# Patient Record
Sex: FEMALE | Race: WHITE | NOT HISPANIC OR LATINO | Employment: FULL TIME | URBAN - METROPOLITAN AREA
[De-identification: names, ages, dates, MRNs, and addresses within clinical notes are randomized per-mention and may not be internally consistent; named-entity substitution may affect disease eponyms.]

---

## 2017-08-10 ENCOUNTER — GENERIC CONVERSION - ENCOUNTER (OUTPATIENT)
Dept: FAMILY MEDICINE CLINIC | Facility: CLINIC | Age: 52
End: 2017-08-10

## 2017-08-10 ENCOUNTER — GENERIC CONVERSION - ENCOUNTER (OUTPATIENT)
Dept: OTHER | Facility: OTHER | Age: 52
End: 2017-08-10

## 2017-08-25 ENCOUNTER — LAB CONVERSION - ENCOUNTER (OUTPATIENT)
Dept: FAMILY MEDICINE CLINIC | Facility: CLINIC | Age: 52
End: 2017-08-25

## 2017-09-06 ENCOUNTER — APPOINTMENT (INPATIENT)
Dept: RADIOLOGY | Facility: HOSPITAL | Age: 52
DRG: 419 | End: 2017-09-06
Payer: COMMERCIAL

## 2017-09-06 ENCOUNTER — ANESTHESIA (INPATIENT)
Dept: PERIOP | Facility: HOSPITAL | Age: 52
DRG: 419 | End: 2017-09-06
Payer: COMMERCIAL

## 2017-09-06 ENCOUNTER — HOSPITAL ENCOUNTER (INPATIENT)
Facility: HOSPITAL | Age: 52
LOS: 2 days | Discharge: HOME | DRG: 419 | End: 2017-09-08
Attending: EMERGENCY MEDICINE | Admitting: SURGERY
Payer: COMMERCIAL

## 2017-09-06 ENCOUNTER — APPOINTMENT (EMERGENCY)
Dept: CT IMAGING | Facility: HOSPITAL | Age: 52
DRG: 419 | End: 2017-09-06
Payer: COMMERCIAL

## 2017-09-06 ENCOUNTER — APPOINTMENT (EMERGENCY)
Dept: ULTRASOUND IMAGING | Facility: HOSPITAL | Age: 52
DRG: 419 | End: 2017-09-06
Payer: COMMERCIAL

## 2017-09-06 ENCOUNTER — ANESTHESIA EVENT (INPATIENT)
Dept: PERIOP | Facility: HOSPITAL | Age: 52
DRG: 419 | End: 2017-09-06
Payer: COMMERCIAL

## 2017-09-06 DIAGNOSIS — K80.50 CHOLEDOCHOLITHIASIS: ICD-10-CM

## 2017-09-06 DIAGNOSIS — K81.9 CHOLECYSTITIS: ICD-10-CM

## 2017-09-06 DIAGNOSIS — K81.0 ACUTE CHOLECYSTITIS: Primary | ICD-10-CM

## 2017-09-06 LAB
ALBUMIN SERPL BCP-MCNC: 4 G/DL (ref 3.5–5)
ALP SERPL-CCNC: 83 U/L (ref 46–116)
ALT SERPL W P-5'-P-CCNC: 24 U/L (ref 12–78)
ANION GAP SERPL CALCULATED.3IONS-SCNC: 10 MMOL/L (ref 4–13)
AST SERPL W P-5'-P-CCNC: 18 U/L (ref 5–45)
BASOPHILS # BLD AUTO: 0.05 THOUSANDS/ΜL (ref 0–0.1)
BASOPHILS NFR BLD AUTO: 0 % (ref 0–1)
BILIRUB SERPL-MCNC: 0.3 MG/DL (ref 0.2–1)
BUN SERPL-MCNC: 11 MG/DL (ref 5–25)
CALCIUM SERPL-MCNC: 9.4 MG/DL (ref 8.3–10.1)
CHLORIDE SERPL-SCNC: 100 MMOL/L (ref 100–108)
CO2 SERPL-SCNC: 26 MMOL/L (ref 21–32)
CREAT SERPL-MCNC: 0.85 MG/DL (ref 0.6–1.3)
EOSINOPHIL # BLD AUTO: 0.05 THOUSAND/ΜL (ref 0–0.61)
EOSINOPHIL NFR BLD AUTO: 0 % (ref 0–6)
ERYTHROCYTE [DISTWIDTH] IN BLOOD BY AUTOMATED COUNT: 13.5 % (ref 11.6–15.1)
GFR SERPL CREATININE-BSD FRML MDRD: 78 ML/MIN/1.73SQ M
GLUCOSE SERPL-MCNC: 114 MG/DL (ref 65–140)
HCG UR QL: NORMAL
HCT VFR BLD AUTO: 40 % (ref 34.8–46.1)
HGB BLD-MCNC: 13.8 G/DL (ref 11.5–15.4)
LIPASE SERPL-CCNC: 105 U/L (ref 73–393)
LYMPHOCYTES # BLD AUTO: 1.61 THOUSANDS/ΜL (ref 0.6–4.47)
LYMPHOCYTES NFR BLD AUTO: 13 % (ref 14–44)
MCH RBC QN AUTO: 31.8 PG (ref 26.8–34.3)
MCHC RBC AUTO-ENTMCNC: 34.5 G/DL (ref 31.4–37.4)
MCV RBC AUTO: 92 FL (ref 82–98)
MONOCYTES # BLD AUTO: 0.68 THOUSAND/ΜL (ref 0.17–1.22)
MONOCYTES NFR BLD AUTO: 6 % (ref 4–12)
NEUTROPHILS # BLD AUTO: 9.82 THOUSANDS/ΜL (ref 1.85–7.62)
NEUTS SEG NFR BLD AUTO: 80 % (ref 43–75)
NRBC BLD AUTO-RTO: 0 /100 WBCS
PLATELET # BLD AUTO: 213 THOUSANDS/UL (ref 149–390)
PLATELET # BLD AUTO: 232 THOUSANDS/UL (ref 149–390)
PMV BLD AUTO: 10.4 FL (ref 8.9–12.7)
PMV BLD AUTO: 10.8 FL (ref 8.9–12.7)
POTASSIUM SERPL-SCNC: 4 MMOL/L (ref 3.5–5.3)
PROT SERPL-MCNC: 8.2 G/DL (ref 6.4–8.2)
RBC # BLD AUTO: 4.34 MILLION/UL (ref 3.81–5.12)
SODIUM SERPL-SCNC: 136 MMOL/L (ref 136–145)
WBC # BLD AUTO: 12.25 THOUSAND/UL (ref 4.31–10.16)

## 2017-09-06 PROCEDURE — 74300 X-RAY BILE DUCTS/PANCREAS: CPT

## 2017-09-06 PROCEDURE — C9113 INJ PANTOPRAZOLE SODIUM, VIA: HCPCS | Performed by: PHYSICIAN ASSISTANT

## 2017-09-06 PROCEDURE — 76705 ECHO EXAM OF ABDOMEN: CPT

## 2017-09-06 PROCEDURE — 96361 HYDRATE IV INFUSION ADD-ON: CPT

## 2017-09-06 PROCEDURE — 96376 TX/PRO/DX INJ SAME DRUG ADON: CPT

## 2017-09-06 PROCEDURE — 36415 COLL VENOUS BLD VENIPUNCTURE: CPT | Performed by: EMERGENCY MEDICINE

## 2017-09-06 PROCEDURE — 85025 COMPLETE CBC W/AUTO DIFF WBC: CPT | Performed by: EMERGENCY MEDICINE

## 2017-09-06 PROCEDURE — 80053 COMPREHEN METABOLIC PANEL: CPT | Performed by: EMERGENCY MEDICINE

## 2017-09-06 PROCEDURE — 96374 THER/PROPH/DIAG INJ IV PUSH: CPT

## 2017-09-06 PROCEDURE — 36415 COLL VENOUS BLD VENIPUNCTURE: CPT | Performed by: PHYSICIAN ASSISTANT

## 2017-09-06 PROCEDURE — 88304 TISSUE EXAM BY PATHOLOGIST: CPT | Performed by: SURGERY

## 2017-09-06 PROCEDURE — 99285 EMERGENCY DEPT VISIT HI MDM: CPT

## 2017-09-06 PROCEDURE — 81025 URINE PREGNANCY TEST: CPT | Performed by: EMERGENCY MEDICINE

## 2017-09-06 PROCEDURE — 85049 AUTOMATED PLATELET COUNT: CPT | Performed by: PHYSICIAN ASSISTANT

## 2017-09-06 PROCEDURE — BF101ZZ FLUOROSCOPY OF BILE DUCTS USING LOW OSMOLAR CONTRAST: ICD-10-PCS | Performed by: SURGERY

## 2017-09-06 PROCEDURE — 83690 ASSAY OF LIPASE: CPT | Performed by: EMERGENCY MEDICINE

## 2017-09-06 PROCEDURE — 96375 TX/PRO/DX INJ NEW DRUG ADDON: CPT

## 2017-09-06 PROCEDURE — 0FT44ZZ RESECTION OF GALLBLADDER, PERCUTANEOUS ENDOSCOPIC APPROACH: ICD-10-PCS | Performed by: SURGERY

## 2017-09-06 PROCEDURE — 74177 CT ABD & PELVIS W/CONTRAST: CPT

## 2017-09-06 RX ORDER — DEXTROSE, SODIUM CHLORIDE, AND POTASSIUM CHLORIDE 5; .45; .15 G/100ML; G/100ML; G/100ML
100 INJECTION INTRAVENOUS CONTINUOUS
Status: DISCONTINUED | OUTPATIENT
Start: 2017-09-06 | End: 2017-09-08

## 2017-09-06 RX ORDER — SUCCINYLCHOLINE CHLORIDE 20 MG/ML
INJECTION INTRAMUSCULAR; INTRAVENOUS AS NEEDED
Status: DISCONTINUED | OUTPATIENT
Start: 2017-09-06 | End: 2017-09-06 | Stop reason: SURG

## 2017-09-06 RX ORDER — ROCURONIUM BROMIDE 10 MG/ML
INJECTION, SOLUTION INTRAVENOUS AS NEEDED
Status: DISCONTINUED | OUTPATIENT
Start: 2017-09-06 | End: 2017-09-06 | Stop reason: SURG

## 2017-09-06 RX ORDER — GLYCOPYRROLATE 0.2 MG/ML
INJECTION INTRAMUSCULAR; INTRAVENOUS AS NEEDED
Status: DISCONTINUED | OUTPATIENT
Start: 2017-09-06 | End: 2017-09-06 | Stop reason: SURG

## 2017-09-06 RX ORDER — MAGNESIUM HYDROXIDE 1200 MG/15ML
LIQUID ORAL AS NEEDED
Status: DISCONTINUED | OUTPATIENT
Start: 2017-09-06 | End: 2017-09-06 | Stop reason: HOSPADM

## 2017-09-06 RX ORDER — PROMETHAZINE HYDROCHLORIDE 25 MG/ML
12.5 INJECTION, SOLUTION INTRAMUSCULAR; INTRAVENOUS ONCE
Status: DISCONTINUED | OUTPATIENT
Start: 2017-09-06 | End: 2017-09-06 | Stop reason: HOSPADM

## 2017-09-06 RX ORDER — PANTOPRAZOLE SODIUM 40 MG/1
40 INJECTION, POWDER, FOR SOLUTION INTRAVENOUS
Status: DISCONTINUED | OUTPATIENT
Start: 2017-09-06 | End: 2017-09-06

## 2017-09-06 RX ORDER — SODIUM CHLORIDE 9 MG/ML
125 INJECTION, SOLUTION INTRAVENOUS CONTINUOUS
Status: DISCONTINUED | OUTPATIENT
Start: 2017-09-06 | End: 2017-09-06

## 2017-09-06 RX ORDER — ONDANSETRON 2 MG/ML
4 INJECTION INTRAMUSCULAR; INTRAVENOUS ONCE
Status: COMPLETED | OUTPATIENT
Start: 2017-09-06 | End: 2017-09-06

## 2017-09-06 RX ORDER — BUPIVACAINE HYDROCHLORIDE 2.5 MG/ML
INJECTION, SOLUTION EPIDURAL; INFILTRATION; INTRACAUDAL AS NEEDED
Status: DISCONTINUED | OUTPATIENT
Start: 2017-09-06 | End: 2017-09-06 | Stop reason: HOSPADM

## 2017-09-06 RX ORDER — NICOTINE 21 MG/24HR
1 PATCH, TRANSDERMAL 24 HOURS TRANSDERMAL DAILY
Status: DISCONTINUED | OUTPATIENT
Start: 2017-09-06 | End: 2017-09-08 | Stop reason: HOSPADM

## 2017-09-06 RX ORDER — ONDANSETRON 2 MG/ML
INJECTION INTRAMUSCULAR; INTRAVENOUS AS NEEDED
Status: DISCONTINUED | OUTPATIENT
Start: 2017-09-06 | End: 2017-09-06 | Stop reason: SURG

## 2017-09-06 RX ORDER — PROPOFOL 10 MG/ML
INJECTION, EMULSION INTRAVENOUS AS NEEDED
Status: DISCONTINUED | OUTPATIENT
Start: 2017-09-06 | End: 2017-09-06 | Stop reason: SURG

## 2017-09-06 RX ORDER — LIDOCAINE HYDROCHLORIDE 10 MG/ML
INJECTION, SOLUTION INFILTRATION; PERINEURAL AS NEEDED
Status: DISCONTINUED | OUTPATIENT
Start: 2017-09-06 | End: 2017-09-06 | Stop reason: SURG

## 2017-09-06 RX ORDER — ONDANSETRON 2 MG/ML
4 INJECTION INTRAMUSCULAR; INTRAVENOUS EVERY 4 HOURS PRN
Status: DISCONTINUED | OUTPATIENT
Start: 2017-09-06 | End: 2017-09-08 | Stop reason: HOSPADM

## 2017-09-06 RX ORDER — MIDAZOLAM HYDROCHLORIDE 1 MG/ML
INJECTION INTRAMUSCULAR; INTRAVENOUS AS NEEDED
Status: DISCONTINUED | OUTPATIENT
Start: 2017-09-06 | End: 2017-09-06 | Stop reason: SURG

## 2017-09-06 RX ORDER — SODIUM CHLORIDE, SODIUM LACTATE, POTASSIUM CHLORIDE, CALCIUM CHLORIDE 600; 310; 30; 20 MG/100ML; MG/100ML; MG/100ML; MG/100ML
INJECTION, SOLUTION INTRAVENOUS CONTINUOUS PRN
Status: DISCONTINUED | OUTPATIENT
Start: 2017-09-06 | End: 2017-09-06 | Stop reason: SURG

## 2017-09-06 RX ORDER — FENTANYL CITRATE 50 UG/ML
INJECTION, SOLUTION INTRAMUSCULAR; INTRAVENOUS AS NEEDED
Status: DISCONTINUED | OUTPATIENT
Start: 2017-09-06 | End: 2017-09-06 | Stop reason: SURG

## 2017-09-06 RX ORDER — ACETAMINOPHEN 325 MG/1
650 TABLET ORAL EVERY 6 HOURS PRN
Status: DISCONTINUED | OUTPATIENT
Start: 2017-09-06 | End: 2017-09-08 | Stop reason: HOSPADM

## 2017-09-06 RX ORDER — FENTANYL CITRATE/PF 50 MCG/ML
25 SYRINGE (ML) INJECTION
Status: DISCONTINUED | OUTPATIENT
Start: 2017-09-06 | End: 2017-09-06 | Stop reason: HOSPADM

## 2017-09-06 RX ORDER — METOCLOPRAMIDE HYDROCHLORIDE 5 MG/ML
10 INJECTION INTRAMUSCULAR; INTRAVENOUS ONCE
Status: DISCONTINUED | OUTPATIENT
Start: 2017-09-06 | End: 2017-09-06 | Stop reason: HOSPADM

## 2017-09-06 RX ORDER — ALBUTEROL SULFATE 2.5 MG/3ML
2.5 SOLUTION RESPIRATORY (INHALATION) ONCE
Status: DISCONTINUED | OUTPATIENT
Start: 2017-09-06 | End: 2017-09-06 | Stop reason: HOSPADM

## 2017-09-06 RX ORDER — KETOROLAC TROMETHAMINE 30 MG/ML
INJECTION, SOLUTION INTRAMUSCULAR; INTRAVENOUS AS NEEDED
Status: DISCONTINUED | OUTPATIENT
Start: 2017-09-06 | End: 2017-09-06 | Stop reason: SURG

## 2017-09-06 RX ADMIN — FENTANYL CITRATE 25 MCG: 50 INJECTION INTRAMUSCULAR; INTRAVENOUS at 16:25

## 2017-09-06 RX ADMIN — DEXAMETHASONE SODIUM PHOSPHATE 10 MG: 10 INJECTION INTRAMUSCULAR; INTRAVENOUS at 15:16

## 2017-09-06 RX ADMIN — PANTOPRAZOLE SODIUM 40 MG: 40 INJECTION, POWDER, FOR SOLUTION INTRAVENOUS at 11:18

## 2017-09-06 RX ADMIN — HYDROMORPHONE HYDROCHLORIDE 1 MG: 1 INJECTION, SOLUTION INTRAMUSCULAR; INTRAVENOUS; SUBCUTANEOUS at 14:29

## 2017-09-06 RX ADMIN — NEOSTIGMINE METHYLSULFATE 4 MG: 1 INJECTION, SOLUTION INTRAMUSCULAR; INTRAVENOUS; SUBCUTANEOUS at 15:50

## 2017-09-06 RX ADMIN — GLYCOPYRROLATE 0.6 MG: 0.2 INJECTION, SOLUTION INTRAMUSCULAR; INTRAVENOUS at 15:50

## 2017-09-06 RX ADMIN — CEFAZOLIN SODIUM 2000 MG: 2 SOLUTION INTRAVENOUS at 18:58

## 2017-09-06 RX ADMIN — KETOROLAC TROMETHAMINE 30 MG: 30 INJECTION, SOLUTION INTRAMUSCULAR at 15:16

## 2017-09-06 RX ADMIN — LIDOCAINE HYDROCHLORIDE 50 MG: 10 INJECTION, SOLUTION INFILTRATION; PERINEURAL at 15:16

## 2017-09-06 RX ADMIN — SUCCINYLCHOLINE CHLORIDE 100 MG: 20 INJECTION, SOLUTION INTRAMUSCULAR; INTRAVENOUS at 15:16

## 2017-09-06 RX ADMIN — ONDANSETRON 4 MG: 2 INJECTION INTRAMUSCULAR; INTRAVENOUS at 05:43

## 2017-09-06 RX ADMIN — DEXTROSE, SODIUM CHLORIDE, AND POTASSIUM CHLORIDE 100 ML/HR: 5; .45; .15 INJECTION INTRAVENOUS at 18:27

## 2017-09-06 RX ADMIN — ROCURONIUM BROMIDE 15 MG: 10 INJECTION, SOLUTION INTRAVENOUS at 15:25

## 2017-09-06 RX ADMIN — SODIUM CHLORIDE, SODIUM LACTATE, POTASSIUM CHLORIDE, AND CALCIUM CHLORIDE: .6; .31; .03; .02 INJECTION, SOLUTION INTRAVENOUS at 15:10

## 2017-09-06 RX ADMIN — NICOTINE 1 PATCH: 14 PATCH TRANSDERMAL at 11:22

## 2017-09-06 RX ADMIN — HYDROMORPHONE HYDROCHLORIDE 1 MG: 1 INJECTION, SOLUTION INTRAMUSCULAR; INTRAVENOUS; SUBCUTANEOUS at 21:51

## 2017-09-06 RX ADMIN — ROCURONIUM BROMIDE 5 MG: 10 INJECTION, SOLUTION INTRAVENOUS at 15:16

## 2017-09-06 RX ADMIN — ONDANSETRON 4 MG: 2 INJECTION INTRAMUSCULAR; INTRAVENOUS at 14:34

## 2017-09-06 RX ADMIN — FENTANYL CITRATE 50 MCG: 50 INJECTION, SOLUTION INTRAMUSCULAR; INTRAVENOUS at 15:40

## 2017-09-06 RX ADMIN — ONDANSETRON 4 MG: 2 INJECTION INTRAMUSCULAR; INTRAVENOUS at 10:12

## 2017-09-06 RX ADMIN — HYDROMORPHONE HYDROCHLORIDE 1 MG: 1 INJECTION, SOLUTION INTRAMUSCULAR; INTRAVENOUS; SUBCUTANEOUS at 11:27

## 2017-09-06 RX ADMIN — SODIUM CHLORIDE 125 ML/HR: 0.9 INJECTION, SOLUTION INTRAVENOUS at 16:10

## 2017-09-06 RX ADMIN — GLYCOPYRROLATE 0.2 MG: 0.2 INJECTION, SOLUTION INTRAMUSCULAR; INTRAVENOUS at 15:10

## 2017-09-06 RX ADMIN — PROPOFOL 200 MG: 10 INJECTION, EMULSION INTRAVENOUS at 15:16

## 2017-09-06 RX ADMIN — ONDANSETRON 4 MG: 2 INJECTION INTRAMUSCULAR; INTRAVENOUS at 15:16

## 2017-09-06 RX ADMIN — IOHEXOL 100 ML: 350 INJECTION, SOLUTION INTRAVENOUS at 06:31

## 2017-09-06 RX ADMIN — MIDAZOLAM HYDROCHLORIDE 2 MG: 1 INJECTION, SOLUTION INTRAMUSCULAR; INTRAVENOUS at 15:10

## 2017-09-06 RX ADMIN — CEFAZOLIN SODIUM 2000 MG: 2 SOLUTION INTRAVENOUS at 15:14

## 2017-09-06 RX ADMIN — ONDANSETRON 4 MG: 2 INJECTION INTRAMUSCULAR; INTRAVENOUS at 16:10

## 2017-09-06 RX ADMIN — SODIUM CHLORIDE 125 ML/HR: 0.9 INJECTION, SOLUTION INTRAVENOUS at 07:51

## 2017-09-06 RX ADMIN — HYDROMORPHONE HYDROCHLORIDE 1 MG: 1 INJECTION, SOLUTION INTRAMUSCULAR; INTRAVENOUS; SUBCUTANEOUS at 05:42

## 2017-09-07 ENCOUNTER — APPOINTMENT (INPATIENT)
Dept: RADIOLOGY | Facility: HOSPITAL | Age: 52
DRG: 419 | End: 2017-09-07
Payer: COMMERCIAL

## 2017-09-07 ENCOUNTER — ANESTHESIA (INPATIENT)
Dept: PERIOP | Facility: HOSPITAL | Age: 52
DRG: 419 | End: 2017-09-07
Payer: COMMERCIAL

## 2017-09-07 ENCOUNTER — ANESTHESIA EVENT (INPATIENT)
Dept: PERIOP | Facility: HOSPITAL | Age: 52
DRG: 419 | End: 2017-09-07
Payer: COMMERCIAL

## 2017-09-07 ENCOUNTER — APPOINTMENT (INPATIENT)
Dept: MRI IMAGING | Facility: HOSPITAL | Age: 52
DRG: 419 | End: 2017-09-07
Payer: COMMERCIAL

## 2017-09-07 PROBLEM — K80.50 CHOLEDOCHOLITHIASIS: Status: ACTIVE | Noted: 2017-09-07

## 2017-09-07 LAB
ANION GAP SERPL CALCULATED.3IONS-SCNC: 5 MMOL/L (ref 4–13)
BUN SERPL-MCNC: 8 MG/DL (ref 5–25)
CALCIUM SERPL-MCNC: 8.9 MG/DL (ref 8.3–10.1)
CHLORIDE SERPL-SCNC: 105 MMOL/L (ref 100–108)
CO2 SERPL-SCNC: 28 MMOL/L (ref 21–32)
CREAT SERPL-MCNC: 0.82 MG/DL (ref 0.6–1.3)
ERYTHROCYTE [DISTWIDTH] IN BLOOD BY AUTOMATED COUNT: 13.9 % (ref 11.6–15.1)
GFR SERPL CREATININE-BSD FRML MDRD: 82 ML/MIN/1.73SQ M
GLUCOSE SERPL-MCNC: 141 MG/DL (ref 65–140)
HCT VFR BLD AUTO: 36.6 % (ref 34.8–46.1)
HGB BLD-MCNC: 12.1 G/DL (ref 11.5–15.4)
MCH RBC QN AUTO: 31.2 PG (ref 26.8–34.3)
MCHC RBC AUTO-ENTMCNC: 33.1 G/DL (ref 31.4–37.4)
MCV RBC AUTO: 94 FL (ref 82–98)
PLATELET # BLD AUTO: 176 THOUSANDS/UL (ref 149–390)
PMV BLD AUTO: 11.6 FL (ref 8.9–12.7)
POTASSIUM SERPL-SCNC: 4.7 MMOL/L (ref 3.5–5.3)
RBC # BLD AUTO: 3.88 MILLION/UL (ref 3.81–5.12)
SODIUM SERPL-SCNC: 138 MMOL/L (ref 136–145)
WBC # BLD AUTO: 9.64 THOUSAND/UL (ref 4.31–10.16)

## 2017-09-07 PROCEDURE — C1769 GUIDE WIRE: HCPCS | Performed by: INTERNAL MEDICINE

## 2017-09-07 PROCEDURE — C1726 CATH, BAL DIL, NON-VASCULAR: HCPCS | Performed by: INTERNAL MEDICINE

## 2017-09-07 PROCEDURE — 76377 3D RENDER W/INTRP POSTPROCES: CPT

## 2017-09-07 PROCEDURE — 0FC98ZZ EXTIRPATION OF MATTER FROM COMMON BILE DUCT, VIA NATURAL OR ARTIFICIAL OPENING ENDOSCOPIC: ICD-10-PCS | Performed by: INTERNAL MEDICINE

## 2017-09-07 PROCEDURE — 74328 X-RAY BILE DUCT ENDOSCOPY: CPT

## 2017-09-07 PROCEDURE — 80048 BASIC METABOLIC PNL TOTAL CA: CPT | Performed by: PHYSICIAN ASSISTANT

## 2017-09-07 PROCEDURE — 85027 COMPLETE CBC AUTOMATED: CPT | Performed by: PHYSICIAN ASSISTANT

## 2017-09-07 PROCEDURE — 74181 MRI ABDOMEN W/O CONTRAST: CPT

## 2017-09-07 RX ORDER — SODIUM CHLORIDE, SODIUM LACTATE, POTASSIUM CHLORIDE, CALCIUM CHLORIDE 600; 310; 30; 20 MG/100ML; MG/100ML; MG/100ML; MG/100ML
INJECTION, SOLUTION INTRAVENOUS CONTINUOUS PRN
Status: DISCONTINUED | OUTPATIENT
Start: 2017-09-07 | End: 2017-09-07 | Stop reason: SURG

## 2017-09-07 RX ORDER — PROPOFOL 10 MG/ML
INJECTION, EMULSION INTRAVENOUS AS NEEDED
Status: DISCONTINUED | OUTPATIENT
Start: 2017-09-07 | End: 2017-09-07 | Stop reason: SURG

## 2017-09-07 RX ORDER — SUCCINYLCHOLINE/SOD CL,ISO/PF 100 MG/5ML
SYRINGE (ML) INTRAVENOUS AS NEEDED
Status: DISCONTINUED | OUTPATIENT
Start: 2017-09-07 | End: 2017-09-07 | Stop reason: SURG

## 2017-09-07 RX ORDER — SIMETHICONE 80 MG
80 TABLET,CHEWABLE ORAL EVERY 6 HOURS PRN
Status: DISCONTINUED | OUTPATIENT
Start: 2017-09-07 | End: 2017-09-08 | Stop reason: HOSPADM

## 2017-09-07 RX ORDER — ONDANSETRON 2 MG/ML
INJECTION INTRAMUSCULAR; INTRAVENOUS AS NEEDED
Status: DISCONTINUED | OUTPATIENT
Start: 2017-09-07 | End: 2017-09-07 | Stop reason: SURG

## 2017-09-07 RX ADMIN — DEXTROSE, SODIUM CHLORIDE, AND POTASSIUM CHLORIDE 100 ML/HR: 5; .45; .15 INJECTION INTRAVENOUS at 19:57

## 2017-09-07 RX ADMIN — HYDROMORPHONE HYDROCHLORIDE 1 MG: 1 INJECTION, SOLUTION INTRAMUSCULAR; INTRAVENOUS; SUBCUTANEOUS at 03:53

## 2017-09-07 RX ADMIN — LIDOCAINE HYDROCHLORIDE 100 MG: 20 INJECTION, SOLUTION INTRAVENOUS at 13:03

## 2017-09-07 RX ADMIN — DEXTROSE, SODIUM CHLORIDE, AND POTASSIUM CHLORIDE 100 ML/HR: 5; .45; .15 INJECTION INTRAVENOUS at 13:45

## 2017-09-07 RX ADMIN — HYDROMORPHONE HYDROCHLORIDE 1 MG: 1 INJECTION, SOLUTION INTRAMUSCULAR; INTRAVENOUS; SUBCUTANEOUS at 22:27

## 2017-09-07 RX ADMIN — ONDANSETRON 4 MG: 2 INJECTION INTRAMUSCULAR; INTRAVENOUS at 13:03

## 2017-09-07 RX ADMIN — FAMOTIDINE 20 MG: 10 INJECTION, SOLUTION INTRAVENOUS at 09:54

## 2017-09-07 RX ADMIN — CEFAZOLIN SODIUM 2000 MG: 2 SOLUTION INTRAVENOUS at 11:40

## 2017-09-07 RX ADMIN — PROPOFOL 200 MG: 10 INJECTION, EMULSION INTRAVENOUS at 13:03

## 2017-09-07 RX ADMIN — Medication 100 MG: at 13:03

## 2017-09-07 RX ADMIN — SODIUM CHLORIDE, SODIUM LACTATE, POTASSIUM CHLORIDE, AND CALCIUM CHLORIDE: .6; .31; .03; .02 INJECTION, SOLUTION INTRAVENOUS at 12:55

## 2017-09-07 RX ADMIN — FAMOTIDINE 20 MG: 10 INJECTION, SOLUTION INTRAVENOUS at 22:24

## 2017-09-07 RX ADMIN — DEXTROSE, SODIUM CHLORIDE, AND POTASSIUM CHLORIDE 100 ML/HR: 5; .45; .15 INJECTION INTRAVENOUS at 06:04

## 2017-09-07 RX ADMIN — HYDROMORPHONE HYDROCHLORIDE 1 MG: 1 INJECTION, SOLUTION INTRAMUSCULAR; INTRAVENOUS; SUBCUTANEOUS at 14:05

## 2017-09-07 RX ADMIN — SIMETHICONE CHEW TAB 80 MG 80 MG: 80 TABLET ORAL at 17:37

## 2017-09-07 RX ADMIN — ENOXAPARIN SODIUM 40 MG: 40 INJECTION SUBCUTANEOUS at 09:54

## 2017-09-07 RX ADMIN — ACETAMINOPHEN 650 MG: 325 TABLET ORAL at 19:54

## 2017-09-07 RX ADMIN — CEFAZOLIN SODIUM 2000 MG: 2 SOLUTION INTRAVENOUS at 02:39

## 2017-09-08 VITALS
BODY MASS INDEX: 24.59 KG/M2 | HEART RATE: 88 BPM | SYSTOLIC BLOOD PRESSURE: 128 MMHG | HEIGHT: 64 IN | DIASTOLIC BLOOD PRESSURE: 76 MMHG | RESPIRATION RATE: 18 BRPM | OXYGEN SATURATION: 98 % | WEIGHT: 144 LBS | TEMPERATURE: 98.8 F

## 2017-09-08 RX ORDER — NICOTINE 21 MG/24HR
1 PATCH, TRANSDERMAL 24 HOURS TRANSDERMAL DAILY
Qty: 28 PATCH | Refills: 0 | Status: SHIPPED | OUTPATIENT
Start: 2017-09-08 | End: 2018-12-14

## 2017-09-08 RX ORDER — OXYCODONE HYDROCHLORIDE AND ACETAMINOPHEN 5; 325 MG/1; MG/1
1 TABLET ORAL EVERY 6 HOURS PRN
Status: DISCONTINUED | OUTPATIENT
Start: 2017-09-08 | End: 2017-09-08 | Stop reason: HOSPADM

## 2017-09-08 RX ORDER — OXYCODONE HYDROCHLORIDE AND ACETAMINOPHEN 5; 325 MG/1; MG/1
1 TABLET ORAL EVERY 6 HOURS PRN
Qty: 15 TABLET | Refills: 0 | Status: SHIPPED | OUTPATIENT
Start: 2017-09-08 | End: 2017-09-11

## 2017-09-08 RX ADMIN — ENOXAPARIN SODIUM 40 MG: 40 INJECTION SUBCUTANEOUS at 08:16

## 2017-09-08 RX ADMIN — DEXTROSE, SODIUM CHLORIDE, AND POTASSIUM CHLORIDE 100 ML/HR: 5; .45; .15 INJECTION INTRAVENOUS at 06:03

## 2017-09-08 RX ADMIN — ACETAMINOPHEN 650 MG: 325 TABLET ORAL at 08:26

## 2017-09-08 RX ADMIN — NICOTINE 1 PATCH: 14 PATCH TRANSDERMAL at 08:16

## 2017-09-08 RX ADMIN — FAMOTIDINE 20 MG: 10 INJECTION, SOLUTION INTRAVENOUS at 08:17

## 2017-09-19 ENCOUNTER — ALLSCRIPTS OFFICE VISIT (OUTPATIENT)
Dept: OTHER | Facility: OTHER | Age: 52
End: 2017-09-19

## 2017-10-26 NOTE — PROGRESS NOTES
Assessment  1  Postoperative state (V45 89) (Z98 890)    Plan  Postoperative state    · Cephalexin 500 MG Oral Capsule; TAKE 1 CAPSULE 3 TIMES DAILY UNTIL GONE   Rx By: Cristian Murray; Dispense: 5 Days ; #:15 Capsule; Refill: 0;For: Postoperative state; GORGE = N; Sent To: [facility]   · Follow-up PRN Evaluation and Treatment  Follow-up  Status: Complete  Done:  91TSZ5361 03:13PM   Ordered; For: Postoperative state; Ordered By: Cristian Murray Performed:  Due: 80JCZ8214    Discussion/Summary    The patient did well after laparoscopic cholecystectomy and ERCP or acute cholecystitis and common bile duct stones  She is discharged from my care and she is allowed to go back to work without restrictions  I will put her on Keflex for 5 days mild skin infection  Chief Complaint  Post op Lap Olga      Post-Op  HPI: I had the pleasure of seeing Jaime Osborn in the office today for first postop follow-up after laparoscopic cholecystectomy for acute cholecystitis  She offers no complaints at this time  Active Problems  1  Sinusitis (473 9) (J32 9)    Social History   · Current every day smoker (305 1) (F17 200)    Current Meds   1  Fluticasone Propionate 50 MCG/ACT Nasal Suspension; USE 1 TO 2 SPRAYS IN EACH   NOSTRIL ONCE DAILY; Therapy: 76XCG3995 to (Last Rx:83Siq8069)  Requested for: 43Ecj5890 Ordered    Allergies  1  Ciprofloxacin HCl TABS   2  Erythromycin TABS   3  Levaquin TABS   4  Penicillins    Vitals   Recorded: 35Ioq0560 02:59PM   Temperature 96 8 F, Oral   Systolic 995, LUE, Sitting   Diastolic 76, LUE, Sitting   Height 5 ft 2 5 in   Weight 136 lb    BMI Calculated 24 48   BSA Calculated 1 63     Physical Exam    Abdomen The abdomen is soft, nondistended and nontender  There is a small gap on the lateral port incision as of infection, mild erythema without drainage        Signatures   Electronically signed by : Roya Bragg MD; Sep 19 2017  3:15PM EST                       (Author)

## 2017-12-26 ENCOUNTER — ALLSCRIPTS OFFICE VISIT (OUTPATIENT)
Dept: OTHER | Facility: OTHER | Age: 52
End: 2017-12-26

## 2017-12-26 DIAGNOSIS — M54.6 PAIN IN THORACIC SPINE: ICD-10-CM

## 2017-12-26 DIAGNOSIS — Z76.89 PERSONS ENCOUNTERING HEALTH SERVICES IN OTHER SPECIFIED CIRCUMSTANCES: ICD-10-CM

## 2017-12-26 DIAGNOSIS — Z13.220 ENCOUNTER FOR SCREENING FOR LIPOID DISORDERS: ICD-10-CM

## 2017-12-26 DIAGNOSIS — Z00.00 ENCOUNTER FOR GENERAL ADULT MEDICAL EXAMINATION WITHOUT ABNORMAL FINDINGS: ICD-10-CM

## 2017-12-28 NOTE — PROGRESS NOTES
Assessment   1  Encounter to establish care (V65 8) (Z76 89)   2  Lipid screening (V77 91) (Z13 220)   3  Cigarette smoker (305 1) (F17 210)   4  Acute bilateral thoracic back pain (724 1) (M54 6)   5  Gluten intolerance (579 0) (K90 0)    Plan   Acute bilateral thoracic back pain    · Cyclobenzaprine HCl - 10 MG Oral Tablet; TAKE 1 TABLET 3 TIMES DAILY AS    NEEDED  Acute bilateral thoracic back pain, Encounter to establish care, Lipid screening    · (1) LIPID PANEL, FASTING; Status:Active; Requested for:68Sgs9959;   Gluten intolerance    · (LC) Gluten Sensitivity Screen; Status:Active; Requested for:62Xmy4102; Health Maintenance, Lipid screening    · (1) COMPREHENSIVE METABOLIC PANEL; Status:Active; Requested for:89Mli9676;   PMH: History of influenza vaccination    · Stop: Fluzone Quadrivalent 0 5 ML Intramuscular Suspension Prefilled    Syringe  SocHx: Cigarette smoker    · Nicotine 21 MG/24HR Transdermal Patch 24 Hour; APPLY 1 PATCH Daily    Discussion/Summary   Discussion Summary:    Nicotine addiction- willing to resume nicotine patches  pain- start cyclobenzaprine gluten intolerance- check labs  Advised to refrain from gluten prior to labwork visit- encourage daily exercise and strive for 5 servings of fruits and veggies daily  Counseling Documentation With Imm: The patient was counseled regarding  Medication SE Review and Pt Understands Tx: Possible side effects of new medications were reviewed with the patient/guardian today  The treatment plan was reviewed with the patient/guardian  The patient/guardian understands and agrees with the treatment plan    Self Referrals:    Self Referrals: No      Chief Complaint   Chief Complaint Free Text Note Form: Patient is here today to become established and is complaining of upper back discomfort      History of Present Illness   HPI: Here to establish care  sleeping well  Up most of night with  who is recovering from complicated surgery   addiction- did well on the patch,but then insurance stopped paying for the patch   pain- pain is located in between shoulder blades and down the center of the back  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  Symptoms flaired when she ran out of flexeril ad gave her 100+ pound alves retriever a bath  previous gluten sensitivity  HAd gall bladder out thinks this may have resolved  Review of Systems   Complete-Female:      Constitutional: No fever, no chills, feels well, no tiredness, no recent weight gain or weight loss  Eyes: No complaints of eye pain, no red eyes, no eyesight problems, no discharge, no dry eyes, no itching of eyes  Cardiovascular: No complaints of slow heart rate, no fast heart rate, no chest pain, no palpitations, no leg claudication, no lower extremity edema  Respiratory: No complaints of shortness of breath, no wheezing, no cough, no SOB on exertion, no orthopnea, no PND  Gastrointestinal: No complaints of abdominal pain, no constipation, no nausea or vomiting, no diarrhea, no bloody stools  Musculoskeletal: back pain, but-- as noted in HPI  Neurological: No complaints of headache, no confusion, no convulsions, no numbness, no dizziness or fainting, no tingling, no limb weakness, no difficulty walking  Psychiatric: anxiety-- and-- sleep disturbances  Hematologic/Lymphatic: No complaints of swollen glands, no swollen glands in the neck, does not bleed easily, does not bruise easily  Past Medical History   Active Problems And Past Medical History Reviewed: The active problems and past medical history were reviewed and updated today  Surgical History   1  History of Dental Surgery  Surgical History Reviewed: The surgical history was reviewed and updated today  Family History   Mother    1  No pertinent family history  Father    2  Family history of Diabetes mellitus of other type with microalbuminuria, with long-term     current use of insulin   3  Family history of diabetes mellitus (V18 0) (Z83 3)   4  Family history of essential hypertension (V17 49) (Z82 49)  Family History Reviewed: The family history was reviewed and updated today  Social History    · Cigarette smoker (305 1) (F17 210)  Social History Reviewed: The social history was reviewed and updated today  The social history was reviewed and is unchanged  Current Meds    1  No Reported Medications Recorded    Allergies   1  Ciprofloxacin HCl TABS   2  Erythromycin TABS   3  Levaquin TABS   4  Penicillins    Vitals   Vital Signs    Recorded: 32YWD4129 05:48PM   Temperature 98 5 F   Heart Rate 097   Systolic 996   Diastolic 82   Height 5 ft 2 5 in   Weight 133 lb 4 00 oz   BMI Calculated 23 98   BSA Calculated 1 62   O2 Saturation 98     Physical Exam        Constitutional      General appearance: No acute distress, well appearing and well nourished  Eyes      Conjunctiva and lids: No swelling, erythema or discharge  Pupils and irises: Equal, round and reactive to light  Ears, Nose, Mouth, and Throat      External inspection of ears and nose: Normal        Otoscopic examination: Tympanic membranes translucent with normal light reflex  Canals patent without erythema  -- dry skin in ears  Nasal mucosa, septum, and turbinates: Normal without edema or erythema  Oropharynx: Normal with no erythema, edema, exudate or lesions  Pulmonary      Respiratory effort: No increased work of breathing or signs of respiratory distress  Auscultation of lungs: Clear to auscultation  Cardiovascular      Auscultation of heart: Normal rate and rhythm, normal S1 and S2, without murmurs  Examination of extremities for edema and/or varicosities: Normal        Carotid pulses: Normal        Abdomen      Abdomen: Non-tender, no masses  Lymphatic      Palpation of lymph nodes in neck: No lymphadenopathy         Musculoskeletal      Gait and station: Normal  -- tenderness to palpation of upper back  Skin      Skin and subcutaneous tissue: Normal without rashes or lesions  Neurologic      Sensation: No sensory loss         Psychiatric      Orientation to person, place, and time: Normal        Mood and affect: Normal           Signatures    Electronically signed by : Ligia Martin NP; Dec 26 2017  6:37PM EST                       (Author)     Electronically signed by : Ghada Gupta MD; Dec 27 2017  8:28AM EST                       (Co-author)

## 2018-01-14 VITALS
BODY MASS INDEX: 24.1 KG/M2 | TEMPERATURE: 98.3 F | SYSTOLIC BLOOD PRESSURE: 112 MMHG | DIASTOLIC BLOOD PRESSURE: 76 MMHG | WEIGHT: 136 LBS | HEIGHT: 63 IN

## 2018-01-14 NOTE — MISCELLANEOUS
Message  Return to work or school:        Patient is able to return to work without limitations effective 9/20/2017  Charanjit Rowe        Signatures   Electronically signed by : Charanjit Rowe, ; Sep 19 2017  3:15PM EST                       (Author)

## 2018-01-23 VITALS
DIASTOLIC BLOOD PRESSURE: 82 MMHG | BODY MASS INDEX: 23.61 KG/M2 | SYSTOLIC BLOOD PRESSURE: 134 MMHG | HEIGHT: 63 IN | TEMPERATURE: 98.5 F | HEART RATE: 104 BPM | OXYGEN SATURATION: 98 % | WEIGHT: 133.25 LBS

## 2018-03-03 ENCOUNTER — OFFICE VISIT (OUTPATIENT)
Dept: URGENT CARE | Facility: CLINIC | Age: 53
End: 2018-03-03
Payer: COMMERCIAL

## 2018-03-03 VITALS
SYSTOLIC BLOOD PRESSURE: 147 MMHG | WEIGHT: 137.6 LBS | HEART RATE: 89 BPM | TEMPERATURE: 97.9 F | OXYGEN SATURATION: 100 % | RESPIRATION RATE: 16 BRPM | DIASTOLIC BLOOD PRESSURE: 66 MMHG | HEIGHT: 64 IN | BODY MASS INDEX: 23.49 KG/M2

## 2018-03-03 DIAGNOSIS — H61.21 IMPACTED CERUMEN OF RIGHT EAR: ICD-10-CM

## 2018-03-03 DIAGNOSIS — J06.9 UPPER RESPIRATORY TRACT INFECTION, UNSPECIFIED TYPE: Primary | ICD-10-CM

## 2018-03-03 PROCEDURE — 99213 OFFICE O/P EST LOW 20 MIN: CPT | Performed by: PHYSICIAN ASSISTANT

## 2018-03-03 RX ORDER — DOXYCYCLINE 100 MG/1
100 CAPSULE ORAL 2 TIMES DAILY
Qty: 14 CAPSULE | Refills: 0 | Status: SHIPPED | OUTPATIENT
Start: 2018-03-03 | End: 2018-03-03

## 2018-03-03 RX ORDER — CEFDINIR 300 MG/1
300 CAPSULE ORAL EVERY 12 HOURS SCHEDULED
Qty: 14 CAPSULE | Refills: 0 | Status: SHIPPED | OUTPATIENT
Start: 2018-03-03 | End: 2019-08-26 | Stop reason: SDUPTHER

## 2018-03-03 RX ORDER — BROMPHENIRAMINE MALEATE, PSEUDOEPHEDRINE HYDROCHLORIDE, AND DEXTROMETHORPHAN HYDROBROMIDE 2; 30; 10 MG/5ML; MG/5ML; MG/5ML
5 SYRUP ORAL 4 TIMES DAILY PRN
Qty: 118 ML | Refills: 0 | Status: SHIPPED | OUTPATIENT
Start: 2018-03-03 | End: 2018-09-07 | Stop reason: ALTCHOICE

## 2018-03-03 NOTE — PATIENT INSTRUCTIONS
Debrox otc for ear wax  Follow up with audiology  No q tips in ear  Follow up with PCP in 3-5 days  Proceed to  ER if symptoms worsen

## 2018-03-03 NOTE — PROGRESS NOTES
[facility]        NAME: Juarez Joyce is a 47 y o  female  : 1965    MRN: [id number]  DATE: March 3, 2018  TIME: 3:31 PM    Assessment and Plan   Upper respiratory tract infection, unspecified type [J06 9]  1  Upper respiratory tract infection, unspecified type  brompheniramine-pseudoephedrine-DM 30-2-10 MG/5ML syrup    doxycycline monohydrate (MONODOX) 100 mg capsule   2  Impacted cerumen of right ear           Patient Instructions     Debrox otc for ear wax  Follow up with audiology  No q tips in ear  Follow up with PCP in 3-5 days  Proceed to  ER if symptoms worsen  Chief Complaint     Chief Complaint   Patient presents with    Earache     Bilateral, R worse than L  Started 1 week ago    Cough    Cold Like Symptoms         History of Present Illness        54-year-old female complains of right ear feeling clogged and decreased hearing with a hearing aid for 1 week  She also complains of she has some congestion sinus pressure and stuffy nose for 1 week  No fever  She uses Q-Tips and 80 on her ears  Left ear feels itchy  No ear drainage  She is unable to wear her hearing aids due to the pain right now  No chest pain or shortness of breath          Review of Systems   Review of Systems      Current Medications       Current Outpatient Prescriptions:     brompheniramine-pseudoephedrine-DM 30-2-10 MG/5ML syrup, Take 5 mL by mouth 4 (four) times a day as needed for allergies, Disp: 118 mL, Rfl: 0    doxycycline monohydrate (MONODOX) 100 mg capsule, Take 1 capsule (100 mg total) by mouth 2 (two) times a day for 7 days, Disp: 14 capsule, Rfl: 0    nicotine (NICODERM CQ) 14 mg/24hr TD 24 hr patch, Place 1 patch on the skin daily, Disp: 28 patch, Rfl: 0    Current Allergies     Allergies as of 2018 - Reviewed 2018   Allergen Reaction Noted    Bactrim [sulfamethoxazole-trimethoprim] GI Intolerance 2017    Ciprofloxacin GI Intolerance 2017    Erythromycin GI Intolerance 2017    Levaquin [levofloxacin] GI Intolerance 2017    Penicillins  2014    Valtrex [valacyclovir] Other (See Comments) 2017    Keflex [cephalexin] Swelling 2018            The following portions of the patient's history were reviewed and updated as appropriate: allergies, current medications, past family history, past medical history, past social history, past surgical history and problem list      Past Medical History:   Diagnosis Date    Choledocholithiasis 2017    GERD (gastroesophageal reflux disease)        Past Surgical History:   Procedure Laterality Date     SECTION      CHOLECYSTECTOMY      CHOLECYSTECTOMY LAPAROSCOPIC N/A 2017    Procedure: CHOLECYSTECTOMY LAPAROSCOPIC, with IOC, possible open;  Surgeon: Breanne Horton MD;  Location: [facility];  Service: General    ERCP N/A 2017    Procedure: ENDOSCOPIC RETROGRADE CHOLANGIOPANCREATOGRAPHY (ERCP); Surgeon: Christie Ventura MD;  Location: [facility];  Service: Gastroenterology       Family History   Problem Relation Age of Onset    Hodgkin's lymphoma Mother     Cancer Father     Hypertension Father          Medications have been verified  Objective   /66   Pulse 89   Temp 97 9 °F (36 6 °C) (Oral)   Resp 16   Ht 5' 4" (1 626 m)   Wt 62 4 kg (137 lb 9 6 oz)   SpO2 100%   BMI 23 62 kg/m²        Physical Exam     Physical Exam   Constitutional: She appears well-developed and well-nourished  No distress  HENT:   Right Ear: Tympanic membrane and ear canal normal  There is tenderness  No drainage or swelling  Decreased hearing is noted  Left Ear: Tympanic membrane, external ear and ear canal normal  No drainage, swelling or tenderness  Decreased hearing is noted  Nose: Mucosal edema and rhinorrhea present  Right sinus exhibits maxillary sinus tenderness  Right sinus exhibits no frontal sinus tenderness  Left sinus exhibits maxillary sinus tenderness   Left sinus exhibits no frontal sinus tenderness  Mouth/Throat: Oropharynx is clear and moist  No posterior oropharyngeal erythema  Left ear canal patent  Right ear canal occluded with cerumen  She is unable to completely cleared with irrigation due to dizziness  Eyes: Conjunctivae and EOM are normal  Pupils are equal, round, and reactive to light  No scleral icterus  Neck: Normal range of motion  Neck supple  Cardiovascular: Normal rate, regular rhythm and normal heart sounds  Pulmonary/Chest: Effort normal and breath sounds normal  No respiratory distress  She has no wheezes  She has no rales  Abdominal: Soft  Bowel sounds are normal  She exhibits no distension and no mass  There is no tenderness  There is no rebound and no guarding  Lymphadenopathy:     She has no cervical adenopathy  Skin: Skin is warm and dry  No rash noted       Ear cerumen removal  Date/Time: 3/3/2018 3:40 PM  Performed by: Tal Li by: Kika Overton     Patient location:  Clinic  Other Assisting Provider: No    Consent:     Consent obtained:  Verbal    Consent given by:  Patient    Risks discussed:  Infection, dizziness, incomplete removal, TM perforation and pain  Universal protocol:     Patient identity confirmed:  Verbally with patient  Procedure details:     Location:  R ear    Procedure type: irrigation    Post-procedure details:     Patient tolerance of procedure:  Procedure terminated at patient's request

## 2018-03-08 ENCOUNTER — OFFICE VISIT (OUTPATIENT)
Dept: FAMILY MEDICINE CLINIC | Facility: CLINIC | Age: 53
End: 2018-03-08
Payer: COMMERCIAL

## 2018-03-08 VITALS
BODY MASS INDEX: 23.56 KG/M2 | OXYGEN SATURATION: 95 % | HEART RATE: 100 BPM | WEIGHT: 138 LBS | DIASTOLIC BLOOD PRESSURE: 78 MMHG | HEIGHT: 64 IN | SYSTOLIC BLOOD PRESSURE: 126 MMHG

## 2018-03-08 DIAGNOSIS — H61.21 IMPACTED CERUMEN OF RIGHT EAR: Primary | ICD-10-CM

## 2018-03-08 PROCEDURE — 69209 REMOVE IMPACTED EAR WAX UNI: CPT | Performed by: NURSE PRACTITIONER

## 2018-03-08 PROCEDURE — 99214 OFFICE O/P EST MOD 30 MIN: CPT | Performed by: NURSE PRACTITIONER

## 2018-03-08 NOTE — PROGRESS NOTES
Assessment/Plan:    No problem-specific Assessment & Plan notes found for this encounter  Diagnoses and all orders for this visit:    Impacted cerumen of right ear          Subjective:      Patient ID: Whitney Watson is a 47 y o  female  Pt was seen at Urgent Care 5 days ago for cough and congestion with ear pain  She was treated with Cefdnir for right ear infection  She has had imrpvmt in her cough  She was also found to have impacted cerumen  She has been using Debrox  She has no further ear pain, but does have te sensation that there may be fluid or wax in the ear  The following portions of the patient's history were reviewed and updated as appropriate:   She  has a past medical history of Choledocholithiasis (2017) and GERD (gastroesophageal reflux disease)  She   Patient Active Problem List    Diagnosis Date Noted    Impacted cerumen of right ear 2018    Choledocholithiasis 2017    Acute cholecystitis 2017     She  has a past surgical history that includes  section; CHOLECYSTECTOMY LAPAROSCOPIC (N/A, 2017); Cholecystectomy; and ERCP (N/A, 2017)  Her family history includes Cancer in her father; Hodgkin's lymphoma in her mother; Hypertension in her father  She  reports that she has been smoking Cigarettes  She has been smoking about 0 20 packs per day  She has never used smokeless tobacco  She reports that she drinks alcohol  She reports that she does not use drugs  Current Outpatient Prescriptions   Medication Sig Dispense Refill    brompheniramine-pseudoephedrine-DM 30-2-10 MG/5ML syrup Take 5 mL by mouth 4 (four) times a day as needed for allergies 118 mL 0    cefdinir (OMNICEF) 300 mg capsule Take 1 capsule (300 mg total) by mouth every 12 (twelve) hours for 7 days 14 capsule 0    nicotine (NICODERM CQ) 14 mg/24hr TD 24 hr patch Place 1 patch on the skin daily 28 patch 0     No current facility-administered medications for this visit  Current Outpatient Prescriptions on File Prior to Visit   Medication Sig    brompheniramine-pseudoephedrine-DM 30-2-10 MG/5ML syrup Take 5 mL by mouth 4 (four) times a day as needed for allergies    cefdinir (OMNICEF) 300 mg capsule Take 1 capsule (300 mg total) by mouth every 12 (twelve) hours for 7 days    nicotine (NICODERM CQ) 14 mg/24hr TD 24 hr patch Place 1 patch on the skin daily     No current facility-administered medications on file prior to visit  She is allergic to bactrim [sulfamethoxazole-trimethoprim]; ciprofloxacin; erythromycin; levaquin [levofloxacin]; penicillins; valtrex [valacyclovir]; and keflex [cephalexin]       Review of Systems   Constitutional: Negative for fatigue and fever  HENT: Positive for ear pain  Eyes: Negative  Respiratory: Negative  Cardiovascular: Negative  Gastrointestinal: Negative  Endocrine: Negative  Musculoskeletal: Negative  Skin: Negative  Allergic/Immunologic: Negative  Neurological: Negative  Hematological: Negative  Psychiatric/Behavioral: Negative  All other systems reviewed and are negative  Objective:      /78   Pulse 100   Ht 5' 4" (1 626 m)   Wt 62 6 kg (138 lb)   SpO2 95%   BMI 23 69 kg/m²          Physical Exam   Constitutional: She is oriented to person, place, and time  She appears well-developed and well-nourished  No distress  HENT:   Head: Normocephalic and atraumatic  Left Ear: External ear normal    Nose: Nose normal    Mouth/Throat: Oropharynx is clear and moist  No oropharyngeal exudate  Right ear impacted cerumen  Unable to remove with irrigation   Eyes: Conjunctivae and EOM are normal  Pupils are equal, round, and reactive to light  Neck: Normal range of motion  Neck supple  Cardiovascular: Normal rate, regular rhythm and normal heart sounds  Exam reveals no gallop and no friction rub  No murmur heard    Pulmonary/Chest: Effort normal and breath sounds normal  No respiratory distress  She has no wheezes  She has no rales  Abdominal: Soft  Musculoskeletal: Normal range of motion  Lymphadenopathy:     She has no cervical adenopathy  Neurological: She is alert and oriented to person, place, and time  Skin: Skin is warm and dry  No rash noted  She is not diaphoretic  Psychiatric: She has a normal mood and affect  Her behavior is normal  Judgment and thought content normal    Nursing note and vitals reviewed  Ear cerumen removal  Date/Time: 3/8/2018 10:11 AM  Performed by: Esteban Mary by: Cat Patrick     Patient location:  Bedside  Indications / Diagnosis:  Cerumen impaction  Consent:     Consent obtained:  Verbal    Consent given by:  Patient    Risks discussed:  Bleeding, infection, incomplete removal, dizziness, pain and TM perforation  Universal protocol:     Procedure explained and questions answered to patient or proxy's satisfaction: yes    Procedure details:     Location:  R ear, external auditory canal and inner ear    Procedure type: irrigation      Approach:  External  Post-procedure details:     Complication:  None    Hearing quality:  Diminished  Comments:      Unable to remove cerumen from right ear  Pt to return next week

## 2018-03-08 NOTE — PROGRESS NOTES
Assessment/Plan:    No problem-specific Assessment & Plan notes found for this encounter  There are no diagnoses linked to this encounter  Subjective:      Patient ID: Jenn Cedeno is a 47 y o  female  HPI    The following portions of the patient's history were reviewed and updated as appropriate:   She  has a past medical history of Choledocholithiasis (2017) and GERD (gastroesophageal reflux disease)  She   Patient Active Problem List    Diagnosis Date Noted    Choledocholithiasis 2017    Acute cholecystitis 2017     She  has a past surgical history that includes  section; CHOLECYSTECTOMY LAPAROSCOPIC (N/A, 2017); Cholecystectomy; and ERCP (N/A, 2017)  Her family history includes Cancer in her father; Hodgkin's lymphoma in her mother; Hypertension in her father  She  reports that she has been smoking Cigarettes  She has been smoking about 0 20 packs per day  She has never used smokeless tobacco  She reports that she drinks alcohol  She reports that she does not use drugs  Current Outpatient Prescriptions   Medication Sig Dispense Refill    brompheniramine-pseudoephedrine-DM 30-2-10 MG/5ML syrup Take 5 mL by mouth 4 (four) times a day as needed for allergies 118 mL 0    cefdinir (OMNICEF) 300 mg capsule Take 1 capsule (300 mg total) by mouth every 12 (twelve) hours for 7 days 14 capsule 0    nicotine (NICODERM CQ) 14 mg/24hr TD 24 hr patch Place 1 patch on the skin daily 28 patch 0     No current facility-administered medications for this visit        Current Outpatient Prescriptions on File Prior to Visit   Medication Sig    brompheniramine-pseudoephedrine-DM 30-2-10 MG/5ML syrup Take 5 mL by mouth 4 (four) times a day as needed for allergies    cefdinir (OMNICEF) 300 mg capsule Take 1 capsule (300 mg total) by mouth every 12 (twelve) hours for 7 days    nicotine (NICODERM CQ) 14 mg/24hr TD 24 hr patch Place 1 patch on the skin daily     No current facility-administered medications on file prior to visit  She is allergic to bactrim [sulfamethoxazole-trimethoprim]; ciprofloxacin; erythromycin; levaquin [levofloxacin]; penicillins; valtrex [valacyclovir]; and keflex [cephalexin]       Review of Systems   Constitutional: Negative for fatigue and fever  HENT: Positive for ear pain  Eyes: Negative  Respiratory: Negative  Cardiovascular: Negative  Endocrine: Negative  Allergic/Immunologic: Negative for immunocompromised state  Hematological: Negative for adenopathy  All other systems reviewed and are negative  Objective: There were no vitals taken for this visit  Physical Exam   Constitutional: She is oriented to person, place, and time  She appears well-developed and well-nourished  No distress  HENT:   Head: Normocephalic and atraumatic  Right Ear: External ear normal    Left Ear: External ear normal    Nose: Nose normal    Mouth/Throat: Oropharynx is clear and moist  No oropharyngeal exudate  Eyes: Conjunctivae and EOM are normal  Pupils are equal, round, and reactive to light  Neck: Normal range of motion  Neck supple  Cardiovascular: Normal rate, regular rhythm and normal heart sounds  Exam reveals no gallop and no friction rub  No murmur heard  Pulmonary/Chest: Effort normal and breath sounds normal  No respiratory distress  She has no wheezes  She has no rales  Abdominal: Soft  Musculoskeletal: Normal range of motion  Lymphadenopathy:     She has no cervical adenopathy  Neurological: She is alert and oriented to person, place, and time  Skin: Skin is warm and dry  No rash noted  She is not diaphoretic  Psychiatric: She has a normal mood and affect  Her behavior is normal  Judgment and thought content normal    Nursing note and vitals reviewed

## 2018-03-08 NOTE — PATIENT INSTRUCTIONS
Cerumen impaction- use Debrox daily  Avoid using hearing aid if possible   Return next week for cerumen removal    Labs were reviewed and are normal

## 2018-03-12 ENCOUNTER — OFFICE VISIT (OUTPATIENT)
Dept: FAMILY MEDICINE CLINIC | Facility: CLINIC | Age: 53
End: 2018-03-12
Payer: COMMERCIAL

## 2018-03-12 VITALS
HEART RATE: 80 BPM | DIASTOLIC BLOOD PRESSURE: 80 MMHG | BODY MASS INDEX: 23.56 KG/M2 | HEIGHT: 64 IN | TEMPERATURE: 97.9 F | WEIGHT: 138 LBS | RESPIRATION RATE: 18 BRPM | OXYGEN SATURATION: 97 % | SYSTOLIC BLOOD PRESSURE: 118 MMHG

## 2018-03-12 DIAGNOSIS — K90.0 TRANSIENT GLUTEN SENSITIVITY: ICD-10-CM

## 2018-03-12 DIAGNOSIS — H61.21 IMPACTED CERUMEN OF RIGHT EAR: Primary | ICD-10-CM

## 2018-03-12 PROCEDURE — 69209 REMOVE IMPACTED EAR WAX UNI: CPT | Performed by: NURSE PRACTITIONER

## 2018-03-12 PROCEDURE — 99213 OFFICE O/P EST LOW 20 MIN: CPT | Performed by: NURSE PRACTITIONER

## 2018-03-12 NOTE — PATIENT INSTRUCTIONS
Cerumen of right ear- could possibly be residual wax from molds used for hearing aide  60-70% imprvmnt from last week  Continue debrox  May try candle wick  Check tTGA-IgA-->must eat gluten diet for effectiveness of testing

## 2018-03-12 NOTE — PROGRESS NOTES
Assessment/Plan:    No problem-specific Assessment & Plan notes found for this encounter  Diagnoses and all orders for this visit:    Impacted cerumen of right ear    Transient gluten sensitivity  -     Tissue transglutaminase, IgA; Future    Other orders  -     Ear cerumen removal          Subjective:      Patient ID: Koko Puentes is a 47 y o  female  Pt is here to have ear wax removed  She was in office last week and not all cerumen was able to be removed from right ear at that time  The following portions of the patient's history were reviewed and updated as appropriate: She is allergic to bactrim [sulfamethoxazole-trimethoprim]; ciprofloxacin; erythromycin; levaquin [levofloxacin]; penicillins; valtrex [valacyclovir]; and keflex [cephalexin]       Review of Systems   Constitutional: Negative  HENT:        Cerumen build up   Eyes: Negative  Allergic/Immunologic: Negative for immunocompromised state  Neurological: Negative for dizziness, light-headedness and headaches  All other systems reviewed and are negative  Objective:      /80 (BP Location: Left arm, Patient Position: Sitting)   Pulse 80   Temp 97 9 °F (36 6 °C)   Resp 18   Ht 5' 4" (1 626 m)   Wt 62 6 kg (138 lb)   SpO2 97%   BMI 23 69 kg/m²          Physical Exam   Constitutional: She is oriented to person, place, and time  She appears well-developed and well-nourished  No distress  HENT:   Head: Normocephalic and atraumatic  Right EAC imprvmnt in presence of cerumen  Eyes: Conjunctivae and EOM are normal  Pupils are equal, round, and reactive to light  Neck: Normal range of motion  Cardiovascular: Normal rate  Pulmonary/Chest: Effort normal    Musculoskeletal: Normal range of motion  Neurological: She is alert and oriented to person, place, and time  Skin: Skin is warm and dry  Psychiatric: She has a normal mood and affect   Her behavior is normal  Judgment and thought content normal  Nursing note and vitals reviewed  Ear cerumen removal  Date/Time: 3/12/2018 3:51 PM  Performed by: Liam Hidden by: Abilio Hagen     Patient location:  Bedside  Consent:     Consent obtained:  Verbal    Consent given by:  Patient    Risks discussed:  Infection, incomplete removal, pain, dizziness and TM perforation  Procedure details:     Location:  R ear and external auditory canal    Procedure type: irrigation      Approach:  External and internal    Visualization (free text): White colored wax , thin layer still present   Could be wax used for mild of hearing aide

## 2018-05-22 ENCOUNTER — OFFICE VISIT (OUTPATIENT)
Dept: FAMILY MEDICINE CLINIC | Facility: CLINIC | Age: 53
End: 2018-05-22
Payer: COMMERCIAL

## 2018-05-22 ENCOUNTER — APPOINTMENT (OUTPATIENT)
Dept: RADIOLOGY | Facility: CLINIC | Age: 53
End: 2018-05-22
Payer: COMMERCIAL

## 2018-05-22 VITALS
HEART RATE: 103 BPM | SYSTOLIC BLOOD PRESSURE: 158 MMHG | WEIGHT: 138.25 LBS | BODY MASS INDEX: 23.6 KG/M2 | OXYGEN SATURATION: 98 % | DIASTOLIC BLOOD PRESSURE: 84 MMHG | HEIGHT: 64 IN | RESPIRATION RATE: 18 BRPM

## 2018-05-22 DIAGNOSIS — B37.0 ORAL CANDIDIASIS: ICD-10-CM

## 2018-05-22 DIAGNOSIS — M77.8 TENDONITIS OF SHOULDER, RIGHT: ICD-10-CM

## 2018-05-22 DIAGNOSIS — M25.511 ACUTE PAIN OF RIGHT SHOULDER: ICD-10-CM

## 2018-05-22 DIAGNOSIS — M25.511 ACUTE PAIN OF RIGHT SHOULDER: Primary | ICD-10-CM

## 2018-05-22 PROCEDURE — 73030 X-RAY EXAM OF SHOULDER: CPT

## 2018-05-22 PROCEDURE — 99214 OFFICE O/P EST MOD 30 MIN: CPT | Performed by: NURSE PRACTITIONER

## 2018-05-22 RX ORDER — IBUPROFEN 800 MG/1
800 TABLET ORAL EVERY 8 HOURS PRN
Qty: 90 TABLET | Refills: 0 | Status: SHIPPED | OUTPATIENT
Start: 2018-05-22 | End: 2019-03-20 | Stop reason: SDUPTHER

## 2018-05-22 RX ORDER — PREDNISONE 20 MG/1
TABLET ORAL
Qty: 9 TABLET | Refills: 0 | Status: SHIPPED | OUTPATIENT
Start: 2018-05-22 | End: 2018-09-07 | Stop reason: ALTCHOICE

## 2018-05-22 RX ORDER — KETOROLAC TROMETHAMINE 30 MG/ML
30 INJECTION, SOLUTION INTRAMUSCULAR; INTRAVENOUS ONCE
Status: COMPLETED | OUTPATIENT
Start: 2018-05-22 | End: 2018-05-22

## 2018-05-22 RX ADMIN — KETOROLAC TROMETHAMINE 30 MG: 30 INJECTION, SOLUTION INTRAMUSCULAR; INTRAVENOUS at 10:48

## 2018-05-22 NOTE — PATIENT INSTRUCTIONS
Right shoulder pain- Obtain xray  Refill Ibuprofen  Apply moist heat  Take Ibuprofen   Oral candidiasis- refill nystatin    Tendinitis   WHAT YOU NEED TO KNOW:   What is tendinitis? Tendinitis is painful inflammation or breakdown of your tendons  It may also be called tendinopathy  Tendinitis often occurs in the knee, shoulder, ankle, hip, or elbow  What increases my risk for tendinitis? · Injury, overuse, or repeated movement of a joint     · Not warming up before exercise, or not resting enough between activities    · Use of shoes or exercise equipment that do not fit well    · Muscle weakness, balance problems, or poor posture  What are the signs and symptoms of tendinitis? You may have redness, pain, or swelling around your joint, tendon, or muscle  You may also have pain, stiffness, or decreased movement in your joint  How is tendinitis diagnosed? Your healthcare provider will check your range of motion of the affected joint  He may also lightly press on your tendon to check for pain  You may also need an ultrasound, x-ray, or MRI to show if you have tendinitis or another condition  How is tendinitis treated? · Pain medicines  such as NSAIDs and acetaminophen may decrease swelling and pain  These medicines are available without a doctor's order  Ask how much to take and when to take it  Follow directions  NSAIDs and acetaminophen may cause liver or kidney damage if not taken correctly  · Steroids  may be used to decrease pain and swelling  They may be given as a pill or as an injection into the affected area  Steroids are rarely used in children  · Surgery  may rarely be needed if other treatment does not work  How can I manage my symptoms? · Rest  your tendon as directed to help it heal  Ask your healthcare provider if you need to stop putting weight on your affected area  · Ice  helps decrease swelling and pain, and may help prevent tissue damage   Use an ice pack, or put crushed ice in a plastic bag  Cover it with a towel and place it on the affected area for 10 to 15 minutes every hour or as directed  · Support devices  such as a cane, splint, shoe insert, or brace may help reduce your pain  · Physical therapy  may be ordered by your healthcare provider  This may be used to teach you exercises to help improve movement and strength, and to decrease pain  You may also learn how to improve your posture, and how to lift or exercise correctly  How can I prevent tendinitis? · Warm up or stretch  before you exercise  · Exercise regularly  to strengthen the muscles around your joint  Ease into an exercise routine for the first 3 weeks to prevent another injury  Ask your healthcare provider about the best exercise plan for you  Rest fully between activities  · Use the right equipment  for sports and exercise  Wear braces or tape around weak joints as directed  When should I contact my healthcare provider? · You have increased pain even after you take medicine  · You have questions or concerns about your condition or care  When should I seek immediate help? · You have increased redness over the joint, or swelling in the joint  · You suddenly cannot move your joint  CARE AGREEMENT:   You have the right to help plan your care  Learn about your health condition and how it may be treated  Discuss treatment options with your caregivers to decide what care you want to receive  You always have the right to refuse treatment  The above information is an  only  It is not intended as medical advice for individual conditions or treatments  Talk to your doctor, nurse or pharmacist before following any medical regimen to see if it is safe and effective for you  © 2017 [address] [employer] Information is for End User's use only and may not be sold, redistributed or otherwise used for commercial purposes   All illustrations and images included in CareNotes® are the copyrighted property of Ana Lilia PAREDES  or Dmitriy Miranda

## 2018-05-22 NOTE — PROGRESS NOTES
Assessment/Plan:    No problem-specific Assessment & Plan notes found for this encounter  Diagnoses and all orders for this visit:    Acute pain of right shoulder  -     ketorolac (TORADOL) injection 30 mg; Inject 1 mL (30 mg total) into the shoulder, thigh, or buttocks once   -     XR shoulder 2+ vw right; Future  -     Ambulatory referral to Orthopedic Surgery; Future    Tendonitis of shoulder, right  -     ketorolac (TORADOL) injection 30 mg; Inject 1 mL (30 mg total) into the shoulder, thigh, or buttocks once   -     ibuprofen (MOTRIN) 800 mg tablet; Take 1 tablet (800 mg total) by mouth every 8 (eight) hours as needed for mild pain or moderate pain for up to 30 days  -     predniSONE 20 mg tablet; Take 1 tabs twice a day for 3 days then 1 tab daily for 3 days  -     Ambulatory referral to Orthopedic Surgery; Future    Oral candidiasis  -     nystatin (MYCOSTATIN) 100,000 units/mL suspension; Apply 5 mL (500,000 Units total) to the mouth or throat 4 (four) times a day for 10 days          Subjective:      Patient ID: Kyrie Jennings is a 46 y o  female  Pt is here with pain in right shoulder, for past week  She awakened from sleep in am with pain in hand and wrist  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 due to pain  This seemed to resolve but she then developed pain in right shoulder  She has very limited ROM in al directions and pain all around the shoulder joint  No swelling  Feels like she is having oral thrush recurr  The following portions of the patient's history were reviewed and updated as appropriate:   She  has a past medical history of Choledocholithiasis (09/07/2017) and GERD (gastroesophageal reflux disease)    She   Patient Active Problem List    Diagnosis Date Noted    Acute pain of right shoulder 05/22/2018    Tendonitis of shoulder, right 05/22/2018    Impacted cerumen of right ear 03/08/2018    Choledocholithiasis 09/07/2017    Acute cholecystitis 2017     She  has a past surgical history that includes  section; CHOLECYSTECTOMY LAPAROSCOPIC (N/A, 2017); Cholecystectomy; ERCP (N/A, 2017); and Dental surgery  Her family history includes Cancer in her father; Diabetes in her father; Hodgkin's lymphoma in her mother; Hypertension in her father  She  reports that she has been smoking Cigarettes  She has been smoking about 0 20 packs per day  She has never used smokeless tobacco  She reports that she drinks alcohol  She reports that she does not use drugs  Current Outpatient Prescriptions   Medication Sig Dispense Refill    brompheniramine-pseudoephedrine-DM 30-2-10 MG/5ML syrup Take 5 mL by mouth 4 (four) times a day as needed for allergies 118 mL 0    ibuprofen (MOTRIN) 800 mg tablet Take 1 tablet (800 mg total) by mouth every 8 (eight) hours as needed for mild pain or moderate pain for up to 30 days 90 tablet 0    nicotine (NICODERM CQ) 14 mg/24hr TD 24 hr patch Place 1 patch on the skin daily 28 patch 0    nystatin (MYCOSTATIN) 100,000 units/mL suspension Apply 5 mL (500,000 Units total) to the mouth or throat 4 (four) times a day for 10 days 60 mL 0    predniSONE 20 mg tablet Take 1 tabs twice a day for 3 days then 1 tab daily for 3 days  9 tablet 0     Current Facility-Administered Medications   Medication Dose Route Frequency Provider Last Rate Last Dose    ketorolac (TORADOL) injection 30 mg  30 mg Intramuscular Once SULEIMAN Terry         Current Outpatient Prescriptions on File Prior to Visit   Medication Sig    brompheniramine-pseudoephedrine-DM 30-2-10 MG/5ML syrup Take 5 mL by mouth 4 (four) times a day as needed for allergies    nicotine (NICODERM CQ) 14 mg/24hr TD 24 hr patch Place 1 patch on the skin daily     No current facility-administered medications on file prior to visit        She is allergic to bactrim [sulfamethoxazole-trimethoprim]; ciprofloxacin; erythromycin; levaquin [levofloxacin]; penicillins; valtrex [valacyclovir]; and keflex [cephalexin]       Review of Systems   HENT: Negative  Coating on tongue and"funny taste in mouth"   Respiratory: Negative  Endocrine: Negative  Musculoskeletal: Negative for joint swelling  Pain right wrist radiating up to right shoulder   Skin: Negative  Allergic/Immunologic: Negative for immunocompromised state  Neurological: Negative  Negative for tremors, weakness and numbness  Hematological: Negative  Negative for adenopathy  All other systems reviewed and are negative  Objective:      /84 (BP Location: Left arm, Patient Position: Sitting)   Pulse 103   Resp 18   Ht 5' 4" (1 626 m)   Wt 62 7 kg (138 lb 4 oz)   SpO2 98%   BMI 23 73 kg/m²          Physical Exam   Constitutional: She is oriented to person, place, and time  She appears well-developed and well-nourished  No distress  HENT:   Head: Normocephalic and atraumatic  Mouth/Throat: Oropharynx is clear and moist  No oropharyngeal exudate  Eyes: Conjunctivae and EOM are normal  Pupils are equal, round, and reactive to light  Neck: Normal range of motion  Neck supple  Cardiovascular: Normal rate  Exam reveals no gallop and no friction rub  No murmur heard  Pulmonary/Chest: Effort normal  No respiratory distress  She has no wheezes  She has no rales  Musculoskeletal: Normal range of motion  She exhibits tenderness  She exhibits no edema  Anterior raise has pain at 20 degrees  Lateral raise, pain at 50 degrees  + tenderness to point palpation anterior and lateral deltoid  Lymphadenopathy:     She has no cervical adenopathy  Neurological: She is alert and oriented to person, place, and time  Skin: Skin is warm and dry  No rash noted  She is not diaphoretic  Psychiatric: She has a normal mood and affect  Her behavior is normal  Judgment and thought content normal    Nursing note and vitals reviewed

## 2018-07-02 ENCOUNTER — TELEPHONE (OUTPATIENT)
Dept: FAMILY MEDICINE CLINIC | Facility: CLINIC | Age: 53
End: 2018-07-02

## 2018-07-02 DIAGNOSIS — N30.00 ACUTE CYSTITIS WITHOUT HEMATURIA: Primary | ICD-10-CM

## 2018-07-02 RX ORDER — NITROFURANTOIN MACROCRYSTALS 100 MG/1
100 CAPSULE ORAL 4 TIMES DAILY
Qty: 28 CAPSULE | Refills: 0 | Status: SHIPPED | OUTPATIENT
Start: 2018-07-02 | End: 2018-07-09

## 2018-09-07 ENCOUNTER — OFFICE VISIT (OUTPATIENT)
Dept: FAMILY MEDICINE CLINIC | Facility: CLINIC | Age: 53
End: 2018-09-07
Payer: COMMERCIAL

## 2018-09-07 VITALS
DIASTOLIC BLOOD PRESSURE: 82 MMHG | RESPIRATION RATE: 16 BRPM | WEIGHT: 141 LBS | OXYGEN SATURATION: 97 % | SYSTOLIC BLOOD PRESSURE: 132 MMHG | HEIGHT: 64 IN | BODY MASS INDEX: 24.07 KG/M2 | HEART RATE: 112 BPM | TEMPERATURE: 99.2 F

## 2018-09-07 DIAGNOSIS — R05.8 PRODUCTIVE COUGH: ICD-10-CM

## 2018-09-07 DIAGNOSIS — J01.00 ACUTE NON-RECURRENT MAXILLARY SINUSITIS: Primary | ICD-10-CM

## 2018-09-07 PROCEDURE — 3008F BODY MASS INDEX DOCD: CPT | Performed by: FAMILY MEDICINE

## 2018-09-07 PROCEDURE — 99214 OFFICE O/P EST MOD 30 MIN: CPT | Performed by: FAMILY MEDICINE

## 2018-09-07 PROCEDURE — 4004F PT TOBACCO SCREEN RCVD TLK: CPT | Performed by: FAMILY MEDICINE

## 2018-09-07 RX ORDER — FLUTICASONE PROPIONATE 50 MCG
1 SPRAY, SUSPENSION (ML) NASAL DAILY
Qty: 16 G | Refills: 0 | Status: SHIPPED | OUTPATIENT
Start: 2018-09-07 | End: 2018-10-04 | Stop reason: SDUPTHER

## 2018-09-07 RX ORDER — CEFDINIR 300 MG/1
300 CAPSULE ORAL EVERY 12 HOURS SCHEDULED
Qty: 10 CAPSULE | Refills: 0 | Status: SHIPPED | OUTPATIENT
Start: 2018-09-07 | End: 2018-09-12

## 2018-09-07 NOTE — PROGRESS NOTES
Assessment/Plan:    No problem-specific Assessment & Plan notes found for this encounter  Diagnoses and all orders for this visit:    Acute non-recurrent maxillary sinusitis  after discussing risks and benefits along with side effects will start the following:  -     cefdinir (OMNICEF) 300 mg capsule; Take 1 capsule (300 mg total) by mouth every 12 (twelve) hours for 5 days  -     fluticasone (FLONASE) 50 mcg/act nasal spray; 1 spray into each nostril daily  -     loratadine-pseudoephedrine (CLARITIN-D 12-HOUR) 5-120 mg per tablet; Take 1 tablet by mouth 2 (two) times a day for 5 days      Follow up in 1 week or as needed    Subjective:      Patient ID: Kyrie Jennings is a 46 y o  female  Sinus Pain  Patient complains of clear rhinorrhea, congestion, cough, facial pain, fevers, foul rhinorrhea, headaches, nasal congestion, post nasal drip, purulent rhinorrhea, sinus pressure and sore throat  Onset of symptoms was a few days ago  Symptoms have been gradually worsening since that time  She is drinking plenty of fluids  Past history is significant for nothing  Patient is smoker  (1 ppd x 30 yrs)  Her  was diagnosed with pneumonia  The following portions of the patient's history were reviewed and updated as appropriate:   She  has a past medical history of Choledocholithiasis (2017) and GERD (gastroesophageal reflux disease)  She   Patient Active Problem List    Diagnosis Date Noted    Acute non-recurrent maxillary sinusitis 2018    Acute pain of right shoulder 2018    Tendonitis of shoulder, right 2018    Impacted cerumen of right ear 2018    Choledocholithiasis 2017    Acute cholecystitis 2017     She  has a past surgical history that includes  section; CHOLECYSTECTOMY LAPAROSCOPIC (N/A, 2017); Cholecystectomy; ERCP (N/A, 2017); and Dental surgery    Her family history includes Cancer in her father; Diabetes in her father; Hodgkin's lymphoma in her mother; Hypertension in her father  She  reports that she has been smoking Cigarettes  She has been smoking about 0 20 packs per day  She has never used smokeless tobacco  She reports that she drinks alcohol  She reports that she does not use drugs  Current Outpatient Prescriptions   Medication Sig Dispense Refill    cefdinir (OMNICEF) 300 mg capsule Take 1 capsule (300 mg total) by mouth every 12 (twelve) hours for 5 days 10 capsule 0    fluticasone (FLONASE) 50 mcg/act nasal spray 1 spray into each nostril daily 16 g 0    ibuprofen (MOTRIN) 800 mg tablet Take 1 tablet (800 mg total) by mouth every 8 (eight) hours as needed for mild pain or moderate pain for up to 30 days 90 tablet 0    loratadine-pseudoephedrine (CLARITIN-D 12-HOUR) 5-120 mg per tablet Take 1 tablet by mouth 2 (two) times a day for 5 days 10 tablet 0    nicotine (NICODERM CQ) 14 mg/24hr TD 24 hr patch Place 1 patch on the skin daily (Patient not taking: Reported on 9/7/2018 ) 28 patch 0     No current facility-administered medications for this visit  Current Outpatient Prescriptions on File Prior to Visit   Medication Sig    ibuprofen (MOTRIN) 800 mg tablet Take 1 tablet (800 mg total) by mouth every 8 (eight) hours as needed for mild pain or moderate pain for up to 30 days    nicotine (NICODERM CQ) 14 mg/24hr TD 24 hr patch Place 1 patch on the skin daily (Patient not taking: Reported on 9/7/2018 )    [DISCONTINUED] brompheniramine-pseudoephedrine-DM 30-2-10 MG/5ML syrup Take 5 mL by mouth 4 (four) times a day as needed for allergies (Patient not taking: Reported on 9/7/2018 )    [DISCONTINUED] predniSONE 20 mg tablet Take 1 tabs twice a day for 3 days then 1 tab daily for 3 days  No current facility-administered medications on file prior to visit        She is allergic to bactrim [sulfamethoxazole-trimethoprim]; ciprofloxacin; erythromycin; erythromycin base; levaquin [levofloxacin]; penicillins; valtrex [valacyclovir]; and keflex [cephalexin]       Review of Systems   Constitutional: Negative for activity change, appetite change, fatigue and fever  HENT: Positive for postnasal drip, rhinorrhea, sinus pain and sinus pressure  Negative for congestion and ear discharge  Respiratory: Positive for cough  Negative for shortness of breath  Cardiovascular: Negative for chest pain and palpitations  Gastrointestinal: Negative for diarrhea and nausea  Musculoskeletal: Negative for arthralgias and back pain  Skin: Negative for color change and rash  Neurological: Negative for dizziness and headaches  Psychiatric/Behavioral: Negative for agitation and behavioral problems  Objective:      /82   Pulse (!) 112   Temp 99 2 °F (37 3 °C) (Tympanic)   Resp 16   Ht 5' 4" (1 626 m)   Wt 64 kg (141 lb)   SpO2 97%   BMI 24 20 kg/m²          Physical Exam   Constitutional: She is oriented to person, place, and time  She appears well-developed and well-nourished  No distress  HENT:   Head: Normocephalic and atraumatic  Nose: Nose normal    Mouth/Throat: Oropharynx is clear and moist    Eyes: Conjunctivae are normal  Pupils are equal, round, and reactive to light  Cardiovascular: Normal rate, regular rhythm and normal heart sounds  No murmur heard  Pulmonary/Chest: Effort normal and breath sounds normal  No respiratory distress  She has no wheezes  Abdominal: Soft  Bowel sounds are normal  She exhibits no distension  There is no tenderness  Neurological: She is alert and oriented to person, place, and time  Skin: Skin is warm and dry  No rash noted  She is not diaphoretic  No erythema  Psychiatric: She has a normal mood and affect

## 2018-09-07 NOTE — LETTER
September 7, 2018     Patient: Gio Zayas   YOB: 1965   Date of Visit: 9/7/2018       To Whom it May Concern:    Gio Zayas is under my professional care  She was seen in my office on 9/7/2018  She may return to work on 09/10/2018  If you have any questions or concerns, please don't hesitate to call           Sincerely,          Erik Haddad MD        CC: No Recipients

## 2018-10-04 DIAGNOSIS — J01.00 ACUTE NON-RECURRENT MAXILLARY SINUSITIS: ICD-10-CM

## 2018-10-04 RX ORDER — FLUTICASONE PROPIONATE 50 MCG
SPRAY, SUSPENSION (ML) NASAL
Qty: 1 BOTTLE | Refills: 1 | Status: SHIPPED | OUTPATIENT
Start: 2018-10-04 | End: 2019-04-26 | Stop reason: SDUPTHER

## 2018-12-14 ENCOUNTER — OFFICE VISIT (OUTPATIENT)
Dept: FAMILY MEDICINE CLINIC | Facility: CLINIC | Age: 53
End: 2018-12-14
Payer: COMMERCIAL

## 2018-12-14 VITALS
BODY MASS INDEX: 24.79 KG/M2 | DIASTOLIC BLOOD PRESSURE: 84 MMHG | SYSTOLIC BLOOD PRESSURE: 122 MMHG | OXYGEN SATURATION: 98 % | TEMPERATURE: 100.8 F | WEIGHT: 145.2 LBS | HEART RATE: 111 BPM | HEIGHT: 64 IN

## 2018-12-14 DIAGNOSIS — R50.9 FEVER AND CHILLS: Primary | ICD-10-CM

## 2018-12-14 DIAGNOSIS — J02.9 SORE THROAT: ICD-10-CM

## 2018-12-14 DIAGNOSIS — J01.00 ACUTE NON-RECURRENT MAXILLARY SINUSITIS: ICD-10-CM

## 2018-12-14 PROBLEM — M77.8 TENDONITIS OF SHOULDER, RIGHT: Status: RESOLVED | Noted: 2018-05-22 | Resolved: 2018-12-14

## 2018-12-14 PROBLEM — K81.0 ACUTE CHOLECYSTITIS: Status: RESOLVED | Noted: 2017-09-06 | Resolved: 2018-12-14

## 2018-12-14 PROBLEM — M25.511 ACUTE PAIN OF RIGHT SHOULDER: Status: RESOLVED | Noted: 2018-05-22 | Resolved: 2018-12-14

## 2018-12-14 PROBLEM — H61.21 IMPACTED CERUMEN OF RIGHT EAR: Status: RESOLVED | Noted: 2018-03-08 | Resolved: 2018-12-14

## 2018-12-14 LAB
S PYO AG THROAT QL: NEGATIVE
SL AMB POCT RAPID FLU A: NORMAL
SL AMB POCT RAPID FLU B: NORMAL

## 2018-12-14 PROCEDURE — 3008F BODY MASS INDEX DOCD: CPT | Performed by: NURSE PRACTITIONER

## 2018-12-14 PROCEDURE — 87804 INFLUENZA ASSAY W/OPTIC: CPT | Performed by: NURSE PRACTITIONER

## 2018-12-14 PROCEDURE — 87880 STREP A ASSAY W/OPTIC: CPT | Performed by: NURSE PRACTITIONER

## 2018-12-14 PROCEDURE — 99213 OFFICE O/P EST LOW 20 MIN: CPT | Performed by: NURSE PRACTITIONER

## 2018-12-14 PROCEDURE — 4004F PT TOBACCO SCREEN RCVD TLK: CPT | Performed by: NURSE PRACTITIONER

## 2018-12-14 RX ORDER — CEFDINIR 300 MG/1
300 CAPSULE ORAL EVERY 12 HOURS SCHEDULED
Qty: 10 CAPSULE | Refills: 0 | Status: SHIPPED | OUTPATIENT
Start: 2018-12-14 | End: 2018-12-19

## 2018-12-14 NOTE — PROGRESS NOTES
Assessment/Plan:    No problem-specific Assessment & Plan notes found for this encounter  Diagnoses and all orders for this visit:    Acute non-recurrent maxillary sinusitis  Comments:  will treat with the omnicef twice a day for five days   Orders:  -     cefdinir (OMNICEF) 300 mg capsule; Take 1 capsule (300 mg total) by mouth every 12 (twelve) hours for 5 days    Sore throat  -     POCT rapid strepA  -     cefdinir (OMNICEF) 300 mg capsule; Take 1 capsule (300 mg total) by mouth every 12 (twelve) hours for 5 days          Subjective:      Patient ID: Agatha Austin is a 46 y o  female  Patient here with headache sore throat body aches and fevers and positive sick contacts with similar symptoms starting a few days ago with a sore throat and has progressed since there and having the chills and sore throat eating ice chips has a cough and sinus pain and pressure body aches and also having ear pains  No flu shot this season  Earache    Associated symptoms include coughing, rhinorrhea and a sore throat  Pertinent negatives include no hearing loss  Sore Throat    Associated symptoms include congestion, coughing and ear pain  Pertinent negatives include no shortness of breath  The following portions of the patient's history were reviewed and updated as appropriate:   She  has a past medical history of Choledocholithiasis (2017) and GERD (gastroesophageal reflux disease)  She   Patient Active Problem List    Diagnosis Date Noted    Sore throat 2018    Acute non-recurrent maxillary sinusitis 2018    Choledocholithiasis 2017     She  has a past surgical history that includes  section; CHOLECYSTECTOMY LAPAROSCOPIC (N/A, 2017); Cholecystectomy; ERCP (N/A, 2017); and Dental surgery  Her family history includes Cancer in her father; Diabetes in her father; Hodgkin's lymphoma in her mother; Hypertension in her father    She  reports that she has been smoking Cigarettes  She has been smoking about 0 20 packs per day  She has never used smokeless tobacco  She reports that she drinks alcohol  She reports that she does not use drugs  She is allergic to bactrim [sulfamethoxazole-trimethoprim]; ciprofloxacin; erythromycin; erythromycin base; levaquin [levofloxacin]; penicillins; valtrex [valacyclovir]; and keflex [cephalexin]       Review of Systems   Constitutional: Positive for activity change, appetite change, chills, fatigue and fever  Negative for diaphoresis and unexpected weight change  HENT: Positive for congestion, ear pain, postnasal drip, rhinorrhea, sinus pain and sore throat  Negative for hearing loss, sinus pressure and sneezing  Eyes: Negative  Respiratory: Positive for cough  Negative for shortness of breath  Cardiovascular: Negative  Gastrointestinal: Negative  Endocrine: Negative  Musculoskeletal: Positive for myalgias  Negative for arthralgias  Psychiatric/Behavioral: Negative for behavioral problems and dysphoric mood  Objective:      /84 (Cuff Size: Adult)   Pulse (!) 111   Temp (!) 100 8 °F (38 2 °C) (Tympanic)   Ht 5' 4" (1 626 m)   Wt 65 9 kg (145 lb 3 2 oz)   SpO2 98%   BMI 24 92 kg/m²          Physical Exam   Constitutional: She is oriented to person, place, and time  She appears well-developed and well-nourished  HENT:   Head: Normocephalic and atraumatic  Right Ear: Hearing normal  There is tenderness  Left Ear: Hearing normal  There is tenderness  Nose: Right sinus exhibits maxillary sinus tenderness  Left sinus exhibits maxillary sinus tenderness  Mouth/Throat: Posterior oropharyngeal edema present  Eyes: Pupils are equal, round, and reactive to light  Neck: Normal range of motion  Cardiovascular: Normal rate, regular rhythm and normal heart sounds  Pulmonary/Chest: Effort normal and breath sounds normal    Abdominal: Normal appearance  Musculoskeletal: Normal range of motion  Neurological: She is oriented to person, place, and time  Skin: Skin is warm  Psychiatric: Her mood appears anxious  Nursing note and vitals reviewed

## 2018-12-14 NOTE — LETTER
December 14, 2018     Patient: Vasiliy Baez   YOB: 1965   Date of Visit: 12/14/2018       To Whom it May Concern:    Vasiliy Baez is under my professional care  She was seen in my office on 12/14/2018  She may return to work on 12/17/18  If you have any questions or concerns, please don't hesitate to call           Sincerely,          SULEIMAN Gurrola        CC: No Recipients

## 2018-12-18 ENCOUNTER — TELEPHONE (OUTPATIENT)
Dept: FAMILY MEDICINE CLINIC | Facility: CLINIC | Age: 53
End: 2018-12-18

## 2018-12-18 NOTE — TELEPHONE ENCOUNTER
Pt needs an extended work excuse to cover 12/17/18 returning 12/18/2018  please fax to [address] [phone]  [facility] call [phone] pt #

## 2019-03-19 NOTE — PROGRESS NOTES
Assessment/Plan:       Diagnoses and all orders for this visit:    Need for Tdap vaccination  -     TDAP VACCINE GREATER THAN OR EQUAL TO 6YO IM    Chronic right shoulder pain    Tendonitis of shoulder, right  -     ibuprofen (MOTRIN) 800 mg tablet; Take 1 tablet (800 mg total) by mouth every 8 (eight) hours as needed for mild pain or moderate pain for up to 30 days    Other orders  -     hyoscyamine (LEVSIN) 0 125 MG tablet; Place under the tongue  -     celecoxib (CeleBREX) 100 mg capsule; celecoxib 100 mg capsule        No problem-specific Assessment & Plan notes found for this encounter  Subjective:      Patient ID: Joya Arce is a 46 y o  female  Expecting a grandchild and has hesitancy about getting tdap vaccination  She is afraid of vaccination side effects  Her children, had reactions of "inconsolable crying and fevers when they were babies"  Needs refill Ibuprofen  The following portions of the patient's history were reviewed and updated as appropriate:   She has a past medical history of Choledocholithiasis (2017) and GERD (gastroesophageal reflux disease)  ,  does not have any pertinent problems on file  ,   has a past surgical history that includes  section; CHOLECYSTECTOMY LAPAROSCOPIC (N/A, 2017); Cholecystectomy; ERCP (N/A, 2017); and Dental surgery  ,  family history includes Cancer in her father; Diabetes in her father; Hodgkin's lymphoma in her mother; Hypertension in her father  ,   reports that she has been smoking cigarettes  She has been smoking about 0 20 packs per day  She has never used smokeless tobacco  She reports that she drinks alcohol  She reports that she does not use drugs  ,  is allergic to bactrim [sulfamethoxazole-trimethoprim]; ciprofloxacin; erythromycin; erythromycin base; gluten meal; lactose; levaquin [levofloxacin]; penicillins; tetracycline; valtrex [valacyclovir]; and keflex [cephalexin]     Current Outpatient Medications Medication Sig Dispense Refill    celecoxib (CeleBREX) 100 mg capsule celecoxib 100 mg capsule      fluticasone (FLONASE) 50 mcg/act nasal spray SPRAY 1 SPRAY INTO EACH NOSTRIL EVERY DAY 1 Bottle 1    hyoscyamine (LEVSIN) 0 125 MG tablet Place under the tongue      ibuprofen (MOTRIN) 800 mg tablet Take 1 tablet (800 mg total) by mouth every 8 (eight) hours as needed for mild pain or moderate pain for up to 30 days 90 tablet 0     No current facility-administered medications for this visit  Review of Systems   Constitutional: Negative  Negative for fatigue and fever  HENT: Negative  Negative for congestion  Eyes: Negative  Negative for visual disturbance  Respiratory: Negative for cough, chest tightness, shortness of breath and wheezing  Cardiovascular: Negative  Gastrointestinal: Negative  Negative for abdominal pain, blood in stool, diarrhea and nausea  Endocrine: Negative for polydipsia, polyphagia and polyuria  Genitourinary: Negative for difficulty urinating and flank pain  Musculoskeletal: Negative  Negative for arthralgias, back pain and myalgias  Skin: Negative  Negative for color change, pallor and rash  Allergic/Immunologic: Negative for immunocompromised state  Neurological: Negative  Negative for dizziness, weakness, light-headedness, numbness and headaches  Hematological: Negative for adenopathy  Psychiatric/Behavioral: Negative  Negative for confusion, decreased concentration and sleep disturbance  All other systems reviewed and are negative  Objective:  Vitals:    03/20/19 1528   BP: 140/88   BP Location: Left arm   Patient Position: Sitting   Pulse: 94   Resp: 18   SpO2: 98%   Weight: 67 6 kg (149 lb)   Height: 5' 4" (1 626 m)     Body mass index is 25 58 kg/m²  Physical Exam   Constitutional: She is oriented to person, place, and time  She appears well-developed and well-nourished  No distress     HENT:   Head: Normocephalic and atraumatic  Mouth/Throat: No oropharyngeal exudate  Eyes: Pupils are equal, round, and reactive to light  Conjunctivae are normal    Neck: Normal range of motion  Neck supple  Cardiovascular: Normal rate  Exam reveals no gallop and no friction rub  No murmur heard  Pulmonary/Chest: Effort normal  No respiratory distress  She has no wheezes  She has no rales  Abdominal: Soft  Musculoskeletal: Normal range of motion  Lymphadenopathy:     She has no cervical adenopathy  Neurological: She is alert and oriented to person, place, and time  Skin: Skin is warm and dry  No rash noted  She is not diaphoretic  Psychiatric: She has a normal mood and affect  Her behavior is normal  Judgment and thought content normal    Nursing note and vitals reviewed

## 2019-03-20 ENCOUNTER — OFFICE VISIT (OUTPATIENT)
Dept: FAMILY MEDICINE CLINIC | Facility: CLINIC | Age: 54
End: 2019-03-20
Payer: COMMERCIAL

## 2019-03-20 VITALS
WEIGHT: 149 LBS | RESPIRATION RATE: 18 BRPM | SYSTOLIC BLOOD PRESSURE: 140 MMHG | HEART RATE: 94 BPM | DIASTOLIC BLOOD PRESSURE: 88 MMHG | OXYGEN SATURATION: 98 % | HEIGHT: 64 IN | BODY MASS INDEX: 25.44 KG/M2

## 2019-03-20 DIAGNOSIS — M25.511 CHRONIC RIGHT SHOULDER PAIN: ICD-10-CM

## 2019-03-20 DIAGNOSIS — Z23 NEED FOR TDAP VACCINATION: Primary | ICD-10-CM

## 2019-03-20 DIAGNOSIS — M77.8 TENDONITIS OF SHOULDER, RIGHT: ICD-10-CM

## 2019-03-20 DIAGNOSIS — G89.29 CHRONIC RIGHT SHOULDER PAIN: ICD-10-CM

## 2019-03-20 PROBLEM — J01.00 ACUTE NON-RECURRENT MAXILLARY SINUSITIS: Status: RESOLVED | Noted: 2018-09-07 | Resolved: 2019-03-20

## 2019-03-20 PROBLEM — K80.50 CHOLEDOCHOLITHIASIS: Status: RESOLVED | Noted: 2017-09-07 | Resolved: 2019-03-20

## 2019-03-20 PROBLEM — J02.9 SORE THROAT: Status: RESOLVED | Noted: 2018-12-14 | Resolved: 2019-03-20

## 2019-03-20 PROCEDURE — 90471 IMMUNIZATION ADMIN: CPT | Performed by: NURSE PRACTITIONER

## 2019-03-20 PROCEDURE — 3008F BODY MASS INDEX DOCD: CPT | Performed by: NURSE PRACTITIONER

## 2019-03-20 PROCEDURE — 99214 OFFICE O/P EST MOD 30 MIN: CPT | Performed by: NURSE PRACTITIONER

## 2019-03-20 PROCEDURE — 90715 TDAP VACCINE 7 YRS/> IM: CPT | Performed by: NURSE PRACTITIONER

## 2019-03-20 PROCEDURE — 4004F PT TOBACCO SCREEN RCVD TLK: CPT | Performed by: NURSE PRACTITIONER

## 2019-03-20 RX ORDER — HYOSCYAMINE SULFATE 0.125 MG
TABLET ORAL
Status: ON HOLD | COMMUNITY
End: 2020-04-05 | Stop reason: ALTCHOICE

## 2019-03-20 RX ORDER — CELECOXIB 100 MG/1
CAPSULE ORAL
COMMUNITY
End: 2020-03-09 | Stop reason: ALTCHOICE

## 2019-03-20 RX ORDER — IBUPROFEN 800 MG/1
800 TABLET ORAL EVERY 8 HOURS PRN
Qty: 90 TABLET | Refills: 0 | Status: SHIPPED | OUTPATIENT
Start: 2019-03-20 | End: 2019-08-01 | Stop reason: SDUPTHER

## 2019-04-26 ENCOUNTER — OFFICE VISIT (OUTPATIENT)
Dept: FAMILY MEDICINE CLINIC | Facility: CLINIC | Age: 54
End: 2019-04-26
Payer: COMMERCIAL

## 2019-04-26 VITALS
DIASTOLIC BLOOD PRESSURE: 86 MMHG | WEIGHT: 144.2 LBS | HEIGHT: 64 IN | SYSTOLIC BLOOD PRESSURE: 136 MMHG | OXYGEN SATURATION: 94 % | HEART RATE: 103 BPM | BODY MASS INDEX: 24.62 KG/M2 | TEMPERATURE: 98.9 F

## 2019-04-26 DIAGNOSIS — J01.00 ACUTE NON-RECURRENT MAXILLARY SINUSITIS: Primary | ICD-10-CM

## 2019-04-26 PROCEDURE — 99213 OFFICE O/P EST LOW 20 MIN: CPT | Performed by: NURSE PRACTITIONER

## 2019-04-26 PROCEDURE — 3008F BODY MASS INDEX DOCD: CPT | Performed by: NURSE PRACTITIONER

## 2019-04-26 PROCEDURE — 4004F PT TOBACCO SCREEN RCVD TLK: CPT | Performed by: NURSE PRACTITIONER

## 2019-04-26 RX ORDER — CEFDINIR 300 MG/1
300 CAPSULE ORAL EVERY 12 HOURS SCHEDULED
Qty: 10 CAPSULE | Refills: 0 | Status: SHIPPED | OUTPATIENT
Start: 2019-04-26 | End: 2019-05-01

## 2019-04-26 RX ORDER — FLUTICASONE PROPIONATE 50 MCG
2 SPRAY, SUSPENSION (ML) NASAL DAILY
Qty: 1 BOTTLE | Refills: 1 | Status: ON HOLD | OUTPATIENT
Start: 2019-04-26 | End: 2020-04-05 | Stop reason: ALTCHOICE

## 2019-07-15 NOTE — PROGRESS NOTES
Assessment/Plan:       Diagnoses and all orders for this visit:    Right wrist pain  -     XR wrist 3+ vw right; Future        No problem-specific Assessment & Plan notes found for this encounter  Subjective:      Patient ID: Jass Melissa is a 53 y o  female  Patient is here to discuss pain in her right wrist over the past 4 weeks  She developed swelling in her right wrist and forearm  No redness  She started taking  mg  She is still having pain on the ulnar aspect and cannot do push ups  The following portions of the patient's history were reviewed and updated as appropriate:   She has a past medical history of Choledocholithiasis (2017) and GERD (gastroesophageal reflux disease)  ,  does not have any pertinent problems on file  ,   has a past surgical history that includes  section; CHOLECYSTECTOMY LAPAROSCOPIC (N/A, 2017); Cholecystectomy; ERCP (N/A, 2017); and Dental surgery  ,  family history includes Cancer in her father; Diabetes in her father; Hodgkin's lymphoma in her mother; Hypertension in her father  ,   reports that she has been smoking cigarettes  She has been smoking about 0 20 packs per day  She has never used smokeless tobacco  She reports that she drinks alcohol  She reports that she does not use drugs  ,  is allergic to bactrim [sulfamethoxazole-trimethoprim]; ciprofloxacin; erythromycin; erythromycin base; gluten meal; lactose; levaquin [levofloxacin]; penicillins; tetracycline; valtrex [valacyclovir]; and keflex [cephalexin]     Current Outpatient Medications   Medication Sig Dispense Refill    celecoxib (CeleBREX) 100 mg capsule celecoxib 100 mg capsule      fluticasone (FLONASE) 50 mcg/act nasal spray 2 sprays into each nostril daily for 30 days 1 Bottle 1    hyoscyamine (LEVSIN) 0 125 MG tablet Place under the tongue      ibuprofen (MOTRIN) 800 mg tablet Take 1 tablet (800 mg total) by mouth every 8 (eight) hours as needed for mild pain or moderate pain for up to 30 days 90 tablet 0     No current facility-administered medications for this visit  Review of Systems   Constitutional: Negative  Negative for fatigue and fever  HENT: Negative  Negative for congestion  Eyes: Negative  Negative for visual disturbance  Respiratory: Negative for cough, chest tightness, shortness of breath and wheezing  Cardiovascular: Negative  Gastrointestinal: Negative  Negative for abdominal pain, blood in stool, diarrhea and nausea  Endocrine: Negative for polydipsia, polyphagia and polyuria  Genitourinary: Negative for difficulty urinating and flank pain  Musculoskeletal: Negative for arthralgias, back pain and myalgias  Pain right wrist   Skin: Negative  Negative for color change, pallor and rash  Allergic/Immunologic: Negative for immunocompromised state  Neurological: Negative for dizziness, weakness, light-headedness, numbness and headaches  Hematological: Negative for adenopathy  Psychiatric/Behavioral: Negative  Negative for confusion, decreased concentration and sleep disturbance  All other systems reviewed and are negative  Objective:  Vitals:    07/16/19 1458   BP: 138/84   BP Location: Left arm   Patient Position: Sitting   Pulse: 80   Resp: 18   SpO2: 96%   Weight: 63 5 kg (140 lb)   Height: 5' 4" (1 626 m)     Body mass index is 24 03 kg/m²  Physical Exam   Constitutional: She is oriented to person, place, and time  She appears well-developed and well-nourished  No distress  HENT:   Head: Normocephalic and atraumatic  Mouth/Throat: No oropharyngeal exudate  Eyes: Pupils are equal, round, and reactive to light  Conjunctivae are normal    Neck: Normal range of motion  Neck supple  Cardiovascular: Normal rate, regular rhythm and normal heart sounds  Exam reveals no gallop and no friction rub  No murmur heard  Pulmonary/Chest: Effort normal and breath sounds normal  No respiratory distress   She has no wheezes  She has no rales  Abdominal: Soft  Musculoskeletal: Normal range of motion  She exhibits no edema, tenderness or deformity  Lymphadenopathy:     She has no cervical adenopathy  Neurological: She is alert and oriented to person, place, and time  Skin: Skin is warm and dry  No rash noted  She is not diaphoretic  Psychiatric: She has a normal mood and affect  Her behavior is normal  Judgment and thought content normal    Nursing note and vitals reviewed

## 2019-07-16 ENCOUNTER — OFFICE VISIT (OUTPATIENT)
Dept: FAMILY MEDICINE CLINIC | Facility: CLINIC | Age: 54
End: 2019-07-16
Payer: COMMERCIAL

## 2019-07-16 ENCOUNTER — APPOINTMENT (OUTPATIENT)
Dept: RADIOLOGY | Facility: CLINIC | Age: 54
End: 2019-07-16
Payer: COMMERCIAL

## 2019-07-16 VITALS
WEIGHT: 140 LBS | RESPIRATION RATE: 18 BRPM | HEIGHT: 64 IN | DIASTOLIC BLOOD PRESSURE: 84 MMHG | SYSTOLIC BLOOD PRESSURE: 138 MMHG | BODY MASS INDEX: 23.9 KG/M2 | OXYGEN SATURATION: 96 % | HEART RATE: 80 BPM

## 2019-07-16 DIAGNOSIS — M25.531 RIGHT WRIST PAIN: Primary | ICD-10-CM

## 2019-07-16 DIAGNOSIS — M25.531 RIGHT WRIST PAIN: ICD-10-CM

## 2019-07-16 PROCEDURE — 99213 OFFICE O/P EST LOW 20 MIN: CPT | Performed by: NURSE PRACTITIONER

## 2019-07-16 PROCEDURE — 73110 X-RAY EXAM OF WRIST: CPT

## 2019-07-16 NOTE — PATIENT INSTRUCTIONS
Pain and swelling right wrist- obtain xray of the wrist  Continue Ibuprofen as needed for pain  Apply compression brace as needed for comfort  The brace is Over the counter

## 2019-07-21 NOTE — RESULT ENCOUNTER NOTE
I informed pt that per my read last week, xray of wrist was normal  Radiologist has now confirmed that the xray is normal

## 2019-07-30 ENCOUNTER — OFFICE VISIT (OUTPATIENT)
Dept: FAMILY MEDICINE CLINIC | Facility: CLINIC | Age: 54
End: 2019-07-30
Payer: COMMERCIAL

## 2019-07-30 VITALS
WEIGHT: 140 LBS | RESPIRATION RATE: 18 BRPM | SYSTOLIC BLOOD PRESSURE: 144 MMHG | DIASTOLIC BLOOD PRESSURE: 68 MMHG | HEIGHT: 64 IN | OXYGEN SATURATION: 98 % | HEART RATE: 100 BPM | BODY MASS INDEX: 23.9 KG/M2

## 2019-07-30 DIAGNOSIS — B37.0 ORAL THRUSH: ICD-10-CM

## 2019-07-30 DIAGNOSIS — M77.8 DELTOID TENDONITIS OF LEFT SHOULDER: Primary | ICD-10-CM

## 2019-07-30 PROCEDURE — 96372 THER/PROPH/DIAG INJ SC/IM: CPT

## 2019-07-30 PROCEDURE — 4004F PT TOBACCO SCREEN RCVD TLK: CPT | Performed by: NURSE PRACTITIONER

## 2019-07-30 PROCEDURE — 3008F BODY MASS INDEX DOCD: CPT | Performed by: NURSE PRACTITIONER

## 2019-07-30 PROCEDURE — 99214 OFFICE O/P EST MOD 30 MIN: CPT | Performed by: NURSE PRACTITIONER

## 2019-07-30 RX ORDER — KETOROLAC TROMETHAMINE 30 MG/ML
30 INJECTION, SOLUTION INTRAMUSCULAR; INTRAVENOUS ONCE
Status: COMPLETED | OUTPATIENT
Start: 2019-07-30 | End: 2019-07-30

## 2019-07-30 RX ADMIN — KETOROLAC TROMETHAMINE 30 MG: 30 INJECTION, SOLUTION INTRAMUSCULAR; INTRAVENOUS at 13:00

## 2019-07-30 NOTE — PATIENT INSTRUCTIONS
Deltoid tendonitis of left shoulder  Toradol given in office  If no improvemnt in one week, follow up with Ortho    Calcific Tendinitis   WHAT YOU NEED TO KNOW:   Calcific tendinitis is a condition that occurs when calcium collects in the tendons of the shoulder  Tendons are bands of tissue that connect muscle to bone  The calcium can make the tendons swell and cause severe pain  DISCHARGE INSTRUCTIONS:   Medicines: You may need any of the following:  · NSAIDs  may decrease swelling and pain  This medicine can be bought with or without a doctor's order  This medicine can cause stomach bleeding or kidney problems in certain people  If you take blood thinner medicine, always ask your healthcare provider if NSAIDs are safe for you  Always read the medicine label and follow the directions on it before using this medicine  · Steroids  help decrease inflammation  · Take your medicine as directed  Contact your healthcare provider if you think your medicine is not helping or if you have side effects  Tell him or her if you are allergic to any medicine  Keep a list of the medicines, vitamins, and herbs you take  Include the amounts, and when and why you take them  Bring the list or the pill bottles to follow-up visits  Carry your medicine list with you in case of an emergency  Go to physical therapy:  A physical therapist can teach you exercises to help improve movement and strength, and to decrease pain  Apply ice:  Apply ice on your shoulder for 15 to 20 minutes every hour or as directed  Use an ice pack, or put crushed ice in a plastic bag  Cover it with a towel  Ice helps prevent tissue damage and decreases swelling and pain  Apply heat:  Apply heat on your shoulder for 20 to 30 minutes every 2 hours for as many days as directed  Heat helps decrease pain and muscle spasms    Rest your arm:  Healthcare providers may have you place an item, such as a ball, between your side and elbow while you rest  This may help decrease stiffness and pain  Follow up with your healthcare provider as directed:  Bring a list of any questions you have so you remember to ask them during your visits  Contact your healthcare provider if:   · You have worse pain and stiffness in your shoulder  · You have new or more trouble moving your arm  · You have questions or concerns about your condition or care  Return to the emergency department if:   · You cannot move your arm  · You have severe pain in your arm or shoulder  © 2017 [facility] Information is for End User's use only and may not be sold, redistributed or otherwise used for commercial purposes  All illustrations and images included in CareNotes® are the copyrighted property of [employer] D [employer]  or Dmitriy Miranda  The above information is an  only  It is not intended as medical advice for individual conditions or treatments  Talk to your doctor, nurse or pharmacist before following any medical regimen to see if it is safe and effective for you  er- toradol given for pain

## 2019-07-30 NOTE — PROGRESS NOTES
Assessment/Plan:       Diagnoses and all orders for this visit:    Deltoid tendonitis of left shoulder  -     ketorolac (TORADOL) injection 30 mg    Oral thrush  -     nystatin (MYCOSTATIN) 500,000 units/5 mL suspension; Apply 5 mL (500,000 Units total) to the mouth or throat 4 (four) times a day for 10 days        No problem-specific Assessment & Plan notes found for this encounter  Subjective:      Patient ID: Fiona Smith is a 53 y o  female  Patient is here with left side shoulder pain  She does have h/o tendonitis of that shoulder  She was recently trimming hedges  Pain has been present for 3 days  Pain radiates to the neck  She has been taking Ibu 800 mg daily and using warm moist heat  The following portions of the patient's history were reviewed and updated as appropriate:   She has a past medical history of Choledocholithiasis (2017) and GERD (gastroesophageal reflux disease)  ,  does not have any pertinent problems on file  ,   has a past surgical history that includes  section; CHOLECYSTECTOMY LAPAROSCOPIC (N/A, 2017); Cholecystectomy; ERCP (N/A, 2017); and Dental surgery  ,  family history includes Cancer in her father; Diabetes in her father; Hodgkin's lymphoma in her mother; Hypertension in her father  ,   reports that she has been smoking cigarettes  She has been smoking about 0 20 packs per day  She has never used smokeless tobacco  She reports that she drinks alcohol  She reports that she does not use drugs  ,  is allergic to bactrim [sulfamethoxazole-trimethoprim]; ciprofloxacin; erythromycin; erythromycin base; gluten meal; lactose; levaquin [levofloxacin]; penicillins; tetracycline; valtrex [valacyclovir]; and keflex [cephalexin]     Current Outpatient Medications   Medication Sig Dispense Refill    celecoxib (CeleBREX) 100 mg capsule celecoxib 100 mg capsule      fluticasone (FLONASE) 50 mcg/act nasal spray 2 sprays into each nostril daily for 30 days 1 Bottle 1    hyoscyamine (LEVSIN) 0 125 MG tablet Place under the tongue      ibuprofen (MOTRIN) 800 mg tablet Take 1 tablet (800 mg total) by mouth every 8 (eight) hours as needed for mild pain or moderate pain for up to 30 days 90 tablet 0    nystatin (MYCOSTATIN) 500,000 units/5 mL suspension Apply 5 mL (500,000 Units total) to the mouth or throat 4 (four) times a day for 10 days 200 mL 1     No current facility-administered medications for this visit  Review of Systems   Constitutional: Negative for fatigue  HENT:        Has occasional white coating on tongue   Respiratory: Negative for cough  Cardiovascular: Negative for chest pain and leg swelling  Musculoskeletal: Negative for back pain, gait problem and neck pain  Left shoulder pain   Skin: Negative for pallor and rash  Allergic/Immunologic: Negative for immunocompromised state  Neurological: Negative for weakness, numbness and headaches  Hematological: Negative for adenopathy  Psychiatric/Behavioral: Negative for confusion  All other systems reviewed and are negative  Objective:  Vitals:    07/30/19 1242   BP: 144/68   BP Location: Right arm   Patient Position: Sitting   Pulse: 100   Resp: 18   SpO2: 98%   Weight: 63 5 kg (140 lb)   Height: 5' 4" (1 626 m)     Body mass index is 24 03 kg/m²  Physical Exam   Constitutional: She is oriented to person, place, and time  She appears well-developed and well-nourished  No distress  HENT:   Head: Normocephalic and atraumatic  Mouth/Throat: Oropharynx is clear and moist  No oropharyngeal exudate  Eyes: Pupils are equal, round, and reactive to light  Conjunctivae and EOM are normal    Neck: Normal range of motion  Neck supple  Cardiovascular: Normal rate, regular rhythm and normal heart sounds  Exam reveals no gallop and no friction rub  No murmur heard  Pulmonary/Chest: Effort normal    Musculoskeletal: She exhibits tenderness     Left medial shoulder tenderness to palpation  Limited anterior raise due to pain  Lymphadenopathy:     She has no cervical adenopathy  Neurological: She is alert and oriented to person, place, and time  She has normal reflexes  She displays normal reflexes  No sensory deficit  She exhibits normal muscle tone  Skin: Skin is warm and dry  Capillary refill takes less than 2 seconds  Rash noted  She is not diaphoretic  Psychiatric: She has a normal mood and affect  Her behavior is normal  Judgment and thought content normal    Nursing note and vitals reviewed

## 2019-08-01 DIAGNOSIS — M77.8 TENDONITIS OF SHOULDER, RIGHT: ICD-10-CM

## 2019-08-01 RX ORDER — IBUPROFEN 800 MG/1
800 TABLET ORAL EVERY 8 HOURS PRN
Qty: 90 TABLET | Refills: 0 | Status: SHIPPED | OUTPATIENT
Start: 2019-08-01 | End: 2019-10-19 | Stop reason: SDUPTHER

## 2019-08-26 DIAGNOSIS — J06.9 UPPER RESPIRATORY TRACT INFECTION, UNSPECIFIED TYPE: ICD-10-CM

## 2019-08-26 RX ORDER — CEFDINIR 300 MG/1
300 CAPSULE ORAL EVERY 12 HOURS SCHEDULED
Qty: 14 CAPSULE | Refills: 0 | Status: SHIPPED | OUTPATIENT
Start: 2019-08-26 | End: 2019-09-02

## 2019-10-19 DIAGNOSIS — M77.8 TENDONITIS OF SHOULDER, RIGHT: ICD-10-CM

## 2019-10-19 DIAGNOSIS — M62.830 BACK MUSCLE SPASM: Primary | ICD-10-CM

## 2019-10-19 RX ORDER — IBUPROFEN 800 MG/1
800 TABLET ORAL EVERY 8 HOURS PRN
Qty: 90 TABLET | Refills: 0 | Status: SHIPPED | OUTPATIENT
Start: 2019-10-19 | End: 2020-02-11 | Stop reason: SDUPTHER

## 2019-10-19 RX ORDER — CYCLOBENZAPRINE HCL 10 MG
10 TABLET ORAL 3 TIMES DAILY PRN
Qty: 15 TABLET | Refills: 0 | Status: SHIPPED | OUTPATIENT
Start: 2019-10-19 | End: 2020-11-11 | Stop reason: SDUPTHER

## 2020-02-11 ENCOUNTER — OFFICE VISIT (OUTPATIENT)
Dept: FAMILY MEDICINE CLINIC | Facility: CLINIC | Age: 55
End: 2020-02-11
Payer: COMMERCIAL

## 2020-02-11 VITALS
SYSTOLIC BLOOD PRESSURE: 138 MMHG | HEART RATE: 82 BPM | BODY MASS INDEX: 25.44 KG/M2 | DIASTOLIC BLOOD PRESSURE: 68 MMHG | HEIGHT: 64 IN | TEMPERATURE: 100.5 F | WEIGHT: 149 LBS | RESPIRATION RATE: 18 BRPM | OXYGEN SATURATION: 97 %

## 2020-02-11 DIAGNOSIS — Z11.59 NEED FOR HEPATITIS C SCREENING TEST: ICD-10-CM

## 2020-02-11 DIAGNOSIS — R39.9 UTI SYMPTOMS: Primary | ICD-10-CM

## 2020-02-11 DIAGNOSIS — R73.9 HYPERGLYCEMIA: ICD-10-CM

## 2020-02-11 DIAGNOSIS — M77.8 TENDONITIS OF SHOULDER, RIGHT: ICD-10-CM

## 2020-02-11 DIAGNOSIS — Z87.442 HISTORY OF KIDNEY STONES: ICD-10-CM

## 2020-02-11 DIAGNOSIS — Z13.220 LIPID SCREENING: ICD-10-CM

## 2020-02-11 LAB
SL AMB  POCT GLUCOSE, UA: NEGATIVE
SL AMB LEUKOCYTE ESTERASE,UA: NEGATIVE
SL AMB POCT BILIRUBIN,UA: NEGATIVE
SL AMB POCT BLOOD,UA: NEGATIVE
SL AMB POCT CLARITY,UA: CLEAR
SL AMB POCT COLOR,UA: YELLOW
SL AMB POCT KETONES,UA: NEGATIVE
SL AMB POCT NITRITE,UA: NEGATIVE
SL AMB POCT PH,UA: 6.5
SL AMB POCT SPECIFIC GRAVITY,UA: 1.02
SL AMB POCT URINE PROTEIN: NEGATIVE
SL AMB POCT UROBILINOGEN: 0.2

## 2020-02-11 PROCEDURE — 87086 URINE CULTURE/COLONY COUNT: CPT | Performed by: NURSE PRACTITIONER

## 2020-02-11 PROCEDURE — 99214 OFFICE O/P EST MOD 30 MIN: CPT | Performed by: NURSE PRACTITIONER

## 2020-02-11 PROCEDURE — 87077 CULTURE AEROBIC IDENTIFY: CPT | Performed by: NURSE PRACTITIONER

## 2020-02-11 PROCEDURE — 81003 URINALYSIS AUTO W/O SCOPE: CPT | Performed by: NURSE PRACTITIONER

## 2020-02-11 PROCEDURE — 3008F BODY MASS INDEX DOCD: CPT | Performed by: NURSE PRACTITIONER

## 2020-02-11 PROCEDURE — 87186 SC STD MICRODIL/AGAR DIL: CPT | Performed by: NURSE PRACTITIONER

## 2020-02-11 PROCEDURE — 4004F PT TOBACCO SCREEN RCVD TLK: CPT | Performed by: NURSE PRACTITIONER

## 2020-02-11 RX ORDER — IBUPROFEN 800 MG/1
800 TABLET ORAL EVERY 8 HOURS PRN
Qty: 90 TABLET | Refills: 0 | Status: SHIPPED | OUTPATIENT
Start: 2020-02-11 | End: 2020-03-09 | Stop reason: ALTCHOICE

## 2020-02-11 RX ORDER — NITROFURANTOIN 25; 75 MG/1; MG/1
100 CAPSULE ORAL 2 TIMES DAILY
Qty: 10 CAPSULE | Refills: 0 | Status: SHIPPED | OUTPATIENT
Start: 2020-02-11 | End: 2020-02-16

## 2020-02-11 NOTE — PROGRESS NOTES
Assessment/Plan:       Diagnoses and all orders for this visit:    UTI symptoms  -     POCT urine dip auto non-scope  -     US kidney and bladder; Future  -     nitrofurantoin (MACROBID) 100 mg capsule; Take 1 capsule (100 mg total) by mouth 2 (two) times a day for 5 days  -     Urine culture; Future  -     Urine culture    History of kidney stones  -     US kidney and bladder; Future  -     Comprehensive metabolic panel; Future    BMI 25 0-25 9,adult  -     Lipid panel; Future    Hyperglycemia  -     Comprehensive metabolic panel; Future    Need for hepatitis C screening test  -     Hepatitis C antibody; Future    Lipid screening  -     Lipid panel; Future    Tendonitis of shoulder, right  -     ibuprofen (MOTRIN) 800 mg tablet; Take 1 tablet (800 mg total) by mouth every 8 (eight) hours as needed for mild pain or moderate pain        No problem-specific Assessment & Plan notes found for this encounter  Subjective:      Patient ID: Andrea Guo is a 53 y o  female  Patient is here to discuss urinary sym,ptoms  She started with low back pain last week  She then became very thirsty  She has low grade fever  She took OTC urine test--first one was negative and the second one, this am, was positive  She feels like she is not emptying her bladder  She does have h/o kidney stone  Stone was + for calcium  She had that kidney stone when she was eating spinach and so she stopped  She recently started plant based diet  Her  has a special diet for cancer and she started following the same "alkaline type diet"  The following portions of the patient's history were reviewed and updated as appropriate:   She has a past medical history of Choledocholithiasis (2017) and GERD (gastroesophageal reflux disease)  ,  does not have any pertinent problems on file  ,   has a past surgical history that includes  section; CHOLECYSTECTOMY LAPAROSCOPIC (N/A, 2017);  Cholecystectomy; ERCP (N/A, 2017); and Dental surgery  ,  family history includes Cancer in her father; Diabetes in her father; Hodgkin's lymphoma in her mother; Hypertension in her father  ,   reports that she has been smoking cigarettes  She has been smoking about 0 20 packs per day  She has never used smokeless tobacco  She reports that she drinks alcohol  She reports that she does not use drugs  ,  is allergic to bactrim [sulfamethoxazole-trimethoprim]; ciprofloxacin; erythromycin; erythromycin base; gluten meal; lactose; levaquin [levofloxacin]; penicillins; tetracycline; valtrex [valacyclovir]; and keflex [cephalexin]     Current Outpatient Medications   Medication Sig Dispense Refill    celecoxib (CeleBREX) 100 mg capsule celecoxib 100 mg capsule      cyclobenzaprine (FLEXERIL) 10 mg tablet Take 1 tablet (10 mg total) by mouth 3 (three) times a day as needed for muscle spasms for up to 5 days 15 tablet 0    fluticasone (FLONASE) 50 mcg/act nasal spray 2 sprays into each nostril daily for 30 days 1 Bottle 1    hyoscyamine (LEVSIN) 0 125 MG tablet Place under the tongue      ibuprofen (MOTRIN) 800 mg tablet Take 1 tablet (800 mg total) by mouth every 8 (eight) hours as needed for mild pain or moderate pain 90 tablet 0    nitrofurantoin (MACROBID) 100 mg capsule Take 1 capsule (100 mg total) by mouth 2 (two) times a day for 5 days 10 capsule 0     No current facility-administered medications for this visit  Review of Systems   Constitutional: Negative for fatigue and fever  HENT: Negative  Respiratory: Negative  Cardiovascular: Negative  Gastrointestinal: Negative  Endocrine: Negative for polydipsia, polyphagia and polyuria  Genitourinary: Positive for difficulty urinating, dysuria, flank pain, frequency and urgency  Negative for dyspareunia and hematuria  Musculoskeletal: Positive for back pain  Allergic/Immunologic: Negative for immunocompromised state  Neurological: Negative for dizziness     Hematological: Negative  All other systems reviewed and are negative  Objective:  Vitals:    02/11/20 1632   BP: 138/68   BP Location: Left arm   Patient Position: Sitting   Pulse: 82   Resp: 18   Temp: 100 5 °F (38 1 °C)   SpO2: 97%   Weight: 67 6 kg (149 lb)   Height: 5' 4" (1 626 m)     Body mass index is 25 58 kg/m²  Physical Exam   Constitutional: She is oriented to person, place, and time  She appears well-developed and well-nourished  No distress  HENT:   Head: Normocephalic and atraumatic  Nose: Nose normal    Mouth/Throat: No oropharyngeal exudate  Eyes: Pupils are equal, round, and reactive to light  Conjunctivae are normal    Neck: Normal range of motion  Neck supple  No thyromegaly present  Cardiovascular: Normal rate  Exam reveals no gallop and no friction rub  No murmur heard  Pulmonary/Chest: Effort normal  No respiratory distress  She exhibits no tenderness  Abdominal: Soft  She exhibits no distension  There is no tenderness  Musculoskeletal: Normal range of motion  She exhibits no edema, tenderness or deformity  Lymphadenopathy:     She has no cervical adenopathy  Neurological: She is alert and oriented to person, place, and time  No sensory deficit  Skin: Skin is warm and dry  Capillary refill takes less than 2 seconds  Psychiatric: She has a normal mood and affect  Her behavior is normal  Judgment and thought content normal        BMI Counseling: Body mass index is 25 58 kg/m²  The BMI is above normal  Nutrition recommendations include reducing portion sizes, decreasing overall calorie intake, 3-5 servings of fruits/vegetables daily, reducing fast food intake, consuming healthier snacks, decreasing soda and/or juice intake, moderation in carbohydrate intake, increasing intake of lean protein, reducing intake of saturated fat and trans fat and reducing intake of cholesterol  Exercise recommendations include exercising 3-5 times per week and strength training exercises     BMI Counseling: Body mass index is 25 58 kg/m²  The BMI is above normal  Nutrition recommendations include decreasing portion sizes, encouraging healthy choices of fruits and vegetables, decreasing fast food intake, consuming healthier snacks, limiting drinks that contain sugar, moderation in carbohydrate intake, increasing intake of lean protein, reducing intake of saturated and trans fat and reducing intake of cholesterol  Exercise recommendations include exercising 3-5 times per week and strength training exercises  No pharmacotherapy was ordered

## 2020-02-11 NOTE — PATIENT INSTRUCTIONS
Here for symptoms of UTI  Fever is present  Urine in office is clean  Start antibiotic  If no imprvmnt in symptoms after 3 days , obtain ultrasound  Has not had labs for a while  Will check lipids, glucose  She had mammogram in 2019  Colonoscopy is scheduled for this year    Weight Management   AMBULATORY CARE:   Why it is important to manage your weight:  Being overweight increases your risk of health conditions such as heart disease, high blood pressure, type 2 diabetes, and certain types of cancer  It can also increase your risk for osteoarthritis, sleep apnea, and other respiratory problems  Aim for a slow, steady weight loss  Even a small amount of weight loss can lower your risk of health problems  How to lose weight safely:  A safe and healthy way to lose weight is to eat fewer calories and get regular exercise  You can lose up about 1 pound a week by decreasing the number of calories you eat by 500 calories each day  You can decrease calories by eating smaller portion sizes or by cutting out high-calorie foods  Read labels to find out how many calories are in the foods you eat  You can also burn calories with exercise such as walking, swimming, or biking  You will be more likely to keep weight off if you make these changes part of your lifestyle  Healthy meal plan for weight management:  A healthy meal plan includes a variety of foods, contains fewer calories, and helps you stay healthy  A healthy meal plan includes the following:  · Eat whole-grain foods more often  A healthy meal plan should contain fiber  Fiber is the part of grains, fruits, and vegetables that is not broken down by your body  Whole-grain foods are healthy and provide extra fiber in your diet  Some examples of whole-grain foods are whole-wheat breads and pastas, oatmeal, brown rice, and bulgur  · Eat a variety of vegetables every day  Include dark, leafy greens such as spinach, kale, shun greens, and mustard greens   Eat yellow and orange vegetables such as carrots, sweet potatoes, and winter squash  · Eat a variety of fruits every day  Choose fresh or canned fruit (canned in its own juice or light syrup) instead of juice  Fruit juice has very little or no fiber  · Eat low-fat dairy foods  Drink fat-free (skim) milk or 1% milk  Eat fat-free yogurt and low-fat cottage cheese  Try low-fat cheeses such as mozzarella and other reduced-fat cheeses  · Choose meat and other protein foods that are low in fat  Choose beans or other legumes such as split peas or lentils  Choose fish, skinless poultry (chicken or turkey), or lean cuts of red meat (beef or pork)  Before you cook meat or poultry, cut off any visible fat  · Use less fat and oil  Try baking foods instead of frying them  Add less fat, such as margarine, sour cream, regular salad dressing and mayonnaise to foods  Eat fewer high-fat foods  Some examples of high-fat foods include french fries, doughnuts, ice cream, and cakes  · Eat fewer sweets  Limit foods and drinks that are high in sugar  This includes candy, cookies, regular soda, and sweetened drinks  Ways to decrease calories:   · Eat smaller portions  ¨ Use a small plate with smaller servings  ¨ Do not eat second helpings  ¨ When you eat at a restaurant, ask for a box and place half of your meal in the box before you eat  ¨ Share an entrée with someone else  · Replace high-calorie snacks with healthy, low-calorie snacks  ¨ Choose fresh fruit, vegetables, fat-free rice cakes, or air-popped popcorn instead of potato chips, nuts, or chocolate  ¨ Choose water or calorie-free drinks instead of soda or sweetened drinks  · Eat regular meals  Skipping meals can lead to overeating later in the day  Eat a healthy snack in place of a meal if you do not have time to eat a regular meal      · Do not shop for groceries when you are hungry  You may be more likely to make unhealthy food choices   Take a grocery list of healthy foods and shop after you have eaten  Exercise:  Exercise at least 30 minutes per day on most days of the week  Some examples of exercise include walking, biking, dancing, and swimming  You can also fit in more physical activity by taking the stairs instead of the elevator or parking farther away from stores  Ask your healthcare provider about the best exercise plan for you  Other things to consider as you try to lose weight:   · Be aware of situations that may give you the urge to overeat, such as eating while watching television  Find ways to avoid these situations  For example, read a book, go for a walk, or do crafts  · Meet with a weight loss support group or friends who are also trying to lose weight  This may help you stay motivated to continue working on your weight loss goals  © 2017 [address] [employer] Information is for End User's use only and may not be sold, redistributed or otherwise used for commercial purposes  All illustrations and images included in CareNotes® are the copyrighted property of [employer]  or Dmitriy Miranda  The above information is an  only  It is not intended as medical advice for individual conditions or treatments  Talk to your doctor, nurse or pharmacist before following any medical regimen to see if it is safe and effective for you  Low Fat Diet   AMBULATORY CARE:   A low-fat diet  is an eating plan that is low in total fat, unhealthy fat, and cholesterol  You may need to follow a low-fat diet if you have trouble digesting or absorbing fat  You may also need to follow this diet if you have high cholesterol  You can also lower your cholesterol by increasing the amount of fiber in your diet  Soluble fiber is a type of fiber that helps to decrease cholesterol levels  Different types of fat in food:   · Limit unhealthy fats    A diet that is high in cholesterol, saturated fat, and trans fat may cause unhealthy cholesterol levels  Unhealthy cholesterol levels increase your risk of heart disease  ¨ Cholesterol:  Limit intake of cholesterol to less than 200 mg per day  Cholesterol is found in meat, eggs, and dairy  ¨ Saturated fat:  Limit saturated fat to less than 7% of your total daily calories  Ask your dietitian how many calories you need each day  Saturated fat is found in butter, cheese, ice cream, whole milk, and palm oil  Saturated fat is also found in meat, such as beef, pork, chicken skin, and processed meats  Processed meats include sausage, hot dogs, and bologna  ¨ Trans fat:  Avoid trans fat as much as possible  Trans fat is used in fried and baked foods  Foods that say trans fat free on the label may still have up to 0 5 grams of trans fat per serving  · Include healthy fats  Replace foods that are high in saturated and trans fat with foods high in healthy fats  This may help to decrease high cholesterol levels  ¨ Monounsaturated fats: These are found in avocados, nuts, and vegetable oils, such as olive, canola, and sunflower oil  ¨ Polyunsaturated fats: These can be found in vegetable oils, such as soybean or corn oil  Omega-3 fats can help to decrease the risk of heart disease  Omega-3 fats are found in fish, such as salmon, herring, trout, and tuna  Omega-3 fats can also be found in plant foods, such as walnuts, flaxseed, soybeans, and canola oil    Foods to limit or avoid:   · Grains:      ¨ Snacks that are made with partially hydrogenated oils, such as chips, regular crackers, and butter-flavored popcorn    ¨ High-fat baked goods, such as biscuits, croissants, doughnuts, pies, cookies, and pastries    · Dairy:      ¨ Whole milk, 2% milk, and yogurt and ice cream made with whole milk    ¨ Half and half creamer, heavy cream, and whipping cream    ¨ Cheese, cream cheese, and sour cream    · Meats and proteins:      ¨ High-fat cuts of meat (T-bone steak, regular hamburger, and ribs)    ¨ Fried meat, poultry (turkey and chicken), and fish    ¨ Poultry (chicken and turkey) with skin    ¨ Cold cuts (salami or bologna), hot dogs, grant, and sausage    ¨ Whole eggs and egg yolks    · Vegetables and fruits with added fat:      ¨ Fried vegetables or vegetables in butter or high-fat sauces, such as cream or cheese sauces    ¨ Fried fruit or fruit served with butter or cream    · Fats:      ¨ Butter, stick margarine, and shortening    ¨ Coconut, palm oil, and palm kernel oil  Foods to include:   · Grains:      ¨ Whole-grain breads, cereals, pasta, and brown rice    ¨ Low-fat crackers and pretzels    · Vegetables and fruits:      ¨ Fresh, frozen, or canned vegetables (no salt or low-sodium)    ¨ Fresh, frozen, dried, or canned fruit (canned in light syrup or fruit juice)    ¨ Avocado    · Low-fat dairy products:      ¨ Nonfat (skim) or 1% milk    ¨ Nonfat or low-fat cheese, yogurt, and cottage cheese    · Meats and proteins:      ¨ Chicken or turkey with no skin    ¨ Baked or broiled fish    ¨ Lean beef and pork (loin, round, extra lean hamburger)    ¨ Beans and peas, unsalted nuts, soy products    ¨ Egg whites and substitutes    ¨ Seeds and nuts    · Fats:      ¨ Unsaturated oil, such as canola, olive, peanut, soybean, or sunflower oil    ¨ Soft or liquid margarine and vegetable oil spread    ¨ Low-fat salad dressing  Other ways to decrease fat:   · Read food labels before you buy foods  Choose foods that have less than 30% of calories from fat  Choose low-fat or fat-free dairy products  Remember that fat free does not mean calorie free  These foods still contain calories, and too many calories can lead to weight gain  · Trim fat from meat and avoid fried food  Trim all visible fat from meat before you cook it  Remove the skin from poultry  Do not dickson meat, fish, or poultry  Bake, roast, boil, or broil these foods instead  Avoid fried foods  Eat a baked potato instead of Western Sruthi fries   Steam vegetables instead of sautéing them in butter  · Add less fat to foods  Use imitation grant bits on salads and baked potatoes instead of regular grant bits  Use fat-free or low-fat salad dressings instead of regular dressings  Use low-fat or nonfat butter-flavored topping instead of regular butter or margarine on popcorn and other foods  Ways to decrease fat in recipes:  Replace high-fat ingredients with low-fat or nonfat ones  This may cause baked goods to be drier than usual  You may need to use nonfat cooking spray on pans to prevent food from sticking  You also may need to change the amount of other ingredients, such as water, in the recipe  Try the following:  · Use low-fat or light margarine instead of regular margarine or shortening  · Use lean ground turkey breast or chicken, or lean ground beef (less than 5% fat) instead of hamburger  · Add 1 teaspoon of canola oil to 8 ounces of skim milk instead of using cream or half and half  · Use grated zucchini, carrots, or apples in breads instead of coconut  · Use blenderized, low-fat cottage cheese, plain tofu, or low-fat ricotta cheese instead of cream cheese  · Use 1 egg white and 1 teaspoon of canola oil, or use ¼ cup (2 ounces) of fat-free egg substitute instead of a whole egg  · Replace half of the oil that is called for in a recipe with applesauce when you bake  Use 3 tablespoons of cocoa powder and 1 tablespoon of canola oil instead of a square of baking chocolate  How to increase fiber:  Eat enough high-fiber foods to get 20 to 30 grams of fiber every day  Slowly increase your fiber intake to avoid stomach cramps, gas, and other problems  · Eat 3 ounces of whole-grain foods each day  An ounce is about 1 slice of bread  Eat whole-grain breads, such as whole-wheat bread  Whole wheat, whole-wheat flour, or other whole grains should be listed as the first ingredient on the food label   Replace white flour with whole-grain flour or use half of each in recipes  Whole-grain flour is heavier than white flour, so you may have to add more yeast or baking powder  · Eat a high-fiber cereal for breakfast   Oatmeal is a good source of soluble fiber  Look for cereals that have bran or fiber in the name  Choose whole-grain products, such as brown rice, barley, and whole-wheat pasta  · Eat more beans, peas, and lentils  For example, add beans to soups or salads  Eat at least 5 cups of fruits and vegetables each day  Eat fruits and vegetables with the peel because the peel is high in fiber  © 2017 [facility] Information is for End User's use only and may not be sold, redistributed or otherwise used for commercial purposes  All illustrations and images included in CareNotes® are the copyrighted property of [employer]  or Dmitriy Miranda  The above information is an  only  It is not intended as medical advice for individual conditions or treatments  Talk to your doctor, nurse or pharmacist before following any medical regimen to see if it is safe and effective for you  Calorie Counting Diet   WHAT YOU NEED TO KNOW:   What is a calorie counting diet? It is a meal plan based on counting calories each day to reach a healthy body weight  You will need to eat fewer calories if you are trying to lose weight  Weight loss may decrease your risk for certain health problems or improve your health if you have health problems  Some of these health problems include heart disease, high blood pressure, and diabetes  What foods should I avoid? Your dietitian will tell you if you need to avoid certain foods based on your body weight and health condition  You may need to avoid high-fat foods if you are at risk for or have heart disease  You may need to eat fewer foods from the breads and starches food group if you have diabetes  How many calories are in foods?   The following is a list of foods and drinks with the approximate number of calories in each  Check the food label to find the exact number of calories  A dietitian can tell you how many calories you should have from each food group each day    · Carbohydrate:      ¨ ½ of a 3-inch bagel, 1 slice of bread, or ½ of a hamburger bun or hot dog bun (80)    ¨ 1 (8-inch) flour tortilla or ½ cup of cooked rice (100)    ¨ 1 (6-inch) corn tortilla (80)    ¨ 1 (6-inch) pancake or 1 cup of bran flakes cereal (110)    ¨ ½ cup of cooked cereal (80)    ¨ ½ cup of cooked pasta (85)    ¨ 1 ounce of pretzels (100)    ¨ 3 cups of air-popped popcorn without butter or oil (80)    · Dairy:      ¨ 1 cup of skim or 1% milk (90)    ¨ 1 cup of 2% milk (120)    ¨ 1 cup of whole milk (160)    ¨ 1 cup of 2% chocolate milk (220)    ¨ 1 ounce of low-fat cheese with 3 grams of fat per ounce (70)    ¨ 1 ounce of cheddar cheese (114)    ¨ ½ cup of 1% fat cottage cheese (80)    ¨ 1 cup of plain or sugar-free, fat-free yogurt (90)    · Protein foods:      ¨ 3 ounces of fish (not breaded or fried) (95)    ¨ 3 ounces of breaded, fried fish (195)    ¨ ¾ cup of tuna canned in water (105)    ¨ 3 ounces of chicken breast without skin (105)    ¨ 1 fried chicken breast with skin (350)    ¨ ¼ cup of fat free egg substitute (40)    ¨ 1 large egg (75)    ¨ 3 ounces of lean beef or pork (165)    ¨ 3 ounces of fried pork chop or ham (185)    ¨ ½ cup of cooked dried beans, such as kidney, perry, lentils, or navy (115)    ¨ 3 ounces of bologna or lunch meat (225)    ¨ 2 links of breakfast sausage (140)    · Vegetables:      ¨ ½ cup of sliced mushrooms (10)    ¨ 1 cup of salad greens, such as lettuce, spinach, or zachary (15)    ¨ ½ cup of steamed asparagus (20)    ¨ ½ cup of cooked summer squash, zucchini squash, or green or wax beans (25)    ¨ 1 cup of broccoli or cauliflower florets, or 1 medium tomato (25)    ¨ 1 large raw carrot or ½ cup of cooked carrots (40)    ¨ ? of a medium cucumber or 1 stalk of celery (5)    ¨ 1 small baked potato (160)    ¨ 1 cup of breaded, fried vegetables (230)    · Fruit:      ¨ 1 (6-inch) banana (55)     ¨ ½ of a 4-inch grapefruit (55)    ¨ 15 grapes (60)    ¨ 1 medium orange or apple (70)    ¨ 1 large peach (65)    ¨ 1 cup of fresh pineapple chunks (75)    ¨ 1 cup of melon cubes (50)    ¨ 1¼ cups of whole strawberries (45)    ¨ ½ cup of fruit canned in juice (55)    ¨ ½ cup of fruit canned in heavy syrup (110)    ¨ ?  cup of raisins (130)    ¨ ½ cup of unsweetened fruit juice (60)    ¨ ½ cup of grape, cranberry, or prune juice (90)    · Fat:      ¨ 10 peanuts or 2 teaspoons of peanut butter (55)    ¨ 2 tablespoons of avocado or 1 tablespoon of regular salad dressing (45)    ¨ 2 slices of grant (90)    ¨ 1 teaspoon of oil, such as safflower, canola, corn, or olive oil (45)    ¨ 2 teaspoons of low-fat margarine, or 1 tablespoon of low-fat mayonnaise (50)    ¨ 1 teaspoon of regular margarine (40)    ¨ 1 tablespoon of regular mayonnaise (135)    ¨ 1 tablespoon of cream cheese or 2 tablespoons of low-fat cream cheese (45)    ¨ 2 tablespoons of vegetable shortening (215)    · Dessert and sweets:      ¨ 8 animal crackers or 5 vanilla wafers (80)    ¨ 1 frozen fruit juice bar (80)    ¨ ½ cup of ice milk or low-fat frozen yogurt (90)    ¨ ½ cup of sherbet or sorbet (125)    ¨ ½ cup of sugar-free pudding or custard (60)    ¨ ½ cup of ice cream (140)    ¨ ½ cup of pudding or custard (175)    ¨ 1 (2-inch) square chocolate brownie (185)    · Combination foods:      ¨ Bean burrito made with an 8-inch tortilla, without cheese (275)    ¨ Chicken breast sandwich with lettuce and tomato (325)    ¨ 1 cup of chicken noodle soup (60)    ¨ 1 beef taco (175)    ¨ Regular hamburger with lettuce and tomato (310)    ¨ Regular cheeseburger with lettuce and tomato (410)     ¨ ¼ of a 12-inch cheese pizza (280)    ¨ Fried fish sandwich with lettuce and tomato (425)    ¨ Hot dog and bun (275)    ¨ 1½ cups of macaroni and cheese (310)    ¨ Taco salad with a fried tortilla shell (870)    · Low-calorie foods:      ¨ 1 tablespoon of ketchup or 1 tablespoon of fat free sour cream (15)    ¨ 1 teaspoon of mustard (5)    ¨ ¼ cup of salsa (20)    ¨ 1 large dill pickle (15)    ¨ 1 tablespoon of fat free salad dressing (10)    ¨ 2 teaspoons of low-sugar, light jam or jelly, or 1 tablespoon of sugar-free syrup (15)    ¨ 1 sugar-free popsicle (15)    ¨ 1 cup of club soda, seltzer water, or diet soda (0)  CARE AGREEMENT:   You have the right to help plan your care  Discuss treatment options with your caregivers to decide what care you want to receive  You always have the right to refuse treatment  The above information is an  only  It is not intended as medical advice for individual conditions or treatments  Talk to your doctor, nurse or pharmacist before following any medical regimen to see if it is safe and effective for you  © 2017 [facility] [employer] Information is for End User's use only and may not be sold, redistributed or otherwise used for commercial purposes  All illustrations and images included in CareNotes® are the copyrighted property of [employer]  or Dmitriy Miranda

## 2020-02-12 ENCOUNTER — TELEPHONE (OUTPATIENT)
Dept: ADMINISTRATIVE | Facility: OTHER | Age: 55
End: 2020-02-12

## 2020-02-12 NOTE — TELEPHONE ENCOUNTER
----- Message from Linda Langford sent at 2/11/2020  6:02 PM EST -----  Regarding: mammo  02/11/20 6:02 PM    Hello, our patient Wilfredo Osuna has had Mammogram completed/performed  Please assist in updating the patient chart by pulling a previous Electronic Medical Record (EMR) document  The previous EMR is care everywher  The date of service is 08/2019      Thank you,  Linda GALAN

## 2020-02-12 NOTE — TELEPHONE ENCOUNTER
Upon review of the In Basket request we were able to note that no further action is required  The patient chart is up to date as a result of a previous request       Any additional questions or concerns should be emailed to the Practice Liaisons via [email or web address] email, please do not reply via In Basket      Thank you  Sarah Alfaro

## 2020-02-14 LAB — BACTERIA UR CULT: ABNORMAL

## 2020-03-09 ENCOUNTER — TRANSCRIBE ORDERS (OUTPATIENT)
Dept: ADMINISTRATIVE | Facility: HOSPITAL | Age: 55
End: 2020-03-09

## 2020-03-09 ENCOUNTER — LAB (OUTPATIENT)
Dept: LAB | Facility: CLINIC | Age: 55
End: 2020-03-09
Payer: COMMERCIAL

## 2020-03-09 ENCOUNTER — OFFICE VISIT (OUTPATIENT)
Dept: FAMILY MEDICINE CLINIC | Facility: CLINIC | Age: 55
End: 2020-03-09
Payer: COMMERCIAL

## 2020-03-09 VITALS
TEMPERATURE: 99.7 F | DIASTOLIC BLOOD PRESSURE: 82 MMHG | HEIGHT: 64 IN | WEIGHT: 147.2 LBS | RESPIRATION RATE: 16 BRPM | OXYGEN SATURATION: 99 % | SYSTOLIC BLOOD PRESSURE: 140 MMHG | BODY MASS INDEX: 25.13 KG/M2 | HEART RATE: 70 BPM

## 2020-03-09 DIAGNOSIS — M75.21 BICEPS TENDINITIS OF RIGHT UPPER EXTREMITY: Primary | ICD-10-CM

## 2020-03-09 DIAGNOSIS — Z87.442 HISTORY OF KIDNEY STONES: ICD-10-CM

## 2020-03-09 DIAGNOSIS — M84.375A STRESS FRACTURE OF LEFT FOOT, INITIAL ENCOUNTER: Primary | ICD-10-CM

## 2020-03-09 DIAGNOSIS — Z13.220 LIPID SCREENING: ICD-10-CM

## 2020-03-09 DIAGNOSIS — Z11.59 NEED FOR HEPATITIS C SCREENING TEST: ICD-10-CM

## 2020-03-09 DIAGNOSIS — R73.9 HYPERGLYCEMIA: ICD-10-CM

## 2020-03-09 LAB
ALBUMIN SERPL BCP-MCNC: 3.9 G/DL (ref 3.5–5)
ALP SERPL-CCNC: 90 U/L (ref 46–116)
ALT SERPL W P-5'-P-CCNC: 21 U/L (ref 12–78)
ANION GAP SERPL CALCULATED.3IONS-SCNC: 4 MMOL/L (ref 4–13)
AST SERPL W P-5'-P-CCNC: 18 U/L (ref 5–45)
BILIRUB SERPL-MCNC: 0.49 MG/DL (ref 0.2–1)
BUN SERPL-MCNC: 21 MG/DL (ref 5–25)
CALCIUM SERPL-MCNC: 9.7 MG/DL (ref 8.3–10.1)
CHLORIDE SERPL-SCNC: 106 MMOL/L (ref 100–108)
CHOLEST SERPL-MCNC: 178 MG/DL (ref 50–200)
CO2 SERPL-SCNC: 27 MMOL/L (ref 21–32)
CREAT SERPL-MCNC: 0.8 MG/DL (ref 0.6–1.3)
GFR SERPL CREATININE-BSD FRML MDRD: 83 ML/MIN/1.73SQ M
GLUCOSE P FAST SERPL-MCNC: 89 MG/DL (ref 65–99)
HCV AB SER QL: NORMAL
HDLC SERPL-MCNC: 73 MG/DL
LDLC SERPL CALC-MCNC: 92 MG/DL (ref 0–100)
NONHDLC SERPL-MCNC: 105 MG/DL
POTASSIUM SERPL-SCNC: 4.1 MMOL/L (ref 3.5–5.3)
PROT SERPL-MCNC: 8.2 G/DL (ref 6.4–8.2)
SODIUM SERPL-SCNC: 137 MMOL/L (ref 136–145)
TRIGL SERPL-MCNC: 65 MG/DL

## 2020-03-09 PROCEDURE — 36415 COLL VENOUS BLD VENIPUNCTURE: CPT

## 2020-03-09 PROCEDURE — 80061 LIPID PANEL: CPT

## 2020-03-09 PROCEDURE — 80053 COMPREHEN METABOLIC PANEL: CPT

## 2020-03-09 PROCEDURE — 99213 OFFICE O/P EST LOW 20 MIN: CPT | Performed by: PHYSICIAN ASSISTANT

## 2020-03-09 PROCEDURE — 86803 HEPATITIS C AB TEST: CPT

## 2020-03-09 PROCEDURE — 3008F BODY MASS INDEX DOCD: CPT | Performed by: PHYSICIAN ASSISTANT

## 2020-03-09 PROCEDURE — 4004F PT TOBACCO SCREEN RCVD TLK: CPT | Performed by: PHYSICIAN ASSISTANT

## 2020-03-09 RX ORDER — MELOXICAM 7.5 MG/1
7.5 TABLET ORAL DAILY
Qty: 30 TABLET | Refills: 0 | Status: ON HOLD | OUTPATIENT
Start: 2020-03-09 | End: 2020-04-05 | Stop reason: ALTCHOICE

## 2020-03-09 NOTE — PROGRESS NOTES
Andrea Guo 1965 female MRN: [id number]    Acute Visit        ASSESSMENT/PLAN  Problem List Items Addressed This Visit        Musculoskeletal and Integument    Biceps tendinitis of right upper extremity - Primary     Pain with flexion and rotation as well as tenderness over anterior aspect of R shoulder  Suggestive of biceps tendonopathy  Will stop ibuprofen and trial longer acting mobic 7 5mg once daily  Pt aware to not take other NSAIDs with mobic  Pt would like to hold of on PT at this time as she shares she does not have the time in her schedule  Relevant Medications    meloxicam (MOBIC) 7 5 mg tablet                No future appointments  SUBJECTIVE  CC: Shoulder Pain (right shoulder pain started friday )       Pt presents with 3 days of R shoulder pain  Hx of R shoulder tendonitis  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  She states she has trouble using R shoulder since Friday due to pain  Pt takes ibuprofen which provides a few hours of relief, however pain quickly returns  Denies ever trying PT for her tendonopathy  Has never received CS injections  No other acute complaints  Andrea Guo is a 53 y o  female who presented for an acute visit complaining of  Review of Systems   Constitutional: Negative for chills, fatigue and fever  HENT: Negative for congestion, ear pain, hearing loss, nosebleeds, postnasal drip, rhinorrhea, sinus pressure, sinus pain, sneezing and sore throat  Eyes: Negative for pain, discharge, itching and visual disturbance  Respiratory: Negative for cough, chest tightness, shortness of breath and wheezing  Cardiovascular: Negative for chest pain, palpitations and leg swelling  Gastrointestinal: Negative for abdominal pain, blood in stool, constipation, diarrhea, nausea and vomiting  Genitourinary: Negative for frequency and urgency  Musculoskeletal: Positive for arthralgias (R shoulder pain)     Neurological: Negative for dizziness, light-headedness and numbness  Historical Information   The patient history was reviewed as follows:  Past Medical History:   Diagnosis Date    Choledocholithiasis 2017    GERD (gastroesophageal reflux disease)      Past Surgical History:   Procedure Laterality Date     SECTION      CHOLECYSTECTOMY      CHOLECYSTECTOMY LAPAROSCOPIC N/A 2017    Procedure: CHOLECYSTECTOMY LAPAROSCOPIC, with IOC, possible open;  Surgeon: Sienna Thacker MD;  Location: [facility];  Service: General    DENTAL SURGERY      ERCP N/A 2017    Procedure: ENDOSCOPIC RETROGRADE CHOLANGIOPANCREATOGRAPHY (ERCP); Surgeon: Savita Aguila MD;  Location: [facility];  Service: Gastroenterology     Family History   Problem Relation Age of Onset    Hodgkin's lymphoma Mother     Cancer Father     Hypertension Father     Diabetes Father         of other type wth microalbuminuria, with long-term current use of insulin      Social History   Social History     Substance and Sexual Activity   Alcohol Use Yes    Comment: occasional glass of wine     Social History     Substance and Sexual Activity   Drug Use No     Social History     Tobacco Use   Smoking Status Current Every Day Smoker    Packs/day: 0 20    Types: Cigarettes   Smokeless Tobacco Never Used       Medications:   Meds/Allergies   Current Outpatient Medications   Medication Sig Dispense Refill    cyclobenzaprine (FLEXERIL) 10 mg tablet Take 1 tablet (10 mg total) by mouth 3 (three) times a day as needed for muscle spasms for up to 5 days 15 tablet 0    fluticasone (FLONASE) 50 mcg/act nasal spray 2 sprays into each nostril daily for 30 days (Patient not taking: Reported on 3/9/2020) 1 Bottle 1    hyoscyamine (LEVSIN) 0 125 MG tablet Place under the tongue      meloxicam (MOBIC) 7 5 mg tablet Take 1 tablet (7 5 mg total) by mouth daily 30 tablet 0     No current facility-administered medications for this visit          Allergies Allergen Reactions    Bactrim [Sulfamethoxazole-Trimethoprim] GI Intolerance    Ciprofloxacin GI Intolerance     Other reaction(s): CIPROFLOXACIN (CIPRO)    Erythromycin GI Intolerance    Erythromycin Base      Other reaction(s): ETHYL ALCOHOL (ERYTHROMYCIN) (SOB)    Gluten Meal     Lactose     Levaquin [Levofloxacin] GI Intolerance    Penicillins     Tetracycline     Valtrex [Valacyclovir] Other (See Comments)     neuropathy    Keflex [Cephalexin] Swelling     Ankle swelling and anxiety       OBJECTIVE  Vitals:   Vitals:    03/09/20 0805   BP: 140/82   BP Location: Left arm   Patient Position: Sitting   Cuff Size: Adult   Pulse: 70   Resp: 16   Temp: 99 7 °F (37 6 °C)   TempSrc: Tympanic   SpO2: 99%   Weight: 66 8 kg (147 lb 3 2 oz)   Height: 5' 4" (1 626 m)       Invasive Devices     None                 Physical Exam   Constitutional: She appears well-developed and well-nourished  HENT:   Head: Normocephalic and atraumatic  Eyes: Pupils are equal, round, and reactive to light  Neck: Normal range of motion  Cardiovascular: Normal rate and regular rhythm  Pulmonary/Chest: Effort normal    Musculoskeletal:        Right shoulder: She exhibits decreased range of motion (pain with cross body movements as well as internal/external rotation), tenderness (tenderness over anterior asepct of shoulder, as well as pain with active flexion) and pain  She exhibits no bony tenderness, no deformity and no laceration  Lab:  I have personally reviewed all pertinent results

## 2020-03-09 NOTE — PATIENT INSTRUCTIONS
Exercises for Internal and External Shoulder Rotation   WHAT YOU NEED TO KNOW:   Exercises for internal shoulder rotation work the muscles in your chest and front of your shoulder  Exercises for external shoulder rotation work the muscles in the back of your shoulder and upper back  DISCHARGE INSTRUCTIONS:   Contact your healthcare provider if:   · You have sharp or worsening pain during exercise or at rest     · You have questions or concerns about your shoulder exercises  Before you exercise:  Warm up and stretch before you exercise  Walk or ride a stationary bike for 5 to 10 minutes to help you warm up  Stretching helps increase range of motion  It may also decrease muscle soreness and help prevent another injury  Your healthcare provider will tell you which of the following stretches to do:  · Crossover arm stretch:  Relax your shoulders  Hold your upper arm with the opposite hand  Pull your arm across your chest until you feel a stretch  Hold the stretch for 30 seconds  Return to the starting position  · Shoulder flexion stretch:  Stand facing a wall  Slowly walk your fingers up the wall until you feel a stretch  Hold the stretch for 30 seconds  Return to the starting position  · Sleeper stretch:  Lie on your injured side on a firm, flat surface  Bend the elbow of your injured arm 90° with your hand facing up  Use your arm that is not injured to slowly push your injured arm down  Stop when you feel a stretch at the back of your injured shoulder  Hold the stretch for 30 seconds  Slowly return to the starting position  How to exercise with a weight:  Your healthcare provider will tell you how much weight to exercise with  · Shoulder internal rotation:  Sit in a chair  Place a rolled up towel between your elbow and your side  Bend your elbow to 90°  Gently squeeze the towel with your elbow to prevent it from falling out  Hold the weight with your thumb pointing up   Slowly move the weight across your chest  Stop when your hand reaches your opposite arm  Hold this position for as many seconds as directed  Slowly return to the starting position  · Shoulder external rotation:  Lie on your side with your injured shoulder facing up  Bend your elbow 90°  Place a rolled up towel between your elbow and your side  Hold a weight in your hand  Gently squeeze the towel with your elbow to prevent it from falling out  Slowly rotate your arm outward, but keep your elbow bent  Stop when you feel a stretch  Hold this position for 30 seconds or as directed  Slowly return to the starting position  How to exercise with an exercise band:   · Shoulder internal rotation:  Tie one end of the exercise band to a heavy, secure object  Sit in a chair  Place a rolled up towel between your elbow and your side  Bend your elbow to 90°  Gently squeeze the towel with your elbow to prevent it from falling out  Slowly pull the band across your chest  Stop when your hand reaches your opposite arm  Hold this position for as many seconds as directed  Slowly return to the starting position  · Shoulder external rotation:  Hold one end of the exercise band on the side that is not injured  Place a rolled up towel between your elbow and your side  Bend your elbow 90°  Squeeze the towel with your elbow  Grab the end of the band and slowly turn your arm outward, but keep your elbow bent  Stop when you feel a stretch  Hold this position for 30 seconds or as directed  Slowly return to the starting position  Follow up with your physical therapist as directed:  Write down your questions so you remember to ask them at your visits  © 2017 [employer] Information is for End User's use only and may not be sold, redistributed or otherwise used for commercial purposes  All illustrations and images included in CareNotes® are the copyrighted property of [employer]  or [employer]    The above information is an  only  It is not intended as medical advice for individual conditions or treatments  Talk to your doctor, nurse or pharmacist before following any medical regimen to see if it is safe and effective for you

## 2020-03-09 NOTE — ASSESSMENT & PLAN NOTE
Pain with flexion and rotation as well as tenderness over anterior aspect of R shoulder  Suggestive of biceps tendonopathy  Will stop ibuprofen and trial longer acting mobic 7 5mg once daily  Pt aware to not take other NSAIDs with mobic  Pt would like to hold of on PT at this time as she shares she does not have the time in her schedule

## 2020-04-05 ENCOUNTER — HOSPITAL ENCOUNTER (EMERGENCY)
Facility: HOSPITAL | Age: 55
End: 2020-04-05
Attending: EMERGENCY MEDICINE | Admitting: EMERGENCY MEDICINE
Payer: COMMERCIAL

## 2020-04-05 ENCOUNTER — HOSPITAL ENCOUNTER (INPATIENT)
Facility: HOSPITAL | Age: 55
LOS: 3 days | Discharge: HOME | DRG: 165 | End: 2020-04-08
Attending: INTERNAL MEDICINE | Admitting: INTERNAL MEDICINE
Payer: COMMERCIAL

## 2020-04-05 ENCOUNTER — APPOINTMENT (EMERGENCY)
Dept: CT IMAGING | Facility: HOSPITAL | Age: 55
End: 2020-04-05
Payer: COMMERCIAL

## 2020-04-05 VITALS
WEIGHT: 149.69 LBS | SYSTOLIC BLOOD PRESSURE: 184 MMHG | BODY MASS INDEX: 25.69 KG/M2 | RESPIRATION RATE: 18 BRPM | OXYGEN SATURATION: 96 % | HEART RATE: 102 BPM | DIASTOLIC BLOOD PRESSURE: 85 MMHG | TEMPERATURE: 97.5 F

## 2020-04-05 DIAGNOSIS — R04.2 HEMOPTYSIS: ICD-10-CM

## 2020-04-05 DIAGNOSIS — Z72.0 TOBACCO USE: ICD-10-CM

## 2020-04-05 DIAGNOSIS — R05.9 COUGH: Primary | ICD-10-CM

## 2020-04-05 DIAGNOSIS — R04.2 HEMOPTYSIS: Primary | ICD-10-CM

## 2020-04-05 DIAGNOSIS — Z20.822 SUSPECTED COVID-19 VIRUS INFECTION: ICD-10-CM

## 2020-04-05 PROBLEM — K44.9 HIATAL HERNIA: Status: ACTIVE | Noted: 2020-04-05

## 2020-04-05 LAB
ABO GROUP BLD: NORMAL
ABO GROUP BLD: NORMAL
ALBUMIN SERPL BCP-MCNC: 3.8 G/DL (ref 3.5–5)
ALP SERPL-CCNC: 84 U/L (ref 46–116)
ALT SERPL W P-5'-P-CCNC: 25 U/L (ref 12–78)
ANION GAP SERPL CALCULATED.3IONS-SCNC: 6 MMOL/L (ref 4–13)
APTT PPP: 27 SECONDS (ref 23–37)
AST SERPL W P-5'-P-CCNC: 21 U/L (ref 5–45)
BASOPHILS # BLD AUTO: 0.04 THOUSANDS/ΜL (ref 0–0.1)
BASOPHILS NFR BLD AUTO: 1 % (ref 0–1)
BILIRUB SERPL-MCNC: 0.3 MG/DL (ref 0.2–1)
BLD GP AB SCN SERPL QL: NEGATIVE
BUN SERPL-MCNC: 17 MG/DL (ref 5–25)
CALCIUM SERPL-MCNC: 9.4 MG/DL (ref 8.3–10.1)
CHLORIDE SERPL-SCNC: 102 MMOL/L (ref 100–108)
CO2 SERPL-SCNC: 30 MMOL/L (ref 21–32)
CREAT SERPL-MCNC: 0.81 MG/DL (ref 0.6–1.3)
EOSINOPHIL # BLD AUTO: 0.05 THOUSAND/ΜL (ref 0–0.61)
EOSINOPHIL NFR BLD AUTO: 1 % (ref 0–6)
ERYTHROCYTE [DISTWIDTH] IN BLOOD BY AUTOMATED COUNT: 14.4 % (ref 11.6–15.1)
FERRITIN SERPL-MCNC: 79 NG/ML (ref 8–388)
GFR SERPL CREATININE-BSD FRML MDRD: 82 ML/MIN/1.73SQ M
GLUCOSE SERPL-MCNC: 117 MG/DL (ref 65–140)
HCT VFR BLD AUTO: 40.6 % (ref 34.8–46.1)
HGB BLD-MCNC: 13.4 G/DL (ref 11.5–15.4)
IMM GRANULOCYTES # BLD AUTO: 0.03 THOUSAND/UL (ref 0–0.2)
IMM GRANULOCYTES NFR BLD AUTO: 0 % (ref 0–2)
INR PPP: 0.99 (ref 0.84–1.19)
LACTATE SERPL-SCNC: 1 MMOL/L (ref 0.5–2)
LDH SERPL-CCNC: 245 U/L (ref 81–234)
LYMPHOCYTES # BLD AUTO: 1.37 THOUSANDS/ΜL (ref 0.6–4.47)
LYMPHOCYTES NFR BLD AUTO: 17 % (ref 14–44)
MCH RBC QN AUTO: 32.5 PG (ref 26.8–34.3)
MCHC RBC AUTO-ENTMCNC: 33 G/DL (ref 31.4–37.4)
MCV RBC AUTO: 99 FL (ref 82–98)
MONOCYTES # BLD AUTO: 0.6 THOUSAND/ΜL (ref 0.17–1.22)
MONOCYTES NFR BLD AUTO: 8 % (ref 4–12)
NEUTROPHILS # BLD AUTO: 5.94 THOUSANDS/ΜL (ref 1.85–7.62)
NEUTS SEG NFR BLD AUTO: 73 % (ref 43–75)
NRBC BLD AUTO-RTO: 0 /100 WBCS
PLATELET # BLD AUTO: 224 THOUSANDS/UL (ref 149–390)
PMV BLD AUTO: 10.3 FL (ref 8.9–12.7)
POTASSIUM SERPL-SCNC: 4.8 MMOL/L (ref 3.5–5.3)
PROCALCITONIN SERPL-MCNC: <0.05 NG/ML
PROT SERPL-MCNC: 8 G/DL (ref 6.4–8.2)
PROTHROMBIN TIME: 13.1 SECONDS (ref 11.6–14.5)
RBC # BLD AUTO: 4.12 MILLION/UL (ref 3.81–5.12)
RH BLD: POSITIVE
RH BLD: POSITIVE
SODIUM SERPL-SCNC: 138 MMOL/L (ref 136–145)
SPECIMEN EXPIRATION DATE: NORMAL
WBC # BLD AUTO: 8.03 THOUSAND/UL (ref 4.31–10.16)

## 2020-04-05 PROCEDURE — 93005 ELECTROCARDIOGRAM TRACING: CPT

## 2020-04-05 PROCEDURE — 87205 SMEAR GRAM STAIN: CPT | Performed by: INTERNAL MEDICINE

## 2020-04-05 PROCEDURE — 85025 COMPLETE CBC W/AUTO DIFF WBC: CPT | Performed by: EMERGENCY MEDICINE

## 2020-04-05 PROCEDURE — 87070 CULTURE OTHR SPECIMN AEROBIC: CPT | Performed by: INTERNAL MEDICINE

## 2020-04-05 PROCEDURE — 86900 BLOOD TYPING SEROLOGIC ABO: CPT | Performed by: EMERGENCY MEDICINE

## 2020-04-05 PROCEDURE — 80053 COMPREHEN METABOLIC PANEL: CPT | Performed by: EMERGENCY MEDICINE

## 2020-04-05 PROCEDURE — 85014 HEMATOCRIT: CPT | Performed by: INTERNAL MEDICINE

## 2020-04-05 PROCEDURE — 83615 LACTATE (LD) (LDH) ENZYME: CPT | Performed by: INTERNAL MEDICINE

## 2020-04-05 PROCEDURE — 94760 N-INVAS EAR/PLS OXIMETRY 1: CPT

## 2020-04-05 PROCEDURE — 86850 RBC ANTIBODY SCREEN: CPT | Performed by: EMERGENCY MEDICINE

## 2020-04-05 PROCEDURE — 82728 ASSAY OF FERRITIN: CPT | Performed by: INTERNAL MEDICINE

## 2020-04-05 PROCEDURE — 83605 ASSAY OF LACTIC ACID: CPT | Performed by: INTERNAL MEDICINE

## 2020-04-05 PROCEDURE — 86901 BLOOD TYPING SEROLOGIC RH(D): CPT | Performed by: EMERGENCY MEDICINE

## 2020-04-05 PROCEDURE — 99285 EMERGENCY DEPT VISIT HI MDM: CPT | Performed by: EMERGENCY MEDICINE

## 2020-04-05 PROCEDURE — 71275 CT ANGIOGRAPHY CHEST: CPT

## 2020-04-05 PROCEDURE — 87040 BLOOD CULTURE FOR BACTERIA: CPT | Performed by: EMERGENCY MEDICINE

## 2020-04-05 PROCEDURE — 99223 1ST HOSP IP/OBS HIGH 75: CPT | Performed by: INTERNAL MEDICINE

## 2020-04-05 PROCEDURE — 85730 THROMBOPLASTIN TIME PARTIAL: CPT | Performed by: EMERGENCY MEDICINE

## 2020-04-05 PROCEDURE — 36415 COLL VENOUS BLD VENIPUNCTURE: CPT | Performed by: EMERGENCY MEDICINE

## 2020-04-05 PROCEDURE — 99285 EMERGENCY DEPT VISIT HI MDM: CPT

## 2020-04-05 PROCEDURE — 85610 PROTHROMBIN TIME: CPT | Performed by: EMERGENCY MEDICINE

## 2020-04-05 PROCEDURE — 85018 HEMOGLOBIN: CPT | Performed by: INTERNAL MEDICINE

## 2020-04-05 PROCEDURE — 84145 PROCALCITONIN (PCT): CPT | Performed by: INTERNAL MEDICINE

## 2020-04-05 PROCEDURE — 87635 SARS-COV-2 COVID-19 AMP PRB: CPT | Performed by: EMERGENCY MEDICINE

## 2020-04-05 RX ORDER — IBUPROFEN 800 MG/1
800 TABLET ORAL EVERY 6 HOURS PRN
COMMUNITY
End: 2020-04-08 | Stop reason: HOSPADM

## 2020-04-05 RX ORDER — AZITHROMYCIN 250 MG/1
500 TABLET, FILM COATED ORAL ONCE
Status: COMPLETED | OUTPATIENT
Start: 2020-04-05 | End: 2020-04-05

## 2020-04-05 RX ORDER — PANTOPRAZOLE SODIUM 40 MG/1
40 INJECTION, POWDER, FOR SOLUTION INTRAVENOUS
Status: DISCONTINUED | OUTPATIENT
Start: 2020-04-06 | End: 2020-04-08

## 2020-04-05 RX ORDER — ACETAMINOPHEN 325 MG/1
650 TABLET ORAL EVERY 6 HOURS PRN
Status: DISCONTINUED | OUTPATIENT
Start: 2020-04-05 | End: 2020-04-08 | Stop reason: HOSPADM

## 2020-04-05 RX ORDER — ONDANSETRON 2 MG/ML
4 INJECTION INTRAMUSCULAR; INTRAVENOUS ONCE
Status: DISCONTINUED | OUTPATIENT
Start: 2020-04-05 | End: 2020-04-05 | Stop reason: HOSPADM

## 2020-04-05 RX ORDER — ALBUTEROL SULFATE 90 UG/1
2 AEROSOL, METERED RESPIRATORY (INHALATION) EVERY 4 HOURS PRN
Status: DISCONTINUED | OUTPATIENT
Start: 2020-04-05 | End: 2020-04-08 | Stop reason: HOSPADM

## 2020-04-05 RX ORDER — METOCLOPRAMIDE HYDROCHLORIDE 5 MG/ML
10 INJECTION INTRAMUSCULAR; INTRAVENOUS EVERY 6 HOURS PRN
Status: DISCONTINUED | OUTPATIENT
Start: 2020-04-05 | End: 2020-04-08 | Stop reason: HOSPADM

## 2020-04-05 RX ADMIN — IOHEXOL 100 ML: 350 INJECTION, SOLUTION INTRAVENOUS at 14:53

## 2020-04-05 RX ADMIN — AZITHROMYCIN 500 MG: 250 TABLET, FILM COATED ORAL at 16:03

## 2020-04-06 ENCOUNTER — APPOINTMENT (INPATIENT)
Dept: RADIOLOGY | Facility: HOSPITAL | Age: 55
DRG: 165 | End: 2020-04-06
Attending: INTERNAL MEDICINE
Payer: COMMERCIAL

## 2020-04-06 PROBLEM — Z72.0 TOBACCO USE: Status: ACTIVE | Noted: 2020-04-06

## 2020-04-06 PROBLEM — Z20.822 SUSPECTED COVID-19 VIRUS INFECTION: Status: RESOLVED | Noted: 2020-04-05 | Resolved: 2020-04-06

## 2020-04-06 LAB
ATRIAL RATE: 93 BPM
BASOPHILS # BLD AUTO: 0.04 THOUSANDS/ΜL (ref 0–0.1)
BASOPHILS NFR BLD AUTO: 1 % (ref 0–1)
EOSINOPHIL # BLD AUTO: 0.06 THOUSAND/ΜL (ref 0–0.61)
EOSINOPHIL NFR BLD AUTO: 1 % (ref 0–6)
ERYTHROCYTE [DISTWIDTH] IN BLOOD BY AUTOMATED COUNT: 14.4 % (ref 11.6–15.1)
HCT VFR BLD AUTO: 37.6 % (ref 34.8–46.1)
HCT VFR BLD AUTO: 39.6 % (ref 34.8–46.1)
HCT VFR BLD AUTO: 39.7 % (ref 34.8–46.1)
HCT VFR BLD AUTO: 40.8 % (ref 34.8–46.1)
HEMOCCULT STL QL: POSITIVE
HGB BLD-MCNC: 12.5 G/DL (ref 11.5–15.4)
HGB BLD-MCNC: 13.2 G/DL (ref 11.5–15.4)
HGB BLD-MCNC: 13.3 G/DL (ref 11.5–15.4)
HGB BLD-MCNC: 13.5 G/DL (ref 11.5–15.4)
IMM GRANULOCYTES # BLD AUTO: 0.02 THOUSAND/UL (ref 0–0.2)
IMM GRANULOCYTES NFR BLD AUTO: 0 % (ref 0–2)
L PNEUMO1 AG UR QL IA.RAPID: NEGATIVE
LYMPHOCYTES # BLD AUTO: 1.42 THOUSANDS/ΜL (ref 0.6–4.47)
LYMPHOCYTES NFR BLD AUTO: 18 % (ref 14–44)
MCH RBC QN AUTO: 32.4 PG (ref 26.8–34.3)
MCHC RBC AUTO-ENTMCNC: 33.6 G/DL (ref 31.4–37.4)
MCV RBC AUTO: 96 FL (ref 82–98)
MONOCYTES # BLD AUTO: 0.58 THOUSAND/ΜL (ref 0.17–1.22)
MONOCYTES NFR BLD AUTO: 8 % (ref 4–12)
NEUTROPHILS # BLD AUTO: 5.6 THOUSANDS/ΜL (ref 1.85–7.62)
NEUTS SEG NFR BLD AUTO: 72 % (ref 43–75)
NRBC BLD AUTO-RTO: 0 /100 WBCS
P AXIS: 81 DEGREES
PLATELET # BLD AUTO: 220 THOUSANDS/UL (ref 149–390)
PMV BLD AUTO: 10.4 FL (ref 8.9–12.7)
PR INTERVAL: 124 MS
QRS AXIS: 58 DEGREES
QRSD INTERVAL: 80 MS
QT INTERVAL: 382 MS
QTC INTERVAL: 474 MS
RBC # BLD AUTO: 4.11 MILLION/UL (ref 3.81–5.12)
S PNEUM AG UR QL: NEGATIVE
SARS-COV-2 RNA RESP QL NAA+PROBE: NEGATIVE
T WAVE AXIS: 41 DEGREES
VENTRICULAR RATE: 93 BPM
WBC # BLD AUTO: 7.72 THOUSAND/UL (ref 4.31–10.16)

## 2020-04-06 PROCEDURE — 99222 1ST HOSP IP/OBS MODERATE 55: CPT | Performed by: INTERNAL MEDICINE

## 2020-04-06 PROCEDURE — 87449 NOS EACH ORGANISM AG IA: CPT | Performed by: INTERNAL MEDICINE

## 2020-04-06 PROCEDURE — 85014 HEMATOCRIT: CPT | Performed by: INTERNAL MEDICINE

## 2020-04-06 PROCEDURE — 82272 OCCULT BLD FECES 1-3 TESTS: CPT | Performed by: PHYSICIAN ASSISTANT

## 2020-04-06 PROCEDURE — 93010 ELECTROCARDIOGRAM REPORT: CPT | Performed by: INTERNAL MEDICINE

## 2020-04-06 PROCEDURE — G0379 DIRECT REFER HOSPITAL OBSERV: HCPCS

## 2020-04-06 PROCEDURE — C9113 INJ PANTOPRAZOLE SODIUM, VIA: HCPCS | Performed by: INTERNAL MEDICINE

## 2020-04-06 PROCEDURE — 99232 SBSQ HOSP IP/OBS MODERATE 35: CPT | Performed by: PHYSICIAN ASSISTANT

## 2020-04-06 PROCEDURE — 85025 COMPLETE CBC W/AUTO DIFF WBC: CPT | Performed by: INTERNAL MEDICINE

## 2020-04-06 PROCEDURE — 85018 HEMOGLOBIN: CPT | Performed by: PHYSICIAN ASSISTANT

## 2020-04-06 PROCEDURE — 85014 HEMATOCRIT: CPT | Performed by: PHYSICIAN ASSISTANT

## 2020-04-06 PROCEDURE — 85018 HEMOGLOBIN: CPT | Performed by: INTERNAL MEDICINE

## 2020-04-06 PROCEDURE — 71045 X-RAY EXAM CHEST 1 VIEW: CPT

## 2020-04-06 RX ORDER — NICOTINE 21 MG/24HR
21 PATCH, TRANSDERMAL 24 HOURS TRANSDERMAL DAILY
Status: DISCONTINUED | OUTPATIENT
Start: 2020-04-06 | End: 2020-04-07

## 2020-04-06 RX ADMIN — NICOTINE 21 MG: 21 PATCH, EXTENDED RELEASE TRANSDERMAL at 12:47

## 2020-04-06 RX ADMIN — PANTOPRAZOLE SODIUM 40 MG: 40 INJECTION, POWDER, FOR SOLUTION INTRAVENOUS at 09:27

## 2020-04-07 ENCOUNTER — PREP FOR PROCEDURE (OUTPATIENT)
Dept: PULMONOLOGY | Facility: CLINIC | Age: 55
End: 2020-04-07

## 2020-04-07 ENCOUNTER — ANESTHESIA (INPATIENT)
Dept: GASTROENTEROLOGY | Facility: HOSPITAL | Age: 55
DRG: 165 | End: 2020-04-07
Payer: COMMERCIAL

## 2020-04-07 ENCOUNTER — APPOINTMENT (INPATIENT)
Dept: GASTROENTEROLOGY | Facility: HOSPITAL | Age: 55
DRG: 165 | End: 2020-04-07
Attending: INTERNAL MEDICINE
Payer: COMMERCIAL

## 2020-04-07 ENCOUNTER — ANESTHESIA EVENT (INPATIENT)
Dept: GASTROENTEROLOGY | Facility: HOSPITAL | Age: 55
DRG: 165 | End: 2020-04-07
Payer: COMMERCIAL

## 2020-04-07 DIAGNOSIS — R04.2 HEMOPTYSIS: Primary | ICD-10-CM

## 2020-04-07 LAB
ANION GAP SERPL CALCULATED.3IONS-SCNC: 7 MMOL/L (ref 4–13)
BASOPHILS # BLD AUTO: 0.06 THOUSANDS/ΜL (ref 0–0.1)
BASOPHILS NFR BLD AUTO: 1 % (ref 0–1)
BUN SERPL-MCNC: 14 MG/DL (ref 5–25)
CALCIUM SERPL-MCNC: 9.4 MG/DL (ref 8.3–10.1)
CHLORIDE SERPL-SCNC: 105 MMOL/L (ref 100–108)
CO2 SERPL-SCNC: 27 MMOL/L (ref 21–32)
CREAT SERPL-MCNC: 0.69 MG/DL (ref 0.6–1.3)
EOSINOPHIL # BLD AUTO: 0.12 THOUSAND/ΜL (ref 0–0.61)
EOSINOPHIL NFR BLD AUTO: 2 % (ref 0–6)
ERYTHROCYTE [DISTWIDTH] IN BLOOD BY AUTOMATED COUNT: 14.4 % (ref 11.6–15.1)
GFR SERPL CREATININE-BSD FRML MDRD: 98 ML/MIN/1.73SQ M
GLUCOSE SERPL-MCNC: 105 MG/DL (ref 65–140)
GLUCOSE SERPL-MCNC: 86 MG/DL (ref 65–140)
HCT VFR BLD AUTO: 39.7 % (ref 34.8–46.1)
HGB BLD-MCNC: 13.2 G/DL (ref 11.5–15.4)
IMM GRANULOCYTES # BLD AUTO: 0.02 THOUSAND/UL (ref 0–0.2)
IMM GRANULOCYTES NFR BLD AUTO: 0 % (ref 0–2)
LYMPHOCYTES # BLD AUTO: 1.65 THOUSANDS/ΜL (ref 0.6–4.47)
LYMPHOCYTES NFR BLD AUTO: 23 % (ref 14–44)
MCH RBC QN AUTO: 32 PG (ref 26.8–34.3)
MCHC RBC AUTO-ENTMCNC: 33.2 G/DL (ref 31.4–37.4)
MCV RBC AUTO: 96 FL (ref 82–98)
MONOCYTES # BLD AUTO: 0.63 THOUSAND/ΜL (ref 0.17–1.22)
MONOCYTES NFR BLD AUTO: 9 % (ref 4–12)
NEUTROPHILS # BLD AUTO: 4.75 THOUSANDS/ΜL (ref 1.85–7.62)
NEUTS SEG NFR BLD AUTO: 65 % (ref 43–75)
NRBC BLD AUTO-RTO: 0 /100 WBCS
PLATELET # BLD AUTO: 215 THOUSANDS/UL (ref 149–390)
PMV BLD AUTO: 10.9 FL (ref 8.9–12.7)
POTASSIUM SERPL-SCNC: 3.8 MMOL/L (ref 3.5–5.3)
PROCALCITONIN SERPL-MCNC: <0.05 NG/ML
RBC # BLD AUTO: 4.13 MILLION/UL (ref 3.81–5.12)
SODIUM SERPL-SCNC: 139 MMOL/L (ref 136–145)
WBC # BLD AUTO: 7.23 THOUSAND/UL (ref 4.31–10.16)

## 2020-04-07 PROCEDURE — NC001 PR NO CHARGE: Performed by: INTERNAL MEDICINE

## 2020-04-07 PROCEDURE — 82272 OCCULT BLD FECES 1-3 TESTS: CPT | Performed by: INTERNAL MEDICINE

## 2020-04-07 PROCEDURE — 80048 BASIC METABOLIC PNL TOTAL CA: CPT | Performed by: PHYSICIAN ASSISTANT

## 2020-04-07 PROCEDURE — 99232 SBSQ HOSP IP/OBS MODERATE 35: CPT | Performed by: INTERNAL MEDICINE

## 2020-04-07 PROCEDURE — C9113 INJ PANTOPRAZOLE SODIUM, VIA: HCPCS | Performed by: INTERNAL MEDICINE

## 2020-04-07 PROCEDURE — 88305 TISSUE EXAM BY PATHOLOGIST: CPT | Performed by: PATHOLOGY

## 2020-04-07 PROCEDURE — 88112 CYTOPATH CELL ENHANCE TECH: CPT | Performed by: PATHOLOGY

## 2020-04-07 PROCEDURE — 31622 DX BRONCHOSCOPE/WASH: CPT | Performed by: INTERNAL MEDICINE

## 2020-04-07 PROCEDURE — G0379 DIRECT REFER HOSPITAL OBSERV: HCPCS

## 2020-04-07 PROCEDURE — 82948 REAGENT STRIP/BLOOD GLUCOSE: CPT

## 2020-04-07 PROCEDURE — 87070 CULTURE OTHR SPECIMN AEROBIC: CPT | Performed by: INTERNAL MEDICINE

## 2020-04-07 PROCEDURE — 0BD88ZX EXTRACTION OF LEFT UPPER LOBE BRONCHUS, VIA NATURAL OR ARTIFICIAL OPENING ENDOSCOPIC, DIAGNOSTIC: ICD-10-PCS | Performed by: INTERNAL MEDICINE

## 2020-04-07 PROCEDURE — 85025 COMPLETE CBC W/AUTO DIFF WBC: CPT | Performed by: INTERNAL MEDICINE

## 2020-04-07 PROCEDURE — 84145 PROCALCITONIN (PCT): CPT | Performed by: PHYSICIAN ASSISTANT

## 2020-04-07 PROCEDURE — 0W3Q8ZZ CONTROL BLEEDING IN RESPIRATORY TRACT, VIA NATURAL OR ARTIFICIAL OPENING ENDOSCOPIC: ICD-10-PCS | Performed by: INTERNAL MEDICINE

## 2020-04-07 RX ORDER — PROPOFOL 10 MG/ML
INJECTION, EMULSION INTRAVENOUS CONTINUOUS PRN
Status: DISCONTINUED | OUTPATIENT
Start: 2020-04-07 | End: 2020-04-07 | Stop reason: SURG

## 2020-04-07 RX ORDER — NICOTINE 21 MG/24HR
21 PATCH, TRANSDERMAL 24 HOURS TRANSDERMAL DAILY
Status: DISCONTINUED | OUTPATIENT
Start: 2020-04-07 | End: 2020-04-08 | Stop reason: HOSPADM

## 2020-04-07 RX ORDER — PROPOFOL 10 MG/ML
INJECTION, EMULSION INTRAVENOUS AS NEEDED
Status: DISCONTINUED | OUTPATIENT
Start: 2020-04-07 | End: 2020-04-07 | Stop reason: SURG

## 2020-04-07 RX ORDER — SODIUM CHLORIDE, SODIUM LACTATE, POTASSIUM CHLORIDE, CALCIUM CHLORIDE 600; 310; 30; 20 MG/100ML; MG/100ML; MG/100ML; MG/100ML
125 INJECTION, SOLUTION INTRAVENOUS CONTINUOUS
Status: DISCONTINUED | OUTPATIENT
Start: 2020-04-07 | End: 2020-04-07

## 2020-04-07 RX ORDER — SODIUM CHLORIDE 9 MG/ML
75 INJECTION, SOLUTION INTRAVENOUS CONTINUOUS
Status: DISCONTINUED | OUTPATIENT
Start: 2020-04-07 | End: 2020-04-07

## 2020-04-07 RX ORDER — SODIUM CHLORIDE, SODIUM LACTATE, POTASSIUM CHLORIDE, CALCIUM CHLORIDE 600; 310; 30; 20 MG/100ML; MG/100ML; MG/100ML; MG/100ML
INJECTION, SOLUTION INTRAVENOUS CONTINUOUS PRN
Status: DISCONTINUED | OUTPATIENT
Start: 2020-04-07 | End: 2020-04-07

## 2020-04-07 RX ADMIN — PROPOFOL 150 MCG/KG/MIN: 10 INJECTION, EMULSION INTRAVENOUS at 12:50

## 2020-04-07 RX ADMIN — NICOTINE 21 MG: 21 PATCH, EXTENDED RELEASE TRANSDERMAL at 22:50

## 2020-04-07 RX ADMIN — NICOTINE 21 MG: 21 PATCH, EXTENDED RELEASE TRANSDERMAL at 09:47

## 2020-04-07 RX ADMIN — PANTOPRAZOLE SODIUM 40 MG: 40 INJECTION, POWDER, FOR SOLUTION INTRAVENOUS at 09:46

## 2020-04-07 RX ADMIN — PROPOFOL 220 MG: 10 INJECTION, EMULSION INTRAVENOUS at 12:49

## 2020-04-07 RX ADMIN — SODIUM CHLORIDE 75 ML/HR: 0.9 INJECTION, SOLUTION INTRAVENOUS at 09:46

## 2020-04-08 VITALS
BODY MASS INDEX: 26.51 KG/M2 | RESPIRATION RATE: 16 BRPM | HEART RATE: 97 BPM | DIASTOLIC BLOOD PRESSURE: 82 MMHG | OXYGEN SATURATION: 96 % | WEIGHT: 149.6 LBS | SYSTOLIC BLOOD PRESSURE: 149 MMHG | TEMPERATURE: 97.8 F | HEIGHT: 63 IN

## 2020-04-08 LAB
BACTERIA SPT RESP CULT: ABNORMAL
BACTERIA SPT RESP CULT: ABNORMAL
ERYTHROCYTE [DISTWIDTH] IN BLOOD BY AUTOMATED COUNT: 14.1 % (ref 11.6–15.1)
GRAM STN SPEC: ABNORMAL
HCT VFR BLD AUTO: 38.7 % (ref 34.8–46.1)
HEMOCCULT STL QL: POSITIVE
HEMOCCULT STL QL: POSITIVE
HGB BLD-MCNC: 12.7 G/DL (ref 11.5–15.4)
MCH RBC QN AUTO: 32.2 PG (ref 26.8–34.3)
MCHC RBC AUTO-ENTMCNC: 32.8 G/DL (ref 31.4–37.4)
MCV RBC AUTO: 98 FL (ref 82–98)
PLATELET # BLD AUTO: 204 THOUSANDS/UL (ref 149–390)
PMV BLD AUTO: 10.9 FL (ref 8.9–12.7)
RBC # BLD AUTO: 3.94 MILLION/UL (ref 3.81–5.12)
WBC # BLD AUTO: 6.65 THOUSAND/UL (ref 4.31–10.16)

## 2020-04-08 PROCEDURE — 99239 HOSP IP/OBS DSCHRG MGMT >30: CPT | Performed by: INTERNAL MEDICINE

## 2020-04-08 PROCEDURE — 85027 COMPLETE CBC AUTOMATED: CPT | Performed by: INTERNAL MEDICINE

## 2020-04-08 PROCEDURE — C9113 INJ PANTOPRAZOLE SODIUM, VIA: HCPCS | Performed by: INTERNAL MEDICINE

## 2020-04-08 PROCEDURE — G0379 DIRECT REFER HOSPITAL OBSERV: HCPCS

## 2020-04-08 RX ORDER — NICOTINE 21 MG/24HR
1 PATCH, TRANSDERMAL 24 HOURS TRANSDERMAL DAILY
Qty: 28 PATCH | Refills: 0 | Status: SHIPPED | OUTPATIENT
Start: 2020-04-09 | End: 2020-04-15 | Stop reason: SDUPTHER

## 2020-04-08 RX ADMIN — NICOTINE 21 MG: 21 PATCH, EXTENDED RELEASE TRANSDERMAL at 08:42

## 2020-04-08 RX ADMIN — PANTOPRAZOLE SODIUM 40 MG: 40 INJECTION, POWDER, FOR SOLUTION INTRAVENOUS at 08:41

## 2020-04-09 LAB
BACTERIA BRONCH AEROBE CULT: NORMAL
GRAM STN SPEC: NORMAL
GRAM STN SPEC: NORMAL

## 2020-04-10 ENCOUNTER — TELEPHONE (OUTPATIENT)
Dept: PULMONOLOGY | Facility: CLINIC | Age: 55
End: 2020-04-10

## 2020-04-10 DIAGNOSIS — R93.89 ABNORMAL CHEST CT: Primary | ICD-10-CM

## 2020-04-10 LAB
BACTERIA BLD CULT: NORMAL
BACTERIA BLD CULT: NORMAL

## 2020-04-13 ENCOUNTER — TRANSITIONAL CARE MANAGEMENT (OUTPATIENT)
Dept: FAMILY MEDICINE CLINIC | Facility: CLINIC | Age: 55
End: 2020-04-13

## 2020-04-15 ENCOUNTER — OFFICE VISIT (OUTPATIENT)
Dept: FAMILY MEDICINE CLINIC | Facility: CLINIC | Age: 55
End: 2020-04-15
Payer: COMMERCIAL

## 2020-04-15 VITALS — HEIGHT: 63 IN | TEMPERATURE: 97.9 F | BODY MASS INDEX: 26.4 KG/M2 | WEIGHT: 149 LBS

## 2020-04-15 DIAGNOSIS — Z72.0 TOBACCO USE: ICD-10-CM

## 2020-04-15 DIAGNOSIS — R04.2 HEMOPTYSIS: ICD-10-CM

## 2020-04-15 DIAGNOSIS — IMO0001 TRANSITION OF CARE PERFORMED WITH SHARING OF CLINICAL SUMMARY: Primary | ICD-10-CM

## 2020-04-15 DIAGNOSIS — R19.5 POSITIVE FECAL OCCULT BLOOD TEST: ICD-10-CM

## 2020-04-15 PROBLEM — Z78.9 TRANSITION OF CARE PERFORMED WITH SHARING OF CLINICAL SUMMARY: Status: ACTIVE | Noted: 2020-04-15

## 2020-04-15 PROCEDURE — 1111F DSCHRG MED/CURRENT MED MERGE: CPT | Performed by: NURSE PRACTITIONER

## 2020-04-15 PROCEDURE — 99495 TRANSJ CARE MGMT MOD F2F 14D: CPT | Performed by: NURSE PRACTITIONER

## 2020-04-15 RX ORDER — NICOTINE 21 MG/24HR
1 PATCH, TRANSDERMAL 24 HOURS TRANSDERMAL DAILY
Qty: 28 PATCH | Refills: 2 | Status: SHIPPED | OUTPATIENT
Start: 2020-04-15 | End: 2020-07-27 | Stop reason: SDUPTHER

## 2020-04-17 ENCOUNTER — TELEPHONE (OUTPATIENT)
Dept: PULMONOLOGY | Facility: CLINIC | Age: 55
End: 2020-04-17

## 2020-04-17 ENCOUNTER — HOSPITAL ENCOUNTER (OUTPATIENT)
Dept: CT IMAGING | Facility: CLINIC | Age: 55
Discharge: HOME | End: 2020-04-17
Payer: COMMERCIAL

## 2020-04-17 DIAGNOSIS — R93.89 ABNORMAL CHEST CT: ICD-10-CM

## 2020-04-17 PROCEDURE — 71250 CT THORAX DX C-: CPT

## 2020-04-27 ENCOUNTER — TELEMEDICINE (OUTPATIENT)
Dept: PULMONOLOGY | Facility: CLINIC | Age: 55
End: 2020-04-27
Payer: COMMERCIAL

## 2020-04-27 ENCOUNTER — APPOINTMENT (OUTPATIENT)
Dept: LAB | Facility: HOSPITAL | Age: 55
End: 2020-04-27
Payer: COMMERCIAL

## 2020-04-27 VITALS — HEIGHT: 64 IN | WEIGHT: 149 LBS | TEMPERATURE: 99.3 F | BODY MASS INDEX: 25.44 KG/M2

## 2020-04-27 DIAGNOSIS — Z72.0 TOBACCO USE: ICD-10-CM

## 2020-04-27 DIAGNOSIS — R04.2 HEMOPTYSIS: ICD-10-CM

## 2020-04-27 DIAGNOSIS — R93.89 ABNORMAL CHEST CT: Primary | ICD-10-CM

## 2020-04-27 DIAGNOSIS — R93.89 ABNORMAL CHEST CT: ICD-10-CM

## 2020-04-27 PROBLEM — B37.0 ORAL THRUSH: Status: RESOLVED | Noted: 2019-07-30 | Resolved: 2020-04-27

## 2020-04-27 LAB
CRP SERPL QL: <3 MG/L
ERYTHROCYTE [SEDIMENTATION RATE] IN BLOOD: 16 MM/HOUR (ref 2–25)

## 2020-04-27 PROCEDURE — 86140 C-REACTIVE PROTEIN: CPT

## 2020-04-27 PROCEDURE — 86430 RHEUMATOID FACTOR TEST QUAL: CPT

## 2020-04-27 PROCEDURE — 1111F DSCHRG MED/CURRENT MED MERGE: CPT | Performed by: NURSE PRACTITIONER

## 2020-04-27 PROCEDURE — 86038 ANTINUCLEAR ANTIBODIES: CPT

## 2020-04-27 PROCEDURE — 85652 RBC SED RATE AUTOMATED: CPT

## 2020-04-27 PROCEDURE — 86200 CCP ANTIBODY: CPT

## 2020-04-27 PROCEDURE — 86431 RHEUMATOID FACTOR QUANT: CPT

## 2020-04-27 PROCEDURE — 86255 FLUORESCENT ANTIBODY SCREEN: CPT

## 2020-04-27 PROCEDURE — 36415 COLL VENOUS BLD VENIPUNCTURE: CPT

## 2020-04-27 PROCEDURE — 99214 OFFICE O/P EST MOD 30 MIN: CPT | Performed by: NURSE PRACTITIONER

## 2020-04-28 ENCOUNTER — HOSPITAL ENCOUNTER (OUTPATIENT)
Dept: RADIOLOGY | Facility: MEDICAL CENTER | Age: 55
Discharge: HOME | End: 2020-04-28

## 2020-04-28 LAB
CRYOGLOB RF SER-ACNC: ABNORMAL [IU]/ML
RHEUMATOID FACT SER QL LA: POSITIVE

## 2020-04-29 ENCOUNTER — TELEMEDICINE (OUTPATIENT)
Dept: FAMILY MEDICINE CLINIC | Facility: CLINIC | Age: 55
End: 2020-04-29
Payer: COMMERCIAL

## 2020-04-29 DIAGNOSIS — R93.89 ABNORMAL CT SCAN, CHEST: ICD-10-CM

## 2020-04-29 DIAGNOSIS — Z12.11 COLON CANCER SCREENING: Primary | ICD-10-CM

## 2020-04-29 LAB
C-ANCA TITR SER IF: NORMAL TITER
CCP IGA+IGG SERPL IA-ACNC: 9 UNITS (ref 0–19)
MYELOPEROXIDASE AB SER IA-ACNC: <9 U/ML (ref 0–9)
P-ANCA ATYPICAL TITR SER IF: NORMAL TITER
P-ANCA TITR SER IF: NORMAL TITER
PROTEINASE3 AB SER IA-ACNC: <3.5 U/ML (ref 0–3.5)
RYE IGE QN: NEGATIVE

## 2020-04-29 PROCEDURE — 99212 OFFICE O/P EST SF 10 MIN: CPT | Performed by: NURSE PRACTITIONER

## 2020-04-29 PROCEDURE — 1111F DSCHRG MED/CURRENT MED MERGE: CPT | Performed by: NURSE PRACTITIONER

## 2020-04-30 ENCOUNTER — APPOINTMENT (OUTPATIENT)
Dept: LAB | Facility: HOSPITAL | Age: 55
End: 2020-04-30
Payer: COMMERCIAL

## 2020-04-30 ENCOUNTER — TELEPHONE (OUTPATIENT)
Dept: OTHER | Facility: HOSPITAL | Age: 55
End: 2020-04-30

## 2020-04-30 DIAGNOSIS — R93.89 ABNORMAL CT SCAN, CHEST: Primary | ICD-10-CM

## 2020-04-30 DIAGNOSIS — R93.89 ABNORMAL CT SCAN, CHEST: ICD-10-CM

## 2020-04-30 DIAGNOSIS — R04.2 HEMOPTYSIS: ICD-10-CM

## 2020-04-30 LAB
BACTERIA UR QL AUTO: ABNORMAL /HPF
BILIRUB UR QL STRIP: NEGATIVE
CLARITY UR: CLEAR
COLOR UR: YELLOW
GLUCOSE UR STRIP-MCNC: NEGATIVE MG/DL
HGB UR QL STRIP.AUTO: ABNORMAL
KETONES UR STRIP-MCNC: NEGATIVE MG/DL
LEUKOCYTE ESTERASE UR QL STRIP: NEGATIVE
NITRITE UR QL STRIP: NEGATIVE
NON-SQ EPI CELLS URNS QL MICRO: ABNORMAL /HPF
PH UR STRIP.AUTO: 5.5 [PH]
PROT UR STRIP-MCNC: NEGATIVE MG/DL
RBC #/AREA URNS AUTO: ABNORMAL /HPF
SP GR UR STRIP.AUTO: >=1.03 (ref 1–1.03)
UROBILINOGEN UR QL STRIP.AUTO: 0.2 E.U./DL
WBC #/AREA URNS AUTO: ABNORMAL /HPF

## 2020-04-30 PROCEDURE — 82232 ASSAY OF BETA-2 PROTEIN: CPT | Performed by: NURSE PRACTITIONER

## 2020-04-30 PROCEDURE — 86147 CARDIOLIPIN ANTIBODY EA IG: CPT

## 2020-04-30 PROCEDURE — 36415 COLL VENOUS BLD VENIPUNCTURE: CPT

## 2020-04-30 PROCEDURE — 81001 URINALYSIS AUTO W/SCOPE: CPT | Performed by: NURSE PRACTITIONER

## 2020-04-30 PROCEDURE — 82232 ASSAY OF BETA-2 PROTEIN: CPT

## 2020-04-30 PROCEDURE — 83520 IMMUNOASSAY QUANT NOS NONAB: CPT

## 2020-05-01 LAB
B2 MICROGLOB SERPL-MCNC: 1.6 MG/L (ref 0.6–2.4)
CARDIOLIPIN IGA SER IA-ACNC: <9 APL U/ML (ref 0–11)
CARDIOLIPIN IGG SER IA-ACNC: <9 GPL U/ML (ref 0–14)
CARDIOLIPIN IGM SER IA-ACNC: 10 MPL U/ML (ref 0–12)

## 2020-05-04 LAB
B2 MICROGLOB UR-MCNC: 49 UG/L (ref 0–300)
GBM AB SER IA-ACNC: 3 UNITS (ref 0–20)

## 2020-05-13 ENCOUNTER — TELEPHONE (OUTPATIENT)
Dept: PULMONOLOGY | Facility: CLINIC | Age: 55
End: 2020-05-13

## 2020-05-19 ENCOUNTER — TELEMEDICINE (OUTPATIENT)
Dept: GASTROENTEROLOGY | Facility: CLINIC | Age: 55
End: 2020-05-19
Payer: COMMERCIAL

## 2020-05-19 DIAGNOSIS — R19.5 OCCULT BLOOD IN STOOLS: Primary | ICD-10-CM

## 2020-05-19 DIAGNOSIS — Z86.010 HISTORY OF COLON POLYPS: ICD-10-CM

## 2020-05-19 DIAGNOSIS — Z12.11 COLON CANCER SCREENING: ICD-10-CM

## 2020-05-19 DIAGNOSIS — Z11.59 SPECIAL SCREENING EXAMINATION FOR UNSPECIFIED VIRAL DISEASE: ICD-10-CM

## 2020-05-19 PROCEDURE — 99203 OFFICE O/P NEW LOW 30 MIN: CPT | Performed by: INTERNAL MEDICINE

## 2020-05-20 DIAGNOSIS — R19.5 OCCULT BLOOD IN STOOLS: ICD-10-CM

## 2020-05-20 DIAGNOSIS — Z11.59 SPECIAL SCREENING EXAMINATION FOR UNSPECIFIED VIRAL DISEASE: ICD-10-CM

## 2020-05-20 DIAGNOSIS — Z86.010 HISTORY OF COLON POLYPS: ICD-10-CM

## 2020-05-20 DIAGNOSIS — Z12.11 COLON CANCER SCREENING: ICD-10-CM

## 2020-05-20 PROCEDURE — U0003 INFECTIOUS AGENT DETECTION BY NUCLEIC ACID (DNA OR RNA); SEVERE ACUTE RESPIRATORY SYNDROME CORONAVIRUS 2 (SARS-COV-2) (CORONAVIRUS DISEASE [COVID-19]), AMPLIFIED PROBE TECHNIQUE, MAKING USE OF HIGH THROUGHPUT TECHNOLOGIES AS DESCRIBED BY CMS-2020-01-R: HCPCS

## 2020-05-21 LAB — SARS-COV-2 RNA SPEC QL NAA+PROBE: NOT DETECTED

## 2020-05-26 ENCOUNTER — ANESTHESIA EVENT (OUTPATIENT)
Dept: GASTROENTEROLOGY | Facility: HOSPITAL | Age: 55
End: 2020-05-26

## 2020-05-27 ENCOUNTER — TELEPHONE (OUTPATIENT)
Dept: GASTROENTEROLOGY | Facility: CLINIC | Age: 55
End: 2020-05-27

## 2020-05-27 ENCOUNTER — ANESTHESIA (OUTPATIENT)
Dept: GASTROENTEROLOGY | Facility: HOSPITAL | Age: 55
End: 2020-05-27

## 2020-05-27 ENCOUNTER — HOSPITAL ENCOUNTER (OUTPATIENT)
Dept: GASTROENTEROLOGY | Facility: HOSPITAL | Age: 55
Setting detail: OUTPATIENT SURGERY
Discharge: HOME | End: 2020-05-27
Attending: INTERNAL MEDICINE | Admitting: INTERNAL MEDICINE
Payer: COMMERCIAL

## 2020-05-27 VITALS
HEART RATE: 77 BPM | TEMPERATURE: 97.7 F | BODY MASS INDEX: 26.33 KG/M2 | DIASTOLIC BLOOD PRESSURE: 89 MMHG | WEIGHT: 148.59 LBS | SYSTOLIC BLOOD PRESSURE: 156 MMHG | HEIGHT: 63 IN | OXYGEN SATURATION: 100 % | RESPIRATION RATE: 20 BRPM

## 2020-05-27 DIAGNOSIS — R19.5 OCCULT BLOOD IN STOOLS: ICD-10-CM

## 2020-05-27 DIAGNOSIS — Z11.59 SPECIAL SCREENING EXAMINATION FOR UNSPECIFIED VIRAL DISEASE: ICD-10-CM

## 2020-05-27 DIAGNOSIS — Z12.11 COLON CANCER SCREENING: ICD-10-CM

## 2020-05-27 DIAGNOSIS — Z86.010 HISTORY OF COLON POLYPS: ICD-10-CM

## 2020-05-27 PROCEDURE — 88305 TISSUE EXAM BY PATHOLOGIST: CPT | Performed by: PATHOLOGY

## 2020-05-27 PROCEDURE — NC001 PR NO CHARGE: Performed by: INTERNAL MEDICINE

## 2020-05-27 PROCEDURE — 43239 EGD BIOPSY SINGLE/MULTIPLE: CPT | Performed by: INTERNAL MEDICINE

## 2020-05-27 PROCEDURE — 45378 DIAGNOSTIC COLONOSCOPY: CPT | Performed by: INTERNAL MEDICINE

## 2020-05-27 RX ORDER — PROPOFOL 10 MG/ML
INJECTION, EMULSION INTRAVENOUS AS NEEDED
Status: DISCONTINUED | OUTPATIENT
Start: 2020-05-27 | End: 2020-05-27 | Stop reason: SURG

## 2020-05-27 RX ORDER — SODIUM CHLORIDE, SODIUM LACTATE, POTASSIUM CHLORIDE, CALCIUM CHLORIDE 600; 310; 30; 20 MG/100ML; MG/100ML; MG/100ML; MG/100ML
125 INJECTION, SOLUTION INTRAVENOUS CONTINUOUS
Status: DISCONTINUED | OUTPATIENT
Start: 2020-05-27 | End: 2020-05-31 | Stop reason: HOSPADM

## 2020-05-27 RX ADMIN — SODIUM CHLORIDE, SODIUM LACTATE, POTASSIUM CHLORIDE, AND CALCIUM CHLORIDE: .6; .31; .03; .02 INJECTION, SOLUTION INTRAVENOUS at 07:38

## 2020-05-27 RX ADMIN — PROPOFOL 100 MG: 10 INJECTION, EMULSION INTRAVENOUS at 07:42

## 2020-05-27 RX ADMIN — PROPOFOL 30 MG: 10 INJECTION, EMULSION INTRAVENOUS at 07:54

## 2020-05-27 RX ADMIN — PROPOFOL 50 MG: 10 INJECTION, EMULSION INTRAVENOUS at 07:49

## 2020-05-27 RX ADMIN — PROPOFOL 50 MG: 10 INJECTION, EMULSION INTRAVENOUS at 07:46

## 2020-05-27 RX ADMIN — SODIUM CHLORIDE, SODIUM LACTATE, POTASSIUM CHLORIDE, AND CALCIUM CHLORIDE 125 ML/HR: .6; .31; .03; .02 INJECTION, SOLUTION INTRAVENOUS at 07:09

## 2020-06-01 ENCOUNTER — HOSPITAL ENCOUNTER (OUTPATIENT)
Dept: RADIOLOGY | Facility: MEDICAL CENTER | Age: 55
Discharge: HOME | End: 2020-06-01
Payer: COMMERCIAL

## 2020-06-01 DIAGNOSIS — Z13.820 SCREENING FOR OSTEOPOROSIS: ICD-10-CM

## 2020-06-01 DIAGNOSIS — M84.375A STRESS FRACTURE OF LEFT FOOT, INITIAL ENCOUNTER: ICD-10-CM

## 2020-06-01 PROCEDURE — 77080 DXA BONE DENSITY AXIAL: CPT

## 2020-06-04 ENCOUNTER — TELEPHONE (OUTPATIENT)
Dept: GASTROENTEROLOGY | Facility: CLINIC | Age: 55
End: 2020-06-04

## 2020-06-09 ENCOUNTER — OFFICE VISIT (OUTPATIENT)
Dept: FAMILY MEDICINE CLINIC | Facility: CLINIC | Age: 55
End: 2020-06-09
Payer: COMMERCIAL

## 2020-06-09 VITALS — OXYGEN SATURATION: 98 % | WEIGHT: 146 LBS | HEIGHT: 63 IN | BODY MASS INDEX: 25.87 KG/M2 | RESPIRATION RATE: 18 BRPM

## 2020-06-09 DIAGNOSIS — R39.9 UTI SYMPTOMS: Primary | ICD-10-CM

## 2020-06-09 LAB
SL AMB  POCT GLUCOSE, UA: NEGATIVE
SL AMB LEUKOCYTE ESTERASE,UA: ABNORMAL
SL AMB POCT BILIRUBIN,UA: NEGATIVE
SL AMB POCT BLOOD,UA: NEGATIVE
SL AMB POCT CLARITY,UA: ABNORMAL
SL AMB POCT COLOR,UA: YELLOW
SL AMB POCT KETONES,UA: NEGATIVE
SL AMB POCT NITRITE,UA: NEGATIVE
SL AMB POCT PH,UA: 6
SL AMB POCT SPECIFIC GRAVITY,UA: 1.01
SL AMB POCT URINE PROTEIN: NEGATIVE
SL AMB POCT UROBILINOGEN: 0.2

## 2020-06-09 PROCEDURE — 87086 URINE CULTURE/COLONY COUNT: CPT | Performed by: NURSE PRACTITIONER

## 2020-06-09 PROCEDURE — 1036F TOBACCO NON-USER: CPT | Performed by: NURSE PRACTITIONER

## 2020-06-09 PROCEDURE — 81003 URINALYSIS AUTO W/O SCOPE: CPT | Performed by: NURSE PRACTITIONER

## 2020-06-09 PROCEDURE — 99213 OFFICE O/P EST LOW 20 MIN: CPT | Performed by: NURSE PRACTITIONER

## 2020-06-09 PROCEDURE — 3008F BODY MASS INDEX DOCD: CPT | Performed by: NURSE PRACTITIONER

## 2020-06-09 RX ORDER — NITROFURANTOIN 25; 75 MG/1; MG/1
100 CAPSULE ORAL 2 TIMES DAILY
Qty: 10 CAPSULE | Refills: 0 | Status: SHIPPED | OUTPATIENT
Start: 2020-06-09 | End: 2020-06-14

## 2020-06-11 LAB — BACTERIA UR CULT: NORMAL

## 2020-06-23 ENCOUNTER — TELEPHONE (OUTPATIENT)
Dept: GASTROENTEROLOGY | Facility: CLINIC | Age: 55
End: 2020-06-23

## 2020-06-29 DIAGNOSIS — M75.21 BICEPS TENDINITIS OF RIGHT UPPER EXTREMITY: Primary | ICD-10-CM

## 2020-06-29 RX ORDER — IBUPROFEN 800 MG/1
800 TABLET ORAL EVERY 8 HOURS PRN
Qty: 90 TABLET | Refills: 0 | Status: SHIPPED | OUTPATIENT
Start: 2020-06-29

## 2020-06-29 RX ORDER — IBUPROFEN 800 MG/1
TABLET ORAL EVERY 8 HOURS PRN
COMMUNITY
End: 2020-06-29 | Stop reason: SDUPTHER

## 2020-07-27 DIAGNOSIS — Z72.0 TOBACCO USE: ICD-10-CM

## 2020-07-27 NOTE — TELEPHONE ENCOUNTER
Patient needs her patch refilled but she wanted to know if she could step it down   She said she has been on step #1 for a while now

## 2020-07-28 ENCOUNTER — OFFICE VISIT (OUTPATIENT)
Dept: FAMILY MEDICINE CLINIC | Facility: CLINIC | Age: 55
End: 2020-07-28
Payer: COMMERCIAL

## 2020-07-28 VITALS
BODY MASS INDEX: 25.41 KG/M2 | RESPIRATION RATE: 18 BRPM | WEIGHT: 143.38 LBS | SYSTOLIC BLOOD PRESSURE: 124 MMHG | TEMPERATURE: 97.8 F | OXYGEN SATURATION: 97 % | DIASTOLIC BLOOD PRESSURE: 76 MMHG | HEART RATE: 82 BPM | HEIGHT: 63 IN

## 2020-07-28 DIAGNOSIS — J06.9 VIRAL UPPER RESPIRATORY TRACT INFECTION: ICD-10-CM

## 2020-07-28 DIAGNOSIS — Z72.0 TOBACCO USE: ICD-10-CM

## 2020-07-28 DIAGNOSIS — J01.00 ACUTE NON-RECURRENT MAXILLARY SINUSITIS: Primary | ICD-10-CM

## 2020-07-28 PROCEDURE — 99214 OFFICE O/P EST MOD 30 MIN: CPT | Performed by: NURSE PRACTITIONER

## 2020-07-28 PROCEDURE — 3008F BODY MASS INDEX DOCD: CPT | Performed by: NURSE PRACTITIONER

## 2020-07-28 PROCEDURE — 1036F TOBACCO NON-USER: CPT | Performed by: NURSE PRACTITIONER

## 2020-07-28 RX ORDER — NICOTINE 21 MG/24HR
1 PATCH, TRANSDERMAL 24 HOURS TRANSDERMAL EVERY 24 HOURS
Qty: 28 PATCH | Refills: 5 | Status: SHIPPED | OUTPATIENT
Start: 2020-07-28 | End: 2020-12-21 | Stop reason: SDUPTHER

## 2020-07-28 RX ORDER — NICOTINE 21 MG/24HR
PATCH, TRANSDERMAL 24 HOURS TRANSDERMAL
COMMUNITY
Start: 2020-06-27 | End: 2020-07-28

## 2020-07-28 RX ORDER — CEFDINIR 300 MG/1
300 CAPSULE ORAL EVERY 12 HOURS SCHEDULED
Qty: 14 CAPSULE | Refills: 0 | Status: SHIPPED | OUTPATIENT
Start: 2020-07-28 | End: 2020-08-04

## 2020-07-28 RX ORDER — NICOTINE 21 MG/24HR
1 PATCH, TRANSDERMAL 24 HOURS TRANSDERMAL DAILY
Qty: 28 PATCH | Refills: 2 | Status: SHIPPED | OUTPATIENT
Start: 2020-07-28 | End: 2020-07-28

## 2020-07-28 NOTE — PATIENT INSTRUCTIONS
URI with chest wheezing- drink plenty of fluids to keep mucous thin  Start antibiotics as ordered  Sinus infection with middle ear effusion right side- ibuprofen or advil for pain, start antibiotics   Claritin D   Nicotine use- decrease dose of patch, per patient request

## 2020-07-28 NOTE — PROGRESS NOTES
Assessment/Plan:       Diagnoses and all orders for this visit:    Acute non-recurrent maxillary sinusitis  -     cefdinir (OMNICEF) 300 mg capsule; Take 1 capsule (300 mg total) by mouth every 12 (twelve) hours for 7 days    Tobacco use  -     nicotine (NICODERM CQ) 14 mg/24hr TD 24 hr patch; Place 1 patch on the skin every 24 hours    Viral upper respiratory tract infection  -     cefdinir (OMNICEF) 300 mg capsule; Take 1 capsule (300 mg total) by mouth every 12 (twelve) hours for 7 days    Other orders  -     Famotidine (PEPCID AC MAXIMUM STRENGTH PO)  -     Discontinue: RA NICOTINE 21 MG/24HR TD 24 hr patch; place 1 patch ON THE SKIN once daily        No problem-specific Assessment & Plan notes found for this encounter  Subjective:      Patient ID: Annita Spatz is a 63 y o  female  Patient is here to discuss sore throat  A few days ago, she started sneezing all day long and having post nasal drip  She then developed a severe sore throat and nausea  She felt "like I was swallowing glass"  She then developed pain right ear, right side jaw and teeth  She had chills  She started taking Claritin D which helped with congestion in her face  Patient would like to decrease the dose of her patch      The following portions of the patient's history were reviewed and updated as appropriate:   She has a past medical history of Choledocholithiasis (2017), Colon polyp, and GERD (gastroesophageal reflux disease)  ,  does not have any pertinent problems on file  ,   has a past surgical history that includes  section; CHOLECYSTECTOMY LAPAROSCOPIC (N/A, 2017); Cholecystectomy; ERCP (N/A, 2017); and Dental surgery  ,  family history includes Cancer in her father; Diabetes in her father; Hodgkin's lymphoma in her mother; Hypertension in her father  ,   reports that she has quit smoking  Her smoking use included cigarettes  She smoked 0 20 packs per day   She has never used smokeless tobacco  She reports that she drinks about 1 0 standard drinks of alcohol per week  She reports that she does not use drugs  ,  is allergic to bactrim [sulfamethoxazole-trimethoprim]; ciprofloxacin; erythromycin; erythromycin base; gluten meal; lactose; levaquin [levofloxacin]; penicillins; tetracycline; valtrex [valacyclovir]; and keflex [cephalexin]     Current Outpatient Medications   Medication Sig Dispense Refill    cefdinir (OMNICEF) 300 mg capsule Take 1 capsule (300 mg total) by mouth every 12 (twelve) hours for 7 days 14 capsule 0    cyclobenzaprine (FLEXERIL) 10 mg tablet Take 1 tablet (10 mg total) by mouth 3 (three) times a day as needed for muscle spasms for up to 5 days 15 tablet 0    Famotidine (PEPCID AC MAXIMUM STRENGTH PO)       ibuprofen (MOTRIN) 800 mg tablet Take 1 tablet (800 mg total) by mouth every 8 (eight) hours as needed for moderate pain 90 tablet 0    nicotine (NICODERM CQ) 14 mg/24hr TD 24 hr patch Place 1 patch on the skin every 24 hours 28 patch 5     No current facility-administered medications for this visit  Review of Systems   Constitutional: Positive for chills  Negative for fatigue and fever  HENT: Positive for congestion, ear pain, facial swelling, sinus pressure and sore throat  Negative for postnasal drip and sinus pain  Respiratory: Positive for shortness of breath  Negative for cough and chest tightness  Cardiovascular: Negative for chest pain and palpitations  Gastrointestinal: Negative for abdominal pain  Skin: Negative for color change, pallor and rash  Allergic/Immunologic: Negative for immunocompromised state  Neurological: Negative for headaches  Hematological: Negative for adenopathy  All other systems reviewed and are negative          Objective:  Vitals:    07/28/20 0756   BP: 124/76   BP Location: Left arm   Patient Position: Sitting   Pulse: 82   Resp: 18   Temp: 97 8 °F (36 6 °C)   SpO2: 97%   Weight: 65 kg (143 lb 6 oz)   Height: 5' 3" (1 6 m) Body mass index is 25 4 kg/m²  Physical Exam   Constitutional: She is oriented to person, place, and time  She appears well-developed and well-nourished  No distress  HENT:   Head: Normocephalic and atraumatic  Left Ear: External ear normal    Nose: Nose normal    Mouth/Throat: Oropharynx is clear and moist  No oropharyngeal exudate  Small effusion right ear   Eyes: Pupils are equal, round, and reactive to light  Conjunctivae and EOM are normal    Neck: Normal range of motion  Neck supple  Submandibular adenopathy   Cardiovascular: Normal rate, regular rhythm and normal heart sounds  Exam reveals no gallop and no friction rub  No murmur heard  Pulmonary/Chest: Effort normal  No respiratory distress  She has wheezes  She has no rales  Bilateral lower lobe wheezing   Abdominal: Soft  Bowel sounds are normal  She exhibits no distension  Musculoskeletal: Normal range of motion  Lymphadenopathy:     She has cervical adenopathy  Neurological: She is alert and oriented to person, place, and time  Skin: Skin is warm and dry  No rash noted  She is not diaphoretic  Psychiatric: She has a normal mood and affect  Her behavior is normal  Judgment and thought content normal    Nursing note and vitals reviewed

## 2020-10-04 DIAGNOSIS — Z72.0 TOBACCO USE: ICD-10-CM

## 2020-10-06 RX ORDER — NICOTINE 21 MG/24HR
PATCH, TRANSDERMAL 24 HOURS TRANSDERMAL
Qty: 28 PATCH | Refills: 2 | Status: SHIPPED | OUTPATIENT
Start: 2020-10-06 | End: 2020-12-21

## 2020-11-04 ENCOUNTER — OFFICE VISIT (OUTPATIENT)
Dept: FAMILY MEDICINE CLINIC | Facility: CLINIC | Age: 55
End: 2020-11-04
Payer: COMMERCIAL

## 2020-11-04 ENCOUNTER — APPOINTMENT (OUTPATIENT)
Dept: RADIOLOGY | Facility: CLINIC | Age: 55
End: 2020-11-04
Payer: COMMERCIAL

## 2020-11-04 VITALS
SYSTOLIC BLOOD PRESSURE: 118 MMHG | RESPIRATION RATE: 18 BRPM | HEIGHT: 63 IN | TEMPERATURE: 98.4 F | OXYGEN SATURATION: 99 % | DIASTOLIC BLOOD PRESSURE: 74 MMHG | BODY MASS INDEX: 28.35 KG/M2 | HEART RATE: 82 BPM | WEIGHT: 160 LBS

## 2020-11-04 DIAGNOSIS — M25.562 ACUTE PAIN OF LEFT KNEE: ICD-10-CM

## 2020-11-04 DIAGNOSIS — M25.562 ACUTE PAIN OF LEFT KNEE: Primary | ICD-10-CM

## 2020-11-04 PROCEDURE — 73564 X-RAY EXAM KNEE 4 OR MORE: CPT

## 2020-11-04 PROCEDURE — 99213 OFFICE O/P EST LOW 20 MIN: CPT | Performed by: NURSE PRACTITIONER

## 2020-11-11 DIAGNOSIS — M62.830 BACK MUSCLE SPASM: ICD-10-CM

## 2020-11-11 RX ORDER — CYCLOBENZAPRINE HCL 10 MG
10 TABLET ORAL 3 TIMES DAILY PRN
Qty: 15 TABLET | Refills: 0 | Status: SHIPPED | OUTPATIENT
Start: 2020-11-11 | End: 2020-12-03 | Stop reason: SDUPTHER

## 2020-11-18 ENCOUNTER — CONSULT (OUTPATIENT)
Dept: OBGYN CLINIC | Facility: CLINIC | Age: 55
End: 2020-11-18
Payer: COMMERCIAL

## 2020-11-18 VITALS
WEIGHT: 160 LBS | DIASTOLIC BLOOD PRESSURE: 83 MMHG | TEMPERATURE: 97.8 F | SYSTOLIC BLOOD PRESSURE: 134 MMHG | HEART RATE: 100 BPM | HEIGHT: 63 IN | BODY MASS INDEX: 28.35 KG/M2

## 2020-11-18 DIAGNOSIS — S83.412A SPRAIN OF MEDIAL COLLATERAL LIGAMENT OF LEFT KNEE, INITIAL ENCOUNTER: ICD-10-CM

## 2020-11-18 DIAGNOSIS — M22.2X2 PATELLOFEMORAL SYNDROME OF BOTH KNEES: ICD-10-CM

## 2020-11-18 DIAGNOSIS — M22.2X1 PATELLOFEMORAL SYNDROME OF BOTH KNEES: ICD-10-CM

## 2020-11-18 DIAGNOSIS — R29.898 WEAKNESS OF BOTH HIPS: ICD-10-CM

## 2020-11-18 DIAGNOSIS — M17.12 PRIMARY OSTEOARTHRITIS OF LEFT KNEE: Primary | ICD-10-CM

## 2020-11-18 DIAGNOSIS — M25.362 PATELLAR INSTABILITY OF LEFT KNEE: ICD-10-CM

## 2020-11-18 PROCEDURE — 3008F BODY MASS INDEX DOCD: CPT | Performed by: FAMILY MEDICINE

## 2020-11-18 PROCEDURE — 1036F TOBACCO NON-USER: CPT | Performed by: FAMILY MEDICINE

## 2020-11-18 PROCEDURE — 99244 OFF/OP CNSLTJ NEW/EST MOD 40: CPT | Performed by: FAMILY MEDICINE

## 2020-11-30 ENCOUNTER — EVALUATION (OUTPATIENT)
Dept: PHYSICAL THERAPY | Facility: CLINIC | Age: 55
End: 2020-11-30
Payer: COMMERCIAL

## 2020-11-30 DIAGNOSIS — M22.2X2 PATELLOFEMORAL SYNDROME OF BOTH KNEES: ICD-10-CM

## 2020-11-30 DIAGNOSIS — M22.2X1 PATELLOFEMORAL SYNDROME OF BOTH KNEES: ICD-10-CM

## 2020-11-30 DIAGNOSIS — R29.898 WEAKNESS OF BOTH HIPS: ICD-10-CM

## 2020-11-30 DIAGNOSIS — M25.362 PATELLAR INSTABILITY OF LEFT KNEE: ICD-10-CM

## 2020-11-30 DIAGNOSIS — S83.412A SPRAIN OF MEDIAL COLLATERAL LIGAMENT OF LEFT KNEE, INITIAL ENCOUNTER: ICD-10-CM

## 2020-11-30 PROCEDURE — 97161 PT EVAL LOW COMPLEX 20 MIN: CPT | Performed by: PHYSICAL MEDICINE & REHABILITATION

## 2020-12-03 DIAGNOSIS — M62.830 BACK MUSCLE SPASM: ICD-10-CM

## 2020-12-03 RX ORDER — CYCLOBENZAPRINE HCL 10 MG
10 TABLET ORAL 3 TIMES DAILY PRN
Qty: 15 TABLET | Refills: 0 | Status: SHIPPED | OUTPATIENT
Start: 2020-12-03 | End: 2021-02-22 | Stop reason: SDUPTHER

## 2020-12-09 ENCOUNTER — OFFICE VISIT (OUTPATIENT)
Dept: PHYSICAL THERAPY | Facility: CLINIC | Age: 55
End: 2020-12-09
Payer: COMMERCIAL

## 2020-12-09 DIAGNOSIS — M25.362 PATELLAR INSTABILITY OF LEFT KNEE: Primary | ICD-10-CM

## 2020-12-09 DIAGNOSIS — S83.412A SPRAIN OF MEDIAL COLLATERAL LIGAMENT OF LEFT KNEE, INITIAL ENCOUNTER: ICD-10-CM

## 2020-12-09 DIAGNOSIS — M22.2X2 PATELLOFEMORAL SYNDROME OF BOTH KNEES: ICD-10-CM

## 2020-12-09 DIAGNOSIS — M22.2X1 PATELLOFEMORAL SYNDROME OF BOTH KNEES: ICD-10-CM

## 2020-12-09 PROCEDURE — 97110 THERAPEUTIC EXERCISES: CPT

## 2020-12-09 PROCEDURE — 97140 MANUAL THERAPY 1/> REGIONS: CPT

## 2020-12-09 PROCEDURE — 97530 THERAPEUTIC ACTIVITIES: CPT

## 2020-12-14 ENCOUNTER — TELEPHONE (OUTPATIENT)
Dept: OBGYN CLINIC | Facility: HOSPITAL | Age: 55
End: 2020-12-14

## 2020-12-16 ENCOUNTER — APPOINTMENT (OUTPATIENT)
Dept: PHYSICAL THERAPY | Facility: CLINIC | Age: 55
End: 2020-12-16
Payer: COMMERCIAL

## 2020-12-19 ENCOUNTER — OFFICE VISIT (OUTPATIENT)
Dept: OBGYN CLINIC | Facility: CLINIC | Age: 55
End: 2020-12-19
Payer: COMMERCIAL

## 2020-12-19 ENCOUNTER — TELEPHONE (OUTPATIENT)
Dept: OBGYN CLINIC | Facility: CLINIC | Age: 55
End: 2020-12-19

## 2020-12-19 VITALS
WEIGHT: 164 LBS | SYSTOLIC BLOOD PRESSURE: 137 MMHG | BODY MASS INDEX: 29.06 KG/M2 | HEIGHT: 63 IN | DIASTOLIC BLOOD PRESSURE: 87 MMHG | HEART RATE: 103 BPM

## 2020-12-19 DIAGNOSIS — S89.92XD ACUTE INJURY OF LEFT KNEE CARTILAGE, SUBSEQUENT ENCOUNTER: Primary | ICD-10-CM

## 2020-12-19 PROCEDURE — 99213 OFFICE O/P EST LOW 20 MIN: CPT | Performed by: FAMILY MEDICINE

## 2020-12-19 PROCEDURE — 3008F BODY MASS INDEX DOCD: CPT | Performed by: FAMILY MEDICINE

## 2020-12-19 PROCEDURE — 1036F TOBACCO NON-USER: CPT | Performed by: FAMILY MEDICINE

## 2020-12-21 ENCOUNTER — OFFICE VISIT (OUTPATIENT)
Dept: FAMILY MEDICINE CLINIC | Facility: CLINIC | Age: 55
End: 2020-12-21
Payer: COMMERCIAL

## 2020-12-21 VITALS
OXYGEN SATURATION: 98 % | BODY MASS INDEX: 28.7 KG/M2 | DIASTOLIC BLOOD PRESSURE: 90 MMHG | SYSTOLIC BLOOD PRESSURE: 162 MMHG | WEIGHT: 162 LBS | HEIGHT: 63 IN | HEART RATE: 116 BPM | RESPIRATION RATE: 18 BRPM

## 2020-12-21 DIAGNOSIS — M25.462 KNEE EFFUSION, LEFT: ICD-10-CM

## 2020-12-21 DIAGNOSIS — H92.01 OTALGIA OF RIGHT EAR: Primary | ICD-10-CM

## 2020-12-21 DIAGNOSIS — Z72.0 TOBACCO USE: ICD-10-CM

## 2020-12-21 PROCEDURE — 99213 OFFICE O/P EST LOW 20 MIN: CPT | Performed by: NURSE PRACTITIONER

## 2020-12-21 RX ORDER — NICOTINE 21 MG/24HR
1 PATCH, TRANSDERMAL 24 HOURS TRANSDERMAL EVERY 24 HOURS
Qty: 28 PATCH | Refills: 5 | Status: SHIPPED | OUTPATIENT
Start: 2020-12-21

## 2020-12-28 ENCOUNTER — HOSPITAL ENCOUNTER (OUTPATIENT)
Dept: MRI IMAGING | Facility: HOSPITAL | Age: 55
Discharge: HOME | End: 2020-12-28
Payer: COMMERCIAL

## 2020-12-28 DIAGNOSIS — S89.92XD ACUTE INJURY OF LEFT KNEE CARTILAGE, SUBSEQUENT ENCOUNTER: ICD-10-CM

## 2020-12-28 PROCEDURE — G1004 CDSM NDSC: HCPCS

## 2020-12-28 PROCEDURE — 73721 MRI JNT OF LWR EXTRE W/O DYE: CPT

## 2020-12-30 ENCOUNTER — OFFICE VISIT (OUTPATIENT)
Dept: OBGYN CLINIC | Facility: CLINIC | Age: 55
End: 2020-12-30
Payer: COMMERCIAL

## 2020-12-30 VITALS
SYSTOLIC BLOOD PRESSURE: 110 MMHG | HEIGHT: 63 IN | WEIGHT: 163.3 LBS | DIASTOLIC BLOOD PRESSURE: 62 MMHG | BODY MASS INDEX: 28.93 KG/M2

## 2020-12-30 DIAGNOSIS — S83.412D SPRAIN OF MEDIAL COLLATERAL LIGAMENT OF LEFT KNEE, SUBSEQUENT ENCOUNTER: ICD-10-CM

## 2020-12-30 DIAGNOSIS — M17.12 PRIMARY OSTEOARTHRITIS OF LEFT KNEE: Primary | ICD-10-CM

## 2020-12-30 DIAGNOSIS — S83.241D ACUTE MEDIAL MENISCUS TEAR OF RIGHT KNEE, SUBSEQUENT ENCOUNTER: ICD-10-CM

## 2020-12-30 PROCEDURE — 3008F BODY MASS INDEX DOCD: CPT | Performed by: FAMILY MEDICINE

## 2020-12-30 PROCEDURE — 1036F TOBACCO NON-USER: CPT | Performed by: FAMILY MEDICINE

## 2020-12-30 PROCEDURE — 99213 OFFICE O/P EST LOW 20 MIN: CPT | Performed by: FAMILY MEDICINE

## 2021-01-04 ENCOUNTER — OFFICE VISIT (OUTPATIENT)
Dept: OBGYN CLINIC | Facility: CLINIC | Age: 56
End: 2021-01-04
Payer: COMMERCIAL

## 2021-01-04 VITALS
HEIGHT: 63 IN | BODY MASS INDEX: 29.23 KG/M2 | WEIGHT: 165 LBS | DIASTOLIC BLOOD PRESSURE: 90 MMHG | HEART RATE: 106 BPM | SYSTOLIC BLOOD PRESSURE: 156 MMHG

## 2021-01-04 DIAGNOSIS — M17.12 PRIMARY OSTEOARTHRITIS OF LEFT KNEE: Primary | ICD-10-CM

## 2021-01-04 DIAGNOSIS — S83.412D SPRAIN OF MEDIAL COLLATERAL LIGAMENT OF LEFT KNEE, SUBSEQUENT ENCOUNTER: ICD-10-CM

## 2021-01-04 DIAGNOSIS — S83.242D ACUTE MEDIAL MENISCUS TEAR OF LEFT KNEE, SUBSEQUENT ENCOUNTER: ICD-10-CM

## 2021-01-04 PROCEDURE — 1036F TOBACCO NON-USER: CPT | Performed by: FAMILY MEDICINE

## 2021-01-04 PROCEDURE — 99213 OFFICE O/P EST LOW 20 MIN: CPT | Performed by: FAMILY MEDICINE

## 2021-01-04 PROCEDURE — 3008F BODY MASS INDEX DOCD: CPT | Performed by: FAMILY MEDICINE

## 2021-01-04 NOTE — PROGRESS NOTES
Assessment/Plan:  Assessment/Plan   Diagnoses and all orders for this visit:    Primary osteoarthritis of left knee    Acute medial meniscus tear of left knee, subsequent encounter    Sprain of medial collateral ligament of left knee, subsequent encounter         63year-old active female with left knee pain and swelling of more than 4 months duration  Discussed with patient physical exam, impression and plan by physical noted for mild swelling of the knee  She has full extension and flexion to 110°  Clinical impression that she is improving  I discussed with patient that since swelling has improved there is no need for aspiration at this time  She may discontinue diclofenac since she is experiencing adverse effects  She is to continue with topical Voltaren gel and taking supplements  She will return to office to be provided with medial  brace which she is to wear during physical activity  She may continue with physical therapy and home exercises  She will follow up with me in 5 weeks at which point she will be re-evaluated  Subjective:   Patient ID: Julita Grant is a 63 y o  female  Chief Complaint   Patient presents with    Left Knee - Pain, Follow-up       50-year-old active female following up for left knee pain and swelling of more than 4 months duration  She was last seen by me 5 days ago at which point MRI reviewed with her was noted for medial meniscus tear, MCL sprain, and grade 3 cartilage loss medial femoral condyle  She was provided option of knee aspiration and corticosteroid injection  She deferred injection at that time and returns today for aspiration/injection  She reports that since starting diclofenac pain and swelling have dramatically improved    Pain now described as generalized to the knee but worse at the anterior and posterior aspects, rated as 1-3 out of 10 intensity, worse with prolonged standing and ambulation, associated swelling and sensation of stiffness, and improved with resting  She reports still feeling stiff in the left knee  She has been applying topical Voltaren gel which she states also helps with her pain  Knee Pain  This is a new problem  The current episode started more than 1 month ago  The problem occurs daily  The problem has been rapidly improving  Associated symptoms include arthralgias and joint swelling  Pertinent negatives include no numbness or weakness  The symptoms are aggravated by standing, twisting and walking  She has tried rest and NSAIDs for the symptoms  The treatment provided moderate relief  Review of Systems   Musculoskeletal: Positive for arthralgias and joint swelling  Neurological: Negative for weakness and numbness  Objective:  Vitals:    01/04/21 0932   BP: 156/90   Pulse: (!) 106   Weight: 74 8 kg (165 lb)   Height: 5' 3" (1 6 m)     Left Knee Exam     Muscle Strength   The patient has normal left knee strength  Range of Motion   Extension: normal   Flexion: 110     Other   Swelling: mild            Physical Exam  Vitals signs and nursing note reviewed  Constitutional:       General: She is not in acute distress  Appearance: She is well-developed  HENT:      Head: Normocephalic  Right Ear: External ear normal       Left Ear: External ear normal    Eyes:      Conjunctiva/sclera: Conjunctivae normal    Neck:      Trachea: No tracheal deviation  Cardiovascular:      Comments: Tachycardic  Pulmonary:      Effort: Pulmonary effort is normal  No respiratory distress  Abdominal:      General: There is no distension  Musculoskeletal:         General: Swelling present  Skin:     General: Skin is warm and dry  Neurological:      Mental Status: She is alert and oriented to person, place, and time     Psychiatric:         Behavior: Behavior normal

## 2021-01-05 ENCOUNTER — TELEPHONE (OUTPATIENT)
Dept: OBGYN CLINIC | Facility: HOSPITAL | Age: 56
End: 2021-01-05

## 2021-01-05 NOTE — TELEPHONE ENCOUNTER
Dr Jeremias Álvarez    [phone]    Patient states that the office was to call to schedule an appt for her  to be fitted for a brace for her left knee  Please advise

## 2021-02-08 ENCOUNTER — TELEPHONE (OUTPATIENT)
Dept: OBGYN CLINIC | Facility: HOSPITAL | Age: 56
End: 2021-02-08

## 2021-02-08 NOTE — TELEPHONE ENCOUNTER
Patient sees Dr Tate Murray    Patient is calling to notify the doctor that she's taken off the knee brace  Patient states her knee has been swelling and in discomfort from the brace  Patient states she's been cautious with her level of activity with the knee  Patient is scheduled for her physical therapy evaluation on 2/11  She let them know that she took the brace off her knee and they advised her to notify the doctor of this decision she made      Call back # [phone]

## 2021-02-11 ENCOUNTER — APPOINTMENT (OUTPATIENT)
Dept: PHYSICAL THERAPY | Facility: CLINIC | Age: 56
End: 2021-02-11
Payer: COMMERCIAL

## 2021-02-11 NOTE — PROGRESS NOTES
PT Re-Evaluation     Today's date: 2021  Patient name: Og Eubanks  : 1965  MRN: [id number]  Referring provider: Cynthia Pena DO  Dx: No diagnosis found                 Assessment/Plan    Subjective    Objective     General Comments:      Knee Comments  PROM 0-120, limited secondary to pain  R knee 0-135 deg  (+) valgus stress at 0 and 30 for discomfort, no appreciable laxity noted vs  R knee     Strength  Knee flex/ext, hip flex/ext, hip IR/ER grossly 4+/5 with mild discomfort and apprehension   Minimal hypomobility noted with patellar testing, however discomfort with passive movement  Mild swelling through medial and posterior aspect of L knee vs  R      Minimal HS length deficit             Precautions: Chronicity of sxs        Manuals                    L knee K tap for inferior patellar support Trialed today, LH U shape  And I shape SC                                                                                            Neuro Re-Ed                       SLS   30: x 2                    AAROM LAQ w/ eccentric lower   2x 10 b/l                                                                                                                                            Ther Ex                       Bike or nustep    10 min L5                    HR/TR   2x10 ea                     Quad set HEP                     Standing hip abd/ext/add   2x 10 abd and ext                     Seated hip IR/ER                                                                                                                       Ther Activity                       Lateral step ups    6" 2x 10                    Step ups and step downs    6" 2x 10                    Gait Training                                                                       Modalities

## 2021-02-16 ENCOUNTER — EVALUATION (OUTPATIENT)
Dept: PHYSICAL THERAPY | Facility: CLINIC | Age: 56
End: 2021-02-16
Payer: COMMERCIAL

## 2021-02-16 DIAGNOSIS — S83.412D SPRAIN OF MEDIAL COLLATERAL LIGAMENT OF LEFT KNEE, SUBSEQUENT ENCOUNTER: ICD-10-CM

## 2021-02-16 DIAGNOSIS — R29.898 WEAKNESS OF BOTH HIPS: ICD-10-CM

## 2021-02-16 DIAGNOSIS — M22.2X2 PATELLOFEMORAL SYNDROME OF BOTH KNEES: ICD-10-CM

## 2021-02-16 DIAGNOSIS — M22.2X1 PATELLOFEMORAL SYNDROME OF BOTH KNEES: ICD-10-CM

## 2021-02-16 DIAGNOSIS — M25.362 PATELLAR INSTABILITY OF LEFT KNEE: Primary | ICD-10-CM

## 2021-02-16 PROCEDURE — 97110 THERAPEUTIC EXERCISES: CPT | Performed by: PHYSICAL THERAPIST

## 2021-02-16 PROCEDURE — 97164 PT RE-EVAL EST PLAN CARE: CPT | Performed by: PHYSICAL THERAPIST

## 2021-02-16 NOTE — PROGRESS NOTES
PT Re-Evaluation     Today's date: 2021  Patient name: Emely Guerra  : 1965  MRN: [id number]  Referring provider: Ang Gutierrez DO  Dx:   Encounter Diagnosis     ICD-10-CM    1  Patellar instability of left knee  M25 362    2  Patellofemoral syndrome of both knees  M22 2X1     M22 2X2    3  Sprain of medial collateral ligament of left knee, subsequent encounter  S83 412D    4  Weakness of both hips  R29 898        Start Time: 1556  Stop Time: 1700  Total time in clinic (min): 64 minutes    Assessment  Assessment details: Since beginning physical therapy, Hitesh Monroe has attended a total number of 3 visits and has maintained poor compliance with established POC  Minimal progress made to overall status since initial evaluation secondary to lack of attendance  Patient continues to present with left knee pain, swelling, crepitus, limited ROM into both flexion and extension, painful patellar mobility and antalgic gait  Patient's pain limits her ability to perform a/iadls and engage in work-related activity  Patient would continue to benefit from skilled physical therapy to address her aforementioned impairments, improve their level of function and to improve their overall quality of life  Impairments: abnormal gait, abnormal or restricted ROM, activity intolerance, impaired balance, impaired physical strength, lacks appropriate home exercise program, pain with function, weight-bearing intolerance and poor body mechanics  Understanding of Dx/Px/POC: good   Prognosis: good    Goals  STG: ALL GOALS PROGRESSING  1  Patient will report 25% decrease in pain in 4 weeks  2  Patient will demonstrate 25% improvement in ROM in 4 weeks  3  Patient will demonstrate 1/2 grade improvement in strength in 4 weeks  LTG: ALL GOALS PROGRESSING  1  Patient will be able to perform IADLS without restriction or pain by discharge  2  Patient will be independent in HEP by discharge    3  Patient will be able to return to recreational/work duties without restriction or pain by discharge  Plan  Patient would benefit from: PT eval and skilled PT  Planned modality interventions: cryotherapy, thermotherapy: hydrocollator packs and TENS  Planned therapy interventions: IADL retraining, body mechanics training, flexibility, functional ROM exercises, home exercise program, neuromuscular re-education, manual therapy, postural training, strengthening, stretching, therapeutic activities, therapeutic exercise, joint mobilization, transfer training, activity modification, behavior modification, balance/weight bearing training and balance  Frequency: 2x week  Duration in weeks: 6  Plan of Care beginning date: 2021  Plan of Care expiration date: 3/30/2021  Treatment plan discussed with: patient        Subjective Evaluation    History of Present Illness  Mechanism of injury: Patient reports no significant changes to overall status since her previous treatment session  Patient's pain continues to fluctuate, but has been worsening recently  Patient states that the back of her calf feels like tight ropes, which had been improved when taking muscle relaxants but when stopped returned  Patient's swelling in knee continues to fluctuate  Patient occasionally feels that her knee cap moves laterally beyond normal  Patient feels that her knee brace is more irritating, but she does wear it when performing strenuous activity  Patient denies experiencing tingling/numbness or instability  Patient does have occasional clicking  Per chart review, MRI right knee:   "1  Tear identified through the body of the medial meniscus exhibiting mild extrusion without flipped fragment  Associated grade 1 MCL sprain  2   Degenerative change including grade 3 cartilage loss weightbearing aspect medial femoral condyle    3   Joint effusion and Baker's cyst "  Pain  Current pain rating: 3  At best pain ratin  At worst pain ratin  Location: left knee: medial, anterior, posterior  Quality: sharp, tight  Alleviating factors: Biofreze, rest, elevation, ice/heat, muscle relaxants  Exacerbated by: walking, standing activity, negotiating steps, squatting, bending knee, lifting/carrying  Patient Goals  Patient goals for therapy: decreased edema, decreased pain, increased motion, return to work, return to [employer] activities, independence with ADLs/IADLs and increased strength          Objective     Observations   Left Knee   Positive for edema and effusion  Tenderness   Left Knee   Tenderness in the lateral joint line, lateral patella, medial joint line, medial patella and popliteal fossa  Neurological Testing     Sensation     Knee   Left Knee   Intact: light touch    Right Knee   Intact: light touch     Active Range of Motion   Left Knee   Flexion: 116 degrees with pain  Extension: 12 degrees with pain    Passive Range of Motion   Left Knee   Flexion: 119 degrees with pain  Extension: 10 degrees with pain    Strength/Myotome Testing     Left Hip   Planes of Motion   Flexion: 4+  Extension: 5  Abduction: 5    Left Knee   Flexion: 5  Extension: 5    Left Ankle/Foot   Dorsiflexion: 5    Ambulation     Ambulation: Level Surfaces   Ambulation without assistive device: independent    Observational Gait   Gait: antalgic     Functional Assessment        Comments  Bilateral squat: fair technique, weight-shift over right LE, (+) left knee pain      Flowsheet Rows      Most Recent Value   PT/OT G-Codes   Current Score  31   Projected Score  58                Precautions: standard      Manuals 2/16            Reevaluation GR                                                   Neuro Re-Ed                                                                                                        Ther Ex             Long-sitting gastroc str   3x30"            Heel slides 10x10"            Quad sets with heel prop 20x5"            Supine SLR 2x10            TKE with TB SAQ             Prone quad str  Ther Activity             Total gym: squats             Squats             FSU             FSD             Gait Training                                       Modalities                                            Access Code: [email or web address]   URL: [email or web address]   Date: 02/16/2021   Prepared by: Ryan Cedeno     Exercises  Supine Active Straight Leg Raise - 10 reps - 2 sets - 1x daily - 7x weekly  Long Sitting Quad Set with Towel Roll Under Heel - 10 reps - 2 sets - 5 seconds hold - 2x daily - 7x weekly  Long Sitting Calf Stretch with Strap - 1 reps - 3 sets - 30 seconds hold - 2x daily - 7x weekly  Supine Heel Slide with Strap - 10 reps - 2 sets - 10 seconds hold - 2x daily - 7x weekly

## 2021-02-22 ENCOUNTER — OFFICE VISIT (OUTPATIENT)
Dept: URGENT CARE | Facility: CLINIC | Age: 56
End: 2021-02-22
Payer: COMMERCIAL

## 2021-02-22 VITALS
HEART RATE: 75 BPM | HEIGHT: 63 IN | BODY MASS INDEX: 28.35 KG/M2 | OXYGEN SATURATION: 99 % | RESPIRATION RATE: 18 BRPM | WEIGHT: 160 LBS | TEMPERATURE: 97.5 F

## 2021-02-22 DIAGNOSIS — Z11.52 ENCOUNTER FOR SCREENING FOR COVID-19: ICD-10-CM

## 2021-02-22 DIAGNOSIS — J01.11 ACUTE RECURRENT FRONTAL SINUSITIS: Primary | ICD-10-CM

## 2021-02-22 DIAGNOSIS — M62.830 BACK MUSCLE SPASM: ICD-10-CM

## 2021-02-22 PROCEDURE — U0005 INFEC AGEN DETEC AMPLI PROBE: HCPCS | Performed by: PREVENTIVE MEDICINE

## 2021-02-22 PROCEDURE — 99213 OFFICE O/P EST LOW 20 MIN: CPT | Performed by: PREVENTIVE MEDICINE

## 2021-02-22 PROCEDURE — U0003 INFECTIOUS AGENT DETECTION BY NUCLEIC ACID (DNA OR RNA); SEVERE ACUTE RESPIRATORY SYNDROME CORONAVIRUS 2 (SARS-COV-2) (CORONAVIRUS DISEASE [COVID-19]), AMPLIFIED PROBE TECHNIQUE, MAKING USE OF HIGH THROUGHPUT TECHNOLOGIES AS DESCRIBED BY CMS-2020-01-R: HCPCS | Performed by: PREVENTIVE MEDICINE

## 2021-02-22 RX ORDER — CEFDINIR 300 MG/1
300 CAPSULE ORAL EVERY 12 HOURS SCHEDULED
Qty: 14 CAPSULE | Refills: 0 | Status: SHIPPED | OUTPATIENT
Start: 2021-02-22 | End: 2021-03-01

## 2021-02-22 RX ORDER — CYCLOBENZAPRINE HCL 10 MG
10 TABLET ORAL 3 TIMES DAILY PRN
Qty: 15 TABLET | Refills: 0 | Status: SHIPPED | OUTPATIENT
Start: 2021-02-22 | End: 2021-03-16 | Stop reason: SDUPTHER

## 2021-02-22 NOTE — LETTER
February 22, 2021     Patient: Gerald Solorio   YOB: 1965   Date of Visit: 2/22/2021       To Whom It May Concern: It is my medical opinion that Gerald Solorio should remain out of work until lab test result returns  If you have any questions or concerns, please don't hesitate to call           Sincerely,        Marleni Bush MD    CC: No Recipients

## 2021-02-22 NOTE — PATIENT INSTRUCTIONS
Sinusitis   AMBULATORY CARE:   Sinusitis  is inflammation or infection of your sinuses  It is most often caused by a virus  Acute sinusitis may last up to 12 weeks  Chronic sinusitis lasts longer than 12 weeks  Recurrent sinusitis means you have 4 or more times in 1 year  Common symptoms include the following:   · Fever    · Pain, pressure, redness, or swelling around the forehead, cheeks, or eyes    · Thick yellow or green discharge from your nose    · Tenderness when you touch your face over your sinuses    · Dry cough that happens mostly at night or when you lie down    · Headache and face pain that is worse when you lean forward    · Tooth pain, or pain when you chew    Seek care immediately if:   · Your eye and eyelid are red, swollen, and painful  · You cannot open your eye  · You have vision changes, such as double vision  · Your eyeball bulges out or you cannot move your eye  · You are more sleepy than normal, or you notice changes in your ability to think, move, or talk  · You have a stiff neck, a fever, or a bad headache  · You have swelling of your forehead or scalp  Contact your healthcare provider if:   · Your symptoms do not improve after 3 days  · Your symptoms do not go away after 10 days  · You have nausea and are vomiting  · Your nose is bleeding  · You have questions or concerns about your condition or care  Treatment for sinusitis:  Your symptoms may go away on their own  Your healthcare provider may recommend watchful waiting for up to 10 days before starting antibiotics  You may  need any of the following:  · Acetaminophen  decreases pain and fever  It is available without a doctor's order  Ask how much to take and how often to take it  Follow directions  Read the labels of all other medicines you are using to see if they also contain acetaminophen, or ask your doctor or pharmacist  Acetaminophen can cause liver damage if not taken correctly   Do not use more than 4 grams (4,000 milligrams) total of acetaminophen in one day  · NSAIDs , such as ibuprofen, help decrease swelling, pain, and fever  This medicine is available with or without a doctor's order  NSAIDs can cause stomach bleeding or kidney problems in certain people  If you take blood thinner medicine, always ask your healthcare provider if NSAIDs are safe for you  Always read the medicine label and follow directions  · Nasal steroid sprays  may help decrease inflammation in your nose and sinuses  · Decongestants  help reduce swelling and drain mucus in the nose and sinuses  They may help you breathe easier  · Antihistamines  help dry mucus in the nose and relieve sneezing  · Antibiotics  help treat or prevent a bacterial infection  · Take your medicine as directed  Contact your healthcare provider if you think your medicine is not helping or if you have side effects  Tell him or her if you are allergic to any medicine  Keep a list of the medicines, vitamins, and herbs you take  Include the amounts, and when and why you take them  Bring the list or the pill bottles to follow-up visits  Carry your medicine list with you in case of an emergency  Self-care:   · Rinse your sinuses  Use a sinus rinse device to rinse your nasal passages with a saline (salt water) solution or distilled water  Do not use tap water  This will help thin the mucus in your nose and rinse away pollen and dirt  It will also help reduce swelling so you can breathe normally  Ask your healthcare provider how often to do this  · Breathe in steam   Heat a bowl of water until you see steam  Lean over the bowl and make a tent over your head with a large towel  Breathe deeply for about 20 minutes  Be careful not to get too close to the steam or burn yourself  Do this 3 times a day  You can also breathe deeply when you take a hot shower  · Sleep with your head elevated    Place an extra pillow under your head before you go to sleep to help your sinuses drain  · Drink liquids as directed  Ask your healthcare provider how much liquid to drink each day and which liquids are best for you  Liquids will thin the mucus in your nose and help it drain  Avoid drinks that contain alcohol or caffeine  · Do not smoke, and avoid secondhand smoke  Nicotine and other chemicals in cigarettes and cigars can make your symptoms worse  Ask your healthcare provider for information if you currently smoke and need help to quit  E-cigarettes or smokeless tobacco still contain nicotine  Talk to your healthcare provider before you use these products  Prevent the spread of germs that cause sinusitis:  Wash your hands often with soap and water  Wash your hands after you use the bathroom, change a child's diaper, or sneeze  Wash your hands before you prepare or eat food  Follow up with your healthcare provider as directed: You may be referred to an ear, nose, and throat specialist  Write down your questions so you remember to ask them during your visits  © Copyright [address] Information is for End User's use only and may not be sold, redistributed or otherwise used for commercial purposes  All illustrations and images included in CareNotes® are the copyrighted property of [employer]  or [facility] Tyrese Garrison   The above information is an  only  It is not intended as medical advice for individual conditions or treatments  Talk to your doctor, nurse or pharmacist before following any medical regimen to see if it is safe and effective for you

## 2021-02-23 ENCOUNTER — APPOINTMENT (OUTPATIENT)
Dept: PHYSICAL THERAPY | Facility: CLINIC | Age: 56
End: 2021-02-23
Payer: COMMERCIAL

## 2021-02-23 LAB — SARS-COV-2 RNA RESP QL NAA+PROBE: NEGATIVE

## 2021-02-23 NOTE — PROGRESS NOTES
Daily Note     Today's date: 2021  Patient name: Matheus Cowan  : 1965  MRN: [id number]  Referring provider: Tamra Quezada DO  Dx: No diagnosis found  Subjective: ***      Objective: See treatment diary below      Assessment: Tolerated treatment fair  Patient demonstrated fatigue post treatment and would benefit from continued PT  Plan: Continue per plan of care  Progress treatment as tolerated  Precautions: standard      Manuals            Reevaluation GR                                                   Neuro Re-Ed                                                                                                        Ther Ex             Long-sitting gastroc str  3x30"            Heel slides 10x10"            Quad sets with heel prop 20x5"            Supine SLR 2x10            TKE with TB             SAQ             Prone quad str                            Ther Activity             Total gym: squats             Squats             FSU             FSD             Gait Training                                       Modalities

## 2021-02-24 NOTE — PROGRESS NOTES
[facility]        NAME: Laura Claudio is a 63 y o  female  : 1965    MRN: [id number]  DATE: 2021  TIME: 5:41 PM    Assessment and Plan   Acute recurrent frontal sinusitis [J01 11]  1  Acute recurrent frontal sinusitis  cefdinir (OMNICEF) 300 mg capsule   2  Encounter for screening for COVID-19  Novel Coronavirus (Covid-19),PCR [facility] - Collected at [facility]         Patient Instructions       Follow up with PCP in 3-5 days  Proceed to  ER if symptoms worsen  Chief Complaint     Chief Complaint   Patient presents with    Nasal Congestion     Pt c/o nasal congestion, head congestion, sinus pressure, post nasal drip and scratchy throat that started 2 days ago  History of Present Illness       Sinusitis  This is a new problem  The current episode started in the past 7 days  The problem is unchanged  There has been no fever  Her pain is at a severity of 4/10  The pain is moderate  Associated symptoms include congestion, sinus pressure and sneezing  Past treatments include nothing  The treatment provided no relief  Review of Systems   Review of Systems   Constitutional: Negative  HENT: Positive for congestion, sinus pressure and sneezing  Respiratory: Negative  Cardiovascular: Negative  All other systems reviewed and are negative          Current Medications       Current Outpatient Medications:     diclofenac sodium (VOLTAREN) 50 mg EC tablet, Take 1 tablet (50 mg total) by mouth 2 (two) times a day, Disp: 60 tablet, Rfl: 0    Famotidine (PEPCID AC MAXIMUM STRENGTH PO), , Disp: , Rfl:     nicotine (NICODERM CQ) 14 mg/24hr TD 24 hr patch, Place 1 patch on the skin every 24 hours, Disp: 28 patch, Rfl: 5    cefdinir (OMNICEF) 300 mg capsule, Take 1 capsule (300 mg total) by mouth every 12 (twelve) hours for 7 days, Disp: 14 capsule, Rfl: 0    cyclobenzaprine (FLEXERIL) 10 mg tablet, Take 1 tablet (10 mg total) by mouth 3 (three) times a day as needed for muscle spasms for up to 5 days, Disp: 15 tablet, Rfl: 0    ibuprofen (MOTRIN) 800 mg tablet, Take 1 tablet (800 mg total) by mouth every 8 (eight) hours as needed for moderate pain (Patient not taking: Reported on 2020), Disp: 90 tablet, Rfl: 0    Current Allergies     Allergies as of 2021 - Reviewed 2021   Allergen Reaction Noted    Bactrim [sulfamethoxazole-trimethoprim] GI Intolerance 2017    Ciprofloxacin GI Intolerance 2015    Erythromycin GI Intolerance 2017    Erythromycin base  2015    Gluten meal  2019    Lactose  2019    Levaquin [levofloxacin] GI Intolerance 2017    Penicillins  2014    Tetracycline  2019    Valtrex [valacyclovir] Other (See Comments) 2017    Keflex [cephalexin] Swelling 2018            The following portions of the patient's history were reviewed and updated as appropriate: allergies, current medications, past family history, past medical history, past social history, past surgical history and problem list      Past Medical History:   Diagnosis Date    Choledocholithiasis 2017    Colon polyp     GERD (gastroesophageal reflux disease)        Past Surgical History:   Procedure Laterality Date     SECTION      CHOLECYSTECTOMY      CHOLECYSTECTOMY LAPAROSCOPIC N/A 2017    Procedure: CHOLECYSTECTOMY LAPAROSCOPIC, with IOC, possible open;  Surgeon: Zulma Edwards MD;  Location: [facility];  Service: General    DENTAL SURGERY      ERCP N/A 2017    Procedure: ENDOSCOPIC RETROGRADE CHOLANGIOPANCREATOGRAPHY (ERCP);   Surgeon: Michael Clark MD;  Location: [facility];  Service: Gastroenterology       Family History   Problem Relation Age of Onset    Hodgkin's lymphoma Mother     Cancer Father     Hypertension Father     Diabetes Father         of other type wth microalbuminuria, with long-term current use of insulin         Medications have been verified  Objective   Pulse 75   Temp 97 5 °F (36 4 °C)   Resp 18   Ht 5' 3" (1 6 m)   Wt 72 6 kg (160 lb)   SpO2 99%   BMI 28 34 kg/m²        Physical Exam     Physical Exam  Vitals signs reviewed  Constitutional:       Appearance: Normal appearance  She is well-developed  HENT:      Head: Normocephalic and atraumatic  Right Ear: Tympanic membrane normal       Left Ear: Tympanic membrane normal       Nose: Mucosal edema, congestion and rhinorrhea present  Right Sinus: Maxillary sinus tenderness and frontal sinus tenderness present  Left Sinus: Maxillary sinus tenderness and frontal sinus tenderness present  Eyes:      Pupils: Pupils are equal, round, and reactive to light  Cardiovascular:      Rate and Rhythm: Normal rate and regular rhythm  Pulmonary:      Effort: Pulmonary effort is normal       Breath sounds: Normal breath sounds  Abdominal:      Palpations: Abdomen is soft  Neurological:      Mental Status: She is alert

## 2021-02-25 ENCOUNTER — OFFICE VISIT (OUTPATIENT)
Dept: PHYSICAL THERAPY | Facility: CLINIC | Age: 56
End: 2021-02-25
Payer: COMMERCIAL

## 2021-02-25 DIAGNOSIS — S83.412D SPRAIN OF MEDIAL COLLATERAL LIGAMENT OF LEFT KNEE, SUBSEQUENT ENCOUNTER: ICD-10-CM

## 2021-02-25 DIAGNOSIS — M25.362 PATELLAR INSTABILITY OF LEFT KNEE: Primary | ICD-10-CM

## 2021-02-25 DIAGNOSIS — M22.2X2 PATELLOFEMORAL SYNDROME OF BOTH KNEES: ICD-10-CM

## 2021-02-25 DIAGNOSIS — R29.898 WEAKNESS OF BOTH HIPS: ICD-10-CM

## 2021-02-25 DIAGNOSIS — M22.2X1 PATELLOFEMORAL SYNDROME OF BOTH KNEES: ICD-10-CM

## 2021-02-25 PROCEDURE — 97530 THERAPEUTIC ACTIVITIES: CPT

## 2021-02-25 PROCEDURE — 97110 THERAPEUTIC EXERCISES: CPT

## 2021-02-25 NOTE — PROGRESS NOTES
Daily Note     Today's date: 2021  Patient name: Julita Grant  : 1965  MRN: [id number]  Referring provider: Dorathy Shone, DO  Dx:   Encounter Diagnosis     ICD-10-CM    1  Patellar instability of left knee  M25 362    2  Patellofemoral syndrome of both knees  M22 2X1     M22 2X2    3  Sprain of medial collateral ligament of left knee, subsequent encounter  S83 412D    4  Weakness of both hips  R29 898        Start Time: 1515  Stop Time: 1558  Total time in clinic (min): 43 minutes    Subjective: Pt reported still having some pain and reported that she feels like the swelling is prohibiting her motion into knee flexion  Pt noted that after last time her knee really swelled up so she is fearful that it is going to happen again  Objective: See treatment diary below      Assessment:  Continued with treatment session, some little ache and discomfort with some of the progressions noted  No increase in pain noted with steps or step downs today  Pt noted her stairs at home are taller than 6"  Tolerated treatment fair  Patient demonstrated fatigue post treatment and would benefit from continued PT  Educated patient on DOMS and included an updated HEP for at home to continue to progress herself as well  S/p treatment session no increase in pain noted  Plan: Continue per plan of care  Progress treatment as tolerated  Precautions: standard      Manuals           Reevaluation GR                                                   Neuro Re-Ed                                                                                                        Ther Ex             Long-sitting gastroc str  3x30"            Heel slides 10x10"  30x 5" hold           Quad sets with heel prop 20x5"  20x 5"           Supine SLR 2x10  3" hold 2x 10 eccentric contol           TKE with TB             SAQ   20x           Prone quad str     30" x 3           Bike              Ther Activity Total gym: squats             Squats   10x           FSU   10x 6"           FSD   10 x 6"           Gait Training                                       Modalities                                       1 on 1 time for 38 min on 2/25/21    Access Code: [email or web address]   URL: [email or web address]   Date: 02/25/2021   Prepared by: Yifan Vaughan     Exercises  Prone Quadriceps Stretch with Strap - 30 hold - 1x daily - 7x weekly  Supine Knee Extension Strengthening - 10 reps - 3 sets - 1x daily - 7x weekly  Forward Step Up with Counter Support - 10 reps - 3 sets - 1x daily                            - 7x weekly  Forward Step Down - 10 reps - 3 sets - 1x daily - 7x weekly  Mini Squat with Counter Support - 10 reps - 3 sets - 1x daily - 7x weekly

## 2021-03-02 ENCOUNTER — APPOINTMENT (OUTPATIENT)
Dept: PHYSICAL THERAPY | Facility: CLINIC | Age: 56
End: 2021-03-02
Payer: COMMERCIAL

## 2021-03-02 NOTE — PROGRESS NOTES
Daily Note     Today's date: 3/2/2021  Patient name: Jenn Cedeno  : 1965  MRN: [id number]  Referring provider: Zandra Ferguson DO  Dx:   Encounter Diagnosis     ICD-10-CM    1  Patellar instability of left knee  M25 362    2  Patellofemoral syndrome of both knees  M22 2X1     M22 2X2    3  Sprain of medial collateral ligament of left knee, subsequent encounter  S83 412D    4  Weakness of both hips  R29 898                   Subjective: ***      Objective: See treatment diary below      Assessment: Tolerated treatment well  Patient demonstrated fatigue post treatment and would benefit from continued PT  Plan: Continue per plan of care  Progress treatment as tolerated  Precautions: standard      Manuals 2/16 2/23 2/25 3/2         Reevaluation GR                                                   Neuro Re-Ed                                                                                                        Ther Ex             Long-sitting gastroc str  3x30"            Heel slides 10x10"  30x 5" hold           Quad sets with heel prop 20x5"  20x 5"           Supine SLR 2x10  3" hold 2x 10 eccentric contol           TKE with TB             SAQ   20x           Prone quad str     30" x 3           Bike              Ther Activity             Total gym: squats             Squats   10x           FSU   10x 6"           FSD   10 x 6"           Gait Training                                       Modalities                                       1 on 1 time for 38 min on 21

## 2021-03-16 ENCOUNTER — OFFICE VISIT (OUTPATIENT)
Dept: PHYSICAL THERAPY | Facility: CLINIC | Age: 56
End: 2021-03-16
Payer: COMMERCIAL

## 2021-03-16 DIAGNOSIS — R29.898 WEAKNESS OF BOTH HIPS: ICD-10-CM

## 2021-03-16 DIAGNOSIS — M62.830 BACK MUSCLE SPASM: ICD-10-CM

## 2021-03-16 DIAGNOSIS — S83.412D SPRAIN OF MEDIAL COLLATERAL LIGAMENT OF LEFT KNEE, SUBSEQUENT ENCOUNTER: ICD-10-CM

## 2021-03-16 DIAGNOSIS — M25.362 PATELLAR INSTABILITY OF LEFT KNEE: Primary | ICD-10-CM

## 2021-03-16 DIAGNOSIS — M22.2X1 PATELLOFEMORAL SYNDROME OF BOTH KNEES: ICD-10-CM

## 2021-03-16 DIAGNOSIS — M22.2X2 PATELLOFEMORAL SYNDROME OF BOTH KNEES: ICD-10-CM

## 2021-03-16 PROCEDURE — 97110 THERAPEUTIC EXERCISES: CPT | Performed by: PHYSICAL THERAPIST

## 2021-03-16 PROCEDURE — 97112 NEUROMUSCULAR REEDUCATION: CPT | Performed by: PHYSICAL THERAPIST

## 2021-03-16 RX ORDER — CYCLOBENZAPRINE HCL 10 MG
10 TABLET ORAL 3 TIMES DAILY PRN
Qty: 15 TABLET | Refills: 0 | OUTPATIENT
Start: 2021-03-16 | End: 2021-03-21

## 2021-03-16 RX ORDER — CYCLOBENZAPRINE HCL 10 MG
10 TABLET ORAL 3 TIMES DAILY PRN
Qty: 15 TABLET | Refills: 0 | Status: SHIPPED | OUTPATIENT
Start: 2021-03-16 | End: 2021-04-19 | Stop reason: SDUPTHER

## 2021-03-16 NOTE — PROGRESS NOTES
Daily Note     Today's date: 3/16/2021  Patient name: Luci May  : 1965  MRN: [id number]  Referring provider: Jacqui Grajeda DO  Dx:   Encounter Diagnosis     ICD-10-CM    1  Patellar instability of left knee  M25 362    2  Patellofemoral syndrome of both knees  M22 2X1     M22 2X2    3  Sprain of medial collateral ligament of left knee, subsequent encounter  S83 412D    4  Weakness of both hips  R29 898                   Subjective: Patient reports she has been feeling better recently  She has a follow up on Thursday ( 3/18) with the dr  She mentions she has been taking a muscle relaxer before bedtime as well as drinking cherry juice and it has seemed to help tremendously  She does not feel the constant tightness she has felt in the past     Objective: See treatment diary below      Assessment: Patient tolerated treatment well today  She had some discomfort with SAQ but able to complete the intervention  She was able to complete program as noted below with good technique and minimal cues required  Ended the session on the bike with no resistance to work on mobility and ROM  She had some discomfort at the start but by minute 4 she demonstrates improvements in her ability to complete full revolutions without pain  Plan: Continue per plan of care  Precautions: standard      Manuals 2/16 2/23 2/25 3/16         Reevaluation GR                                                   Neuro Re-Ed                                                                                                        Ther Ex             Long-sitting gastroc str  3x30"   3x30"         Heel slides 10x10"  30x 5" hold  30x   5" hold         Quad sets with heel prop 20x5"  20x 5"  30x  5" hold         Supine SLR 2x10  3" hold 2x 10 eccentric contol  3" hold 2x10 eccentric contol          TKE with TB             SAQ   20x  20x         Prone quad str     30" x 3  30"x3         Bike     10 min ROM         Ther Activity Total gym: squats             Squats   10x  15x         FSU   10x 6"  15x 6"          FSD   10 x 6"  15x 6"         Gait Training                                       Modalities

## 2021-03-18 ENCOUNTER — OFFICE VISIT (OUTPATIENT)
Dept: OBGYN CLINIC | Facility: CLINIC | Age: 56
End: 2021-03-18
Payer: COMMERCIAL

## 2021-03-18 VITALS
SYSTOLIC BLOOD PRESSURE: 150 MMHG | BODY MASS INDEX: 29.77 KG/M2 | HEIGHT: 63 IN | DIASTOLIC BLOOD PRESSURE: 86 MMHG | WEIGHT: 168 LBS | HEART RATE: 91 BPM

## 2021-03-18 DIAGNOSIS — M17.12 PRIMARY OSTEOARTHRITIS OF LEFT KNEE: Primary | ICD-10-CM

## 2021-03-18 DIAGNOSIS — S83.242D ACUTE MEDIAL MENISCUS TEAR OF LEFT KNEE, SUBSEQUENT ENCOUNTER: ICD-10-CM

## 2021-03-18 PROCEDURE — 3008F BODY MASS INDEX DOCD: CPT | Performed by: FAMILY MEDICINE

## 2021-03-18 PROCEDURE — 1036F TOBACCO NON-USER: CPT | Performed by: FAMILY MEDICINE

## 2021-03-18 PROCEDURE — 20610 DRAIN/INJ JOINT/BURSA W/O US: CPT | Performed by: FAMILY MEDICINE

## 2021-03-18 PROCEDURE — 99213 OFFICE O/P EST LOW 20 MIN: CPT | Performed by: FAMILY MEDICINE

## 2021-03-18 RX ORDER — LIDOCAINE HYDROCHLORIDE 10 MG/ML
5 INJECTION, SOLUTION INFILTRATION; PERINEURAL
Status: COMPLETED | OUTPATIENT
Start: 2021-03-18 | End: 2021-03-18

## 2021-03-18 RX ORDER — TRIAMCINOLONE ACETONIDE 40 MG/ML
40 INJECTION, SUSPENSION INTRA-ARTICULAR; INTRAMUSCULAR
Status: COMPLETED | OUTPATIENT
Start: 2021-03-18 | End: 2021-03-18

## 2021-03-18 RX ORDER — LIDOCAINE HYDROCHLORIDE 10 MG/ML
4 INJECTION, SOLUTION INFILTRATION; PERINEURAL
Status: COMPLETED | OUTPATIENT
Start: 2021-03-18 | End: 2021-03-18

## 2021-03-18 RX ADMIN — LIDOCAINE HYDROCHLORIDE 5 ML: 10 INJECTION, SOLUTION INFILTRATION; PERINEURAL at 12:28

## 2021-03-18 RX ADMIN — TRIAMCINOLONE ACETONIDE 40 MG: 40 INJECTION, SUSPENSION INTRA-ARTICULAR; INTRAMUSCULAR at 12:28

## 2021-03-18 RX ADMIN — LIDOCAINE HYDROCHLORIDE 4 ML: 10 INJECTION, SOLUTION INFILTRATION; PERINEURAL at 12:28

## 2021-03-18 NOTE — PROGRESS NOTES
Assessment/Plan:  Assessment/Plan   Diagnoses and all orders for this visit:    Primary osteoarthritis of left knee  -     Large joint arthrocentesis: L knee  -     lidocaine (XYLOCAINE) 1 % injection 4 mL  -     lidocaine (XYLOCAINE) 1 % injection 5 mL  -     triamcinolone acetonide (KENALOG-40) 40 mg/mL injection 40 mg    Acute medial meniscus tear of left knee, subsequent encounter  -     Large joint arthrocentesis: L knee  -     lidocaine (XYLOCAINE) 1 % injection 4 mL  -     lidocaine (XYLOCAINE) 1 % injection 5 mL  -     triamcinolone acetonide (KENALOG-40) 40 mg/mL injection 40 mg        25-year-old active female with osteoarthritis of left knee with left knee pain and swelling of more than 6 months duration  Discussed with patient physical exam, impression and plan  Physical exam of left knee is noted for effusion  She has mild tenderness at medial and lateral joint line  She has range of motion limited extension of -5° and flexion to 120°  There is laxity with valgus stress  Clinical impression that she is symptomatic from pathology within the knee  I offered treatment in form of aspiration and corticosteroid injection to which she agreed  I aspirated 20 cc of yellow fluid and administered mixture of 4 cc 1% lidocaine and 1 cc Kenalog to left knee without complication  She is advised to complete course of physical therapy and then to continue with home exercise program   She will follow up with me in 5 weeks at which point she will be re-evaluated  Subjective:   Patient ID: Sunita Snyder is a 63 y o  female  Chief Complaint   Patient presents with    Left Knee - Follow-up        25-year-old active female with osteoarthritis left knee following up for left knee pain and swelling of more than 6 months duration  She was last seen by me more than 2 months ago at which point she was advised to continue with topical Voltaren gel and taking supplements    She was provided with [facility] knee brace  She was advised to continue with physical therapy and doing home exercises  She reports having pain described as generalized to the knee but worse at the anterior and posterior aspects, achy and throbbing, non radiating, worse with bearing weight and ambulating, associated with swelling, associated limited range of motion and improved with resting  She has difficulty with fully extending and fully bending the knee  She has been applying topical Voltaren gel and topical CBD  Knee Pain  This is a chronic problem  The current episode started more than 1 month ago  The problem occurs daily  The problem has been waxing and waning  Associated symptoms include arthralgias and joint swelling  Pertinent negatives include no numbness or weakness  The symptoms are aggravated by standing, walking, twisting and bending  She has tried rest and NSAIDs (Physical therapy, home exercise,  brace) for the symptoms  The treatment provided mild relief  Review of Systems   Musculoskeletal: Positive for arthralgias and joint swelling  Neurological: Negative for weakness and numbness  Objective:  Vitals:    03/18/21 1141   BP: 150/86   Pulse: 91   Weight: 76 2 kg (168 lb)   Height: 5' 3" (1 6 m)     Left Knee Exam     Muscle Strength   The patient has normal left knee strength  Tenderness   The patient is experiencing tenderness in the lateral joint line and medial joint line  Range of Motion   Extension: -5   Flexion: 120     Tests   Varus: negative Valgus: positive (Laxity, no pain)    Other   Effusion: effusion present          Observations   Left Knee   Positive for effusion  Physical Exam  Vitals signs and nursing note reviewed  Constitutional:       General: She is not in acute distress  Appearance: She is well-developed  HENT:      Head: Normocephalic        Right Ear: External ear normal       Left Ear: External ear normal    Eyes:      Conjunctiva/sclera: Conjunctivae normal    Neck:      Trachea: No tracheal deviation  Cardiovascular:      Rate and Rhythm: Normal rate  Pulmonary:      Effort: Pulmonary effort is normal  No respiratory distress  Abdominal:      General: There is no distension  Musculoskeletal:      Left knee: She exhibits effusion  Skin:     General: Skin is warm and dry  Neurological:      Mental Status: She is alert and oriented to person, place, and time  Psychiatric:         Behavior: Behavior normal          Large joint arthrocentesis: L knee  Universal Protocol:  Consent: Verbal consent obtained  Risks and benefits: risks, benefits and alternatives were discussed  Consent given by: patient  Time out: Immediately prior to procedure a "time out" was called to verify the correct patient, procedure, equipment, support staff and site/side marked as required  Timeout called at: 3/18/2021 12:10 PM   Patient understanding: patient states understanding of the procedure being performed  Patient consent: the patient's understanding of the procedure matches consent given  Procedure consent: procedure consent matches procedure scheduled  Relevant documents: relevant documents present and verified  Test results: test results available and properly labeled  Site marked: the operative site was marked  Radiology Images displayed and confirmed   If images not available, report reviewed: imaging studies available  Required items: required blood products, implants, devices, and special equipment available  Patient identity confirmed: verbally with patient    Supporting Documentation  Indications: pain   Procedure Details  Location: knee - L knee  Preparation: Patient was prepped and draped in the usual sterile fashion  Needle size: 18 G  Ultrasound guidance: no  Approach: superior  Medications administered: 5 mL lidocaine 1 %; 4 mL lidocaine 1 %; 40 mg triamcinolone acetonide 40 mg/mL    Aspirate amount: 20 mL  Aspirate: yellow    Patient tolerance: patient tolerated the procedure well with no immediate complications  Dressing:  Sterile dressing applied

## 2021-03-23 ENCOUNTER — APPOINTMENT (OUTPATIENT)
Dept: PHYSICAL THERAPY | Facility: CLINIC | Age: 56
End: 2021-03-23
Payer: COMMERCIAL

## 2021-04-07 ENCOUNTER — EVALUATION (OUTPATIENT)
Dept: PHYSICAL THERAPY | Facility: CLINIC | Age: 56
End: 2021-04-07
Payer: COMMERCIAL

## 2021-04-07 DIAGNOSIS — R29.898 WEAKNESS OF BOTH HIPS: ICD-10-CM

## 2021-04-07 DIAGNOSIS — M25.362 PATELLAR INSTABILITY OF LEFT KNEE: Primary | ICD-10-CM

## 2021-04-07 DIAGNOSIS — M22.2X2 PATELLOFEMORAL SYNDROME OF BOTH KNEES: ICD-10-CM

## 2021-04-07 DIAGNOSIS — M22.2X1 PATELLOFEMORAL SYNDROME OF BOTH KNEES: ICD-10-CM

## 2021-04-07 DIAGNOSIS — S83.412D SPRAIN OF MEDIAL COLLATERAL LIGAMENT OF LEFT KNEE, SUBSEQUENT ENCOUNTER: ICD-10-CM

## 2021-04-07 PROCEDURE — 97140 MANUAL THERAPY 1/> REGIONS: CPT | Performed by: PHYSICAL THERAPIST

## 2021-04-07 PROCEDURE — 97110 THERAPEUTIC EXERCISES: CPT | Performed by: PHYSICAL THERAPIST

## 2021-04-07 NOTE — PROGRESS NOTES
PT Re-Evaluation     Today's date: 2021  Patient name: Madleine Franco  : 1965  MRN: [id number]  Referring provider: Kaycee Redd DO  Dx:   Encounter Diagnosis     ICD-10-CM    1  Patellar instability of left knee  M25 362    2  Patellofemoral syndrome of both knees  M22 2X1     M22 2X2    3  Sprain of medial collateral ligament of left knee, subsequent encounter  S83 412D    4  Weakness of both hips  R29 898        Start Time: 1600  Stop Time: 1635  Total time in clinic (min): 35 minutes    Assessment  Assessment details: Since beginning physical therapy, Gregorio Siddiqi has attended a total number of 6 visits and has maintained poor compliance with established POC  Despite poor attendance rate, Patient has made gradual progress towards goals, partially attributed to recent aspiration and steroid injection  Patient reporting decreased average pain, improved knee ROM, improved ambulatory tolerance and ability to negotiate steps  Patient does continue to present with painful patellofemoral joint mobility and limited weight-bearing tolerance  Patient is fearful of riding her motorcycle and carrying heavy weight d/t presence of pain and weakness in LE  Given improved attendance rate, Patient would continue to benefit from skilled physical therapy to address her aforementioned impairments, improve their level of function and to improve their overall quality of life  Impairments: abnormal gait, abnormal or restricted ROM, activity intolerance, impaired balance, impaired physical strength, lacks appropriate home exercise program, pain with function, weight-bearing intolerance and poor body mechanics  Understanding of Dx/Px/POC: excellent   Prognosis: good    Goals  ST  Patient will report 25% decrease in pain in 4 weeks  MET  2  Patient will demonstrate 25% improvement in ROM in 4 weeks  MET  3  Patient will demonstrate 1/2 grade improvement in strength in 4 weeks   MET    LTG: ALL GOALS PROGRESSING  1  Patient will be able to perform IADLS without restriction or pain by discharge  2  Patient will be independent in HEP by discharge  3  Patient will be able to return to recreational/work duties without restriction or pain by discharge  Plan  Patient would benefit from: PT eval and skilled PT  Planned modality interventions: cryotherapy, thermotherapy: hydrocollator packs and TENS  Planned therapy interventions: IADL retraining, body mechanics training, flexibility, functional ROM exercises, home exercise program, neuromuscular re-education, manual therapy, postural training, strengthening, stretching, therapeutic activities, therapeutic exercise, joint mobilization, transfer training, activity modification, behavior modification, balance/weight bearing training and balance  Frequency: 2x week  Duration in weeks: 6  Plan of Care beginning date: 2021  Plan of Care expiration date: 2021  Treatment plan discussed with: patient        Subjective Evaluation    History of Present Illness  Mechanism of injury: Patient underwent an aspiration and steroid injection to her knee several weeks ago and since then her pain has been significantly less and she has had greater ROM  Patient's knee was feeling normal, until she was negotiating steps and felt severe pain over her knee medially, however, it has since dissipated  Patient's pain remains interrupted secondary to pain  Patient is now able to negotiate steps with greater ease  Patient does continue to have pain if she is on her feet for too long  Patient was able to mount her motorcycle, but was leery when she dismounted d/t the pressure on her leg  Patient continues to be unable to carry 50 lb buckets  Patient continues to be unable to kneel on her left knee    Patient estimates her left knee overall level of function to be approximately 50% of her contralateral    Pain  Current pain ratin  At best pain ratin  At worst pain rating: 3  Location: left knee: medially  Quality: dull ache    Treatments  Current treatment: physical therapy  Patient Goals  Patient goals for therapy: decreased edema, decreased pain, increased motion, return to work, return to [employer] activities, independence with ADLs/IADLs and increased strength          Objective     Tenderness   Left Knee   Tenderness in the lateral patella and medial patella  No tenderness in the lateral joint line, medial joint line and popliteal fossa  Neurological Testing     Sensation     Knee   Left Knee   Intact: light touch    Right Knee   Intact: light touch     Active Range of Motion   Left Knee   Flexion: 126 degrees   Extension: 4 degrees     Passive Range of Motion   Left Knee   Flexion: 128 degrees   Extension: 2 degrees     Mobility   Patellar Mobility:   Left Knee   WFL: medial, lateral, superior and inferior  Patellar Mobility Comments   Left superior tendon comments: (+) pain, crepitus  Left inferior tendon comments: (+) pain, crepitus  Strength/Myotome Testing     Left Hip   Planes of Motion   Flexion: 5  Extension: 5  Abduction: 5    Left Knee   Flexion: 5  Extension: 5    Left Ankle/Foot   Dorsiflexion: 5    Ambulation     Ambulation: Level Surfaces   Ambulation without assistive device: independent    Observational Gait   Gait: within functional limits     Functional Assessment        Comments  Bilateral squat: good technique  FSU 8" step: Independent  FSD 8" step: Independent, fair eccentric control, minor pain             Precautions: standard      Manuals 2/16 2/23 2/25 3/16 4/7        Reevaluation GR    GR                                               Neuro Re-Ed                                                                                                        Ther Ex             Long-sitting gastroc str   3x30"   3x30"         Heel slides 10x10"  30x 5" hold  30x   5" hold         Quad sets with heel prop 20x5"  20x 5"  30x  5" hold         Supine SLR 2x10  3" hold 2x 10 eccentric contol  3" hold 2x10 eccentric contol  With TB        TKE with TB             SAQ   20x  20x         Prone quad str     30" x 3  30"x3         Bike     10 min ROM         Standing hip abd, ext with TB     NV        Ther Activity             Total gym: squats             Squats   10x  15x         FSU   10x 6"  15x 6"          FSD   10 x 6"  15x 6"         LSD     NV        Stationary lunges     NV        Gait Training                                       Modalities

## 2021-04-08 ENCOUNTER — OFFICE VISIT (OUTPATIENT)
Dept: URGENT CARE | Facility: CLINIC | Age: 56
End: 2021-04-08
Payer: COMMERCIAL

## 2021-04-08 VITALS
RESPIRATION RATE: 18 BRPM | SYSTOLIC BLOOD PRESSURE: 157 MMHG | OXYGEN SATURATION: 96 % | TEMPERATURE: 98 F | HEART RATE: 103 BPM | DIASTOLIC BLOOD PRESSURE: 76 MMHG

## 2021-04-08 DIAGNOSIS — R30.0 DYSURIA: Primary | ICD-10-CM

## 2021-04-08 LAB
SL AMB  POCT GLUCOSE, UA: ABNORMAL
SL AMB LEUKOCYTE ESTERASE,UA: ABNORMAL
SL AMB POCT BILIRUBIN,UA: ABNORMAL
SL AMB POCT BLOOD,UA: ABNORMAL
SL AMB POCT CLARITY,UA: CLEAR
SL AMB POCT COLOR,UA: YELLOW
SL AMB POCT KETONES,UA: ABNORMAL
SL AMB POCT NITRITE,UA: ABNORMAL
SL AMB POCT PH,UA: 5
SL AMB POCT SPECIFIC GRAVITY,UA: 1.02
SL AMB POCT URINE PROTEIN: ABNORMAL
SL AMB POCT UROBILINOGEN: 0.2

## 2021-04-08 PROCEDURE — 81002 URINALYSIS NONAUTO W/O SCOPE: CPT | Performed by: PHYSICIAN ASSISTANT

## 2021-04-08 PROCEDURE — 87086 URINE CULTURE/COLONY COUNT: CPT | Performed by: PHYSICIAN ASSISTANT

## 2021-04-08 PROCEDURE — 99213 OFFICE O/P EST LOW 20 MIN: CPT | Performed by: PHYSICIAN ASSISTANT

## 2021-04-08 RX ORDER — NITROFURANTOIN 25; 75 MG/1; MG/1
100 CAPSULE ORAL 2 TIMES DAILY
Qty: 14 CAPSULE | Refills: 0 | Status: SHIPPED | OUTPATIENT
Start: 2021-04-08 | End: 2021-04-15

## 2021-04-08 NOTE — PATIENT INSTRUCTIONS
Urinary Tract Infection in Women   WHAT YOU NEED TO KNOW:   A urinary tract infection (UTI) is caused by bacteria that get inside your urinary tract  Most bacteria that enter your urinary tract come out when you urinate  If the bacteria stay in your urinary tract, you may get an infection  Your urinary tract includes your kidneys, ureters, bladder, and urethra  Urine is made in your kidneys, and it flows from the ureters to the bladder  Urine leaves the bladder through the urethra  A UTI is more common in your lower urinary tract, which includes your bladder and urethra  DISCHARGE INSTRUCTIONS:   Return to the emergency department if:   · You are urinating very little or not at all  · You have a high fever with shaking chills  · You have side or back pain that gets worse  Call your doctor if:   · You have a fever  · You do not feel better after 2 days of taking antibiotics  · You are vomiting  · You have questions or concerns about your condition or care  Medicines:   · Antibiotics  help fight a bacterial infection  If you have UTIs often (called recurrent UTIs), you may be given antibiotics to take regularly  You will be given directions for when and how to use antibiotics  The goal is to prevent UTIs but not cause antibiotic resistance by using antibiotics too often  · Medicines  may be given to decrease pain and burning when you urinate  They will also help decrease the feeling that you need to urinate often  These medicines will make your urine orange or red  · Take your medicine as directed  Contact your healthcare provider if you think your medicine is not helping or if you have side effects  Tell him or her if you are allergic to any medicine  Keep a list of the medicines, vitamins, and herbs you take  Include the amounts, and when and why you take them  Bring the list or the pill bottles to follow-up visits   Carry your medicine list with you in case of an emergency  Follow up with your healthcare provider as directed:  Write down your questions so you remember to ask them during your visits  Prevent another UTI:   · Empty your bladder often  Urinate and empty your bladder as soon as you feel the need  Do not hold your urine for long periods of time  · Wipe from front to back after you urinate or have a bowel movement  This will help prevent germs from getting into your urinary tract through your urethra  · Drink liquids as directed  Ask how much liquid to drink each day and which liquids are best for you  You may need to drink more liquids than usual to help flush out the bacteria  Do not drink alcohol, caffeine, or citrus juices  These can irritate your bladder and increase your symptoms  Your healthcare provider may recommend cranberry juice to help prevent a UTI  · Urinate after you have sex  This can help flush out bacteria passed during sex  · Do not douche or use feminine deodorants  These can change the chemical balance in your vagina  · Change sanitary pads or tampons often  This will help prevent germs from getting into your urinary tract  · Talk to your healthcare provider about your birth control method  You may need to change your method if it is increasing your risk for UTIs  · Wear cotton underwear and clothes that are loose  Tight pants and nylon underwear can trap moisture and cause bacteria to grow  · Vaginal estrogen may be recommended  This medicine helps prevent UTIs in women who have gone through menopause or are in larry-menopause  · Do pelvic muscle exercises often  Pelvic muscle exercises may help you start and stop urinating  Strong pelvic muscles may help you empty your bladder easier  Squeeze these muscles tightly for 5 seconds like you are trying to hold back urine  Then relax for 5 seconds  Gradually work up to squeezing for 10 seconds  Do 3 sets of 15 repetitions a day, or as directed      © Copyright [address] Information is for [employer] use only and may not be sold, redistributed or otherwise used for commercial purposes  All illustrations and images included in CareNotes® are the copyrighted property of [employer]  or Jayleen Angel  The above information is an  only  It is not intended as medical advice for individual conditions or treatments  Talk to your doctor, nurse or pharmacist before following any medical regimen to see if it is safe and effective for you

## 2021-04-08 NOTE — PROGRESS NOTES
[facility]        NAME: Juhi Moss is a 63 y o  female  : 1965    MRN: [id number]  DATE: 2021  TIME: 4:15 PM    Assessment and Plan   Dysuria [R30 0]  1  Dysuria  nitrofurantoin (MACROBID) 100 mg capsule         Patient Instructions   Patient Instructions     Urinary Tract Infection in Women   WHAT YOU NEED TO KNOW:   A urinary tract infection (UTI) is caused by bacteria that get inside your urinary tract  Most bacteria that enter your urinary tract come out when you urinate  If the bacteria stay in your urinary tract, you may get an infection  Your urinary tract includes your kidneys, ureters, bladder, and urethra  Urine is made in your kidneys, and it flows from the ureters to the bladder  Urine leaves the bladder through the urethra  A UTI is more common in your lower urinary tract, which includes your bladder and urethra  DISCHARGE INSTRUCTIONS:   Return to the emergency department if:   · You are urinating very little or not at all  · You have a high fever with shaking chills  · You have side or back pain that gets worse  Call your doctor if:   · You have a fever  · You do not feel better after 2 days of taking antibiotics  · You are vomiting  · You have questions or concerns about your condition or care  Medicines:   · Antibiotics  help fight a bacterial infection  If you have UTIs often (called recurrent UTIs), you may be given antibiotics to take regularly  You will be given directions for when and how to use antibiotics  The goal is to prevent UTIs but not cause antibiotic resistance by using antibiotics too often  · Medicines  may be given to decrease pain and burning when you urinate  They will also help decrease the feeling that you need to urinate often  These medicines will make your urine orange or red  · Take your medicine as directed    Contact your healthcare provider if you think your medicine is not helping or if you have side effects  Tell him or her if you are allergic to any medicine  Keep a list of the medicines, vitamins, and herbs you take  Include the amounts, and when and why you take them  Bring the list or the pill bottles to follow-up visits  Carry your medicine list with you in case of an emergency  Follow up with your healthcare provider as directed:  Write down your questions so you remember to ask them during your visits  Prevent another UTI:   · Empty your bladder often  Urinate and empty your bladder as soon as you feel the need  Do not hold your urine for long periods of time  · Wipe from front to back after you urinate or have a bowel movement  This will help prevent germs from getting into your urinary tract through your urethra  · Drink liquids as directed  Ask how much liquid to drink each day and which liquids are best for you  You may need to drink more liquids than usual to help flush out the bacteria  Do not drink alcohol, caffeine, or citrus juices  These can irritate your bladder and increase your symptoms  Your healthcare provider may recommend cranberry juice to help prevent a UTI  · Urinate after you have sex  This can help flush out bacteria passed during sex  · Do not douche or use feminine deodorants  These can change the chemical balance in your vagina  · Change sanitary pads or tampons often  This will help prevent germs from getting into your urinary tract  · Talk to your healthcare provider about your birth control method  You may need to change your method if it is increasing your risk for UTIs  · Wear cotton underwear and clothes that are loose  Tight pants and nylon underwear can trap moisture and cause bacteria to grow  · Vaginal estrogen may be recommended  This medicine helps prevent UTIs in women who have gone through menopause or are in larry-menopause  · Do pelvic muscle exercises often  Pelvic muscle exercises may help you start and stop urinating  Strong pelvic muscles may help you empty your bladder easier  Squeeze these muscles tightly for 5 seconds like you are trying to hold back urine  Then relax for 5 seconds  Gradually work up to squeezing for 10 seconds  Do 3 sets of 15 repetitions a day, or as directed  © Copyright [address] Information is for End User's use only and may not be sold, redistributed or otherwise used for commercial purposes  All illustrations and images included in CareNotes® are the copyrighted property of [employer]  or [address] [place], [place] [address]  The above information is an  only  It is not intended as medical advice for individual conditions or treatments  Talk to your doctor, nurse or pharmacist before following any medical regimen to see if it is safe and effective for you  Follow up with PCP in 3-5 days  Proceed to  ER if symptoms worsen  Chief Complaint     Chief Complaint   Patient presents with    Possible UTI     c/o burning, frequent urination, pressure and bilateral flank pain  started a few days ago  History of Present Illness       Frequent urination, burning with urination, on/off back pain, odor, and some right lower abdominal pain/pressure  Denies vaginal discharge/irritation, fevers, fatigue, muscle aches  H/O UTIs and kidney stones but doesn't feel like a kidney stone  Patient states she takes cefdinir and that doesn't cause any side effects  Review of Systems   Review of Systems   Constitutional: Negative for fatigue and fever  Gastrointestinal: Positive for abdominal pain  Genitourinary: Positive for dysuria, flank pain and frequency  Negative for hematuria, pelvic pain, urgency, vaginal bleeding, vaginal discharge and vaginal pain  Musculoskeletal: Positive for back pain  Neurological: Negative for weakness  Psychiatric/Behavioral: Negative for confusion           Current Medications       Current Outpatient Medications:     nicotine (NICODERM CQ) 14 mg/24hr TD 24 hr patch, Place 1 patch on the skin every 24 hours, Disp: 28 patch, Rfl: 5    cyclobenzaprine (FLEXERIL) 10 mg tablet, Take 1 tablet (10 mg total) by mouth 3 (three) times a day as needed for muscle spasms for up to 5 days, Disp: 15 tablet, Rfl: 0    diclofenac sodium (VOLTAREN) 50 mg EC tablet, Take 1 tablet (50 mg total) by mouth 2 (two) times a day (Patient not taking: Reported on 2021), Disp: 60 tablet, Rfl: 0    Famotidine (PEPCID AC MAXIMUM STRENGTH PO), , Disp: , Rfl:     ibuprofen (MOTRIN) 800 mg tablet, Take 1 tablet (800 mg total) by mouth every 8 (eight) hours as needed for moderate pain (Patient not taking: Reported on 2020), Disp: 90 tablet, Rfl: 0    nitrofurantoin (MACROBID) 100 mg capsule, Take 1 capsule (100 mg total) by mouth 2 (two) times a day for 7 days, Disp: 14 capsule, Rfl: 0    Current Allergies     Allergies as of 2021 - Reviewed 2021   Allergen Reaction Noted    Bactrim [sulfamethoxazole-trimethoprim] GI Intolerance 2017    Ciprofloxacin GI Intolerance 2015    Erythromycin GI Intolerance 2017    Erythromycin base  2015    Gluten meal - food allergy  2019    Ibuprofen GI Bleeding 2021    Lactose - food allergy  2019    Levaquin [levofloxacin] GI Intolerance 2017    Penicillins  2014    Tetracycline  2019    Valtrex [valacyclovir] Other (See Comments) 2017    Keflex [cephalexin] Swelling 2018            The following portions of the patient's history were reviewed and updated as appropriate: allergies, current medications, past family history, past medical history, past social history, past surgical history and problem list      Past Medical History:   Diagnosis Date    Choledocholithiasis 2017    Colon polyp     GERD (gastroesophageal reflux disease)        Past Surgical History:   Procedure Laterality Date     SECTION      CHOLECYSTECTOMY      CHOLECYSTECTOMY LAPAROSCOPIC N/A 9/6/2017    Procedure: CHOLECYSTECTOMY LAPAROSCOPIC, with IOC, possible open;  Surgeon: Luan Amezcua MD;  Location: [facility];  Service: General    DENTAL SURGERY      ERCP N/A 9/7/2017    Procedure: ENDOSCOPIC RETROGRADE CHOLANGIOPANCREATOGRAPHY (ERCP); Surgeon: Jeffrey Bolivar MD;  Location: [facility];  Service: Gastroenterology       Family History   Problem Relation Age of Onset    Hodgkin's lymphoma Mother     Cancer Father     Hypertension Father     Diabetes Father         of other type wth microalbuminuria, with long-term current use of insulin         Medications have been verified  Objective   /76   Pulse 103   Temp 98 °F (36 7 °C)   Resp 18   SpO2 96%        Physical Exam     Physical Exam  Constitutional:       Appearance: Normal appearance  Abdominal:      General: Abdomen is flat  Bowel sounds are normal       Palpations: Abdomen is soft  Tenderness: There is no abdominal tenderness  There is no right CVA tenderness, left CVA tenderness, guarding or rebound  Neurological:      Mental Status: She is alert     Psychiatric:         Mood and Affect: Mood normal          Behavior: Behavior normal

## 2021-04-09 LAB — BACTERIA UR CULT: NORMAL

## 2021-04-19 ENCOUNTER — OFFICE VISIT (OUTPATIENT)
Dept: PHYSICAL THERAPY | Facility: CLINIC | Age: 56
End: 2021-04-19
Payer: COMMERCIAL

## 2021-04-19 DIAGNOSIS — M62.830 BACK MUSCLE SPASM: ICD-10-CM

## 2021-04-19 DIAGNOSIS — M22.2X1 PATELLOFEMORAL SYNDROME OF BOTH KNEES: ICD-10-CM

## 2021-04-19 DIAGNOSIS — S83.412D SPRAIN OF MEDIAL COLLATERAL LIGAMENT OF LEFT KNEE, SUBSEQUENT ENCOUNTER: ICD-10-CM

## 2021-04-19 DIAGNOSIS — R29.898 WEAKNESS OF BOTH HIPS: ICD-10-CM

## 2021-04-19 DIAGNOSIS — M22.2X2 PATELLOFEMORAL SYNDROME OF BOTH KNEES: ICD-10-CM

## 2021-04-19 DIAGNOSIS — M25.362 PATELLAR INSTABILITY OF LEFT KNEE: Primary | ICD-10-CM

## 2021-04-19 PROCEDURE — 97530 THERAPEUTIC ACTIVITIES: CPT | Performed by: PHYSICAL THERAPIST

## 2021-04-19 PROCEDURE — 97110 THERAPEUTIC EXERCISES: CPT | Performed by: PHYSICAL THERAPIST

## 2021-04-19 RX ORDER — CYCLOBENZAPRINE HCL 10 MG
10 TABLET ORAL 3 TIMES DAILY PRN
Qty: 15 TABLET | Refills: 0 | Status: SHIPPED | OUTPATIENT
Start: 2021-04-19 | End: 2021-05-03

## 2021-04-19 NOTE — PROGRESS NOTES
Daily Note     Today's date: 2021  Patient name: Rich Lerma  : 1965  MRN: [id number]  Referring provider: Natasha Michelle DO  Dx:   Encounter Diagnosis     ICD-10-CM    1  Patellar instability of left knee  M25 362    2  Patellofemoral syndrome of both knees  M22 2X1     M22 2X2    3  Sprain of medial collateral ligament of left knee, subsequent encounter  S83 412D    4  Weakness of both hips  R29 898        Start Time: 1530  Stop Time: 1625  Total time in clinic (min): 55 minutes    Subjective: Patient reports that her knee has been feeling better and she was able to carry gallon jugs  Objective: See treatment diary below      Assessment: Tolerated treatment fair  Patient demonstrated fatigue post treatment and would benefit from continued PT  Patient with difficulty performing LSD and lunging secondary to increased pain in patellofemoral joint  Educated Patient regarding DOMS  Plan: Continue per plan of care  Progress treatment as tolerated  Precautions: standard      Manuals 2/16 2/23 2/25 3/16 4/7 4/19       Reevaluation GR    GR                                               Neuro Re-Ed                                                                                                        Ther Ex             Long-sitting gastroc str  3x30"   3x30"         Heel slides 10x10"  30x 5" hold  30x   5" hold         Quad sets with heel prop 20x5"  20x 5"  30x  5" hold         Supine SLR 2x10  3" hold 2x 10 eccentric contol  3" hold 2x10 eccentric contol  With TB RTB 2x10 ea  (+) s/l abd       TKE with TB             SAQ   20x  20x         Prone quad str  30" x 3  30"x3         Bike     10 min ROM         Standing hip abd, ext with TB     NV RTB 2x10 ea         Sidestepping with TB      RTB 3 laps       Ther Activity             Total gym: squats             Squats   10x  15x         FSU   10x 6"  15x 6"          FSD   10 x 6"  15x 6"         LSD     NV 2x10 4"       Stationary lunges NV 2x10 with slider       Gait Training                                       Modalities

## 2021-05-03 ENCOUNTER — APPOINTMENT (OUTPATIENT)
Dept: RADIOLOGY | Facility: CLINIC | Age: 56
End: 2021-05-03
Payer: COMMERCIAL

## 2021-05-03 ENCOUNTER — OFFICE VISIT (OUTPATIENT)
Dept: PHYSICAL THERAPY | Facility: CLINIC | Age: 56
End: 2021-05-03
Payer: COMMERCIAL

## 2021-05-03 ENCOUNTER — OFFICE VISIT (OUTPATIENT)
Dept: URGENT CARE | Facility: CLINIC | Age: 56
End: 2021-05-03
Payer: COMMERCIAL

## 2021-05-03 VITALS
WEIGHT: 160 LBS | RESPIRATION RATE: 16 BRPM | HEART RATE: 121 BPM | BODY MASS INDEX: 28.34 KG/M2 | TEMPERATURE: 98.1 F | SYSTOLIC BLOOD PRESSURE: 144 MMHG | DIASTOLIC BLOOD PRESSURE: 90 MMHG | OXYGEN SATURATION: 97 %

## 2021-05-03 DIAGNOSIS — S83.412D SPRAIN OF MEDIAL COLLATERAL LIGAMENT OF LEFT KNEE, SUBSEQUENT ENCOUNTER: ICD-10-CM

## 2021-05-03 DIAGNOSIS — M25.532 WRIST PAIN, LEFT: ICD-10-CM

## 2021-05-03 DIAGNOSIS — M22.2X1 PATELLOFEMORAL SYNDROME OF BOTH KNEES: ICD-10-CM

## 2021-05-03 DIAGNOSIS — M25.532 WRIST PAIN, LEFT: Primary | ICD-10-CM

## 2021-05-03 DIAGNOSIS — M25.362 PATELLAR INSTABILITY OF LEFT KNEE: Primary | ICD-10-CM

## 2021-05-03 DIAGNOSIS — M22.2X2 PATELLOFEMORAL SYNDROME OF BOTH KNEES: ICD-10-CM

## 2021-05-03 PROCEDURE — 73110 X-RAY EXAM OF WRIST: CPT

## 2021-05-03 PROCEDURE — 99213 OFFICE O/P EST LOW 20 MIN: CPT | Performed by: PREVENTIVE MEDICINE

## 2021-05-03 PROCEDURE — 97530 THERAPEUTIC ACTIVITIES: CPT

## 2021-05-03 PROCEDURE — 97110 THERAPEUTIC EXERCISES: CPT

## 2021-05-03 RX ORDER — LANOLIN ALCOHOL/MO/W.PET/CERES
1 CREAM (GRAM) TOPICAL 3 TIMES DAILY
COMMUNITY

## 2021-05-03 RX ORDER — CYCLOBENZAPRINE HCL 10 MG
10 TABLET ORAL 3 TIMES DAILY PRN
Qty: 30 TABLET | Refills: 0 | Status: SHIPPED | OUTPATIENT
Start: 2021-05-03 | End: 2021-06-04 | Stop reason: SDUPTHER

## 2021-05-03 RX ORDER — ACETAMINOPHEN 325 MG/1
650 TABLET ORAL EVERY 6 HOURS PRN
COMMUNITY

## 2021-05-03 NOTE — PROGRESS NOTES
[facility]        NAME: Yumiko Sotelo is a 63 y o  female  : 1965    MRN: [id number]  DATE: May 3, 2021  TIME: 4:19 PM    Assessment and Plan   Wrist pain, left [M25 532]  1  Wrist pain, left  XR wrist 3+ vw left    cyclobenzaprine (FLEXERIL) 10 mg tablet    Diclofenac Sodium (VOLTAREN) 1 %         Patient Instructions       Follow up with PCP in 3-5 days  Proceed to  ER if symptoms worsen  Chief Complaint     Chief Complaint   Patient presents with    Wrist Pain     L wrist pain, started yesterday  States she thinks she aggravated it riding her motorcycle the day before         History of Present Illness       Wrist Pain   The pain is present in the left wrist  This is a new problem  The current episode started yesterday  There has been no history of extremity trauma  The problem occurs constantly  The problem has been unchanged  The quality of the pain is described as aching and sharp  The pain is at a severity of 8/10  The pain is severe  Associated symptoms include joint swelling and a limited range of motion  The symptoms are aggravated by activity  She has tried cold for the symptoms  The treatment provided mild relief  Review of Systems   Review of Systems   Constitutional: Negative  Respiratory: Negative  Cardiovascular: Negative  Musculoskeletal: Positive for arthralgias, joint swelling and myalgias  All other systems reviewed and are negative          Current Medications       Current Outpatient Medications:     acetaminophen (TYLENOL) 325 mg tablet, Take 650 mg by mouth every 6 (six) hours as needed for mild pain, Disp: , Rfl:     Calcium Acetate, Phos Binder, (CALCIUM ACETATE PO), Take by mouth, Disp: , Rfl:     cyclobenzaprine (FLEXERIL) 10 mg tablet, Take 1 tablet (10 mg total) by mouth 3 (three) times a day as needed for muscle spasms for up to 5 days, Disp: 15 tablet, Rfl: 0    glucosamine-chondroitin 500-400 MG tablet, Take 1 tablet by mouth 3 (three) times a day, Disp: , Rfl:     nicotine (NICODERM CQ) 14 mg/24hr TD 24 hr patch, Place 1 patch on the skin every 24 hours, Disp: 28 patch, Rfl: 5    cyclobenzaprine (FLEXERIL) 10 mg tablet, Take 1 tablet (10 mg total) by mouth 3 (three) times a day as needed for muscle spasms, Disp: 30 tablet, Rfl: 0    Diclofenac Sodium (VOLTAREN) 1 %, Apply 2 g topically 4 (four) times a day, Disp: 150 g, Rfl: 0    diclofenac sodium (VOLTAREN) 50 mg EC tablet, Take 1 tablet (50 mg total) by mouth 2 (two) times a day (Patient not taking: Reported on 2021), Disp: 60 tablet, Rfl: 0    Famotidine (PEPCID AC MAXIMUM STRENGTH PO), , Disp: , Rfl:     ibuprofen (MOTRIN) 800 mg tablet, Take 1 tablet (800 mg total) by mouth every 8 (eight) hours as needed for moderate pain (Patient not taking: Reported on 2020), Disp: 90 tablet, Rfl: 0    Current Allergies     Allergies as of 2021 - Reviewed 2021   Allergen Reaction Noted    Bactrim [sulfamethoxazole-trimethoprim] GI Intolerance 2017    Ciprofloxacin GI Intolerance 2015    Erythromycin GI Intolerance 2017    Erythromycin base  2015    Gluten meal - food allergy  2019    Ibuprofen GI Bleeding 2021    Lactose - food allergy  2019    Levaquin [levofloxacin] GI Intolerance 2017    Penicillins  2014    Tetracycline  2019    Valtrex [valacyclovir] Other (See Comments) 2017            The following portions of the patient's history were reviewed and updated as appropriate: allergies, current medications, past family history, past medical history, past social history, past surgical history and problem list      Past Medical History:   Diagnosis Date    Choledocholithiasis 2017    Colon polyp     GERD (gastroesophageal reflux disease)        Past Surgical History:   Procedure Laterality Date     SECTION      CHOLECYSTECTOMY      CHOLECYSTECTOMY LAPAROSCOPIC N/A 2017 Procedure: CHOLECYSTECTOMY LAPAROSCOPIC, with IOC, possible open;  Surgeon: Carolyn Oabndo MD;  [facility];  Service: General    DENTAL SURGERY      ERCP N/A 9/7/2017    Procedure: ENDOSCOPIC RETROGRADE CHOLANGIOPANCREATOGRAPHY (ERCP); Surgeon: Sharad Johnson MD;  [facility];  Service: Gastroenterology       Family History   Problem Relation Age of Onset    Hodgkin's lymphoma Mother     Cancer Father     Hypertension Father     Diabetes Father         of other type wth microalbuminuria, with long-term current use of insulin         Medications have been verified  Objective   /90   Pulse (!) 121   Temp 98 1 °F (36 7 °C) (Temporal)   Resp 16   Wt 72 6 kg (160 lb)   SpO2 97%   BMI 28 34 kg/m²        Physical Exam     Physical Exam  Vitals signs and nursing note reviewed  Constitutional:       Appearance: She is well-developed  Cardiovascular:      Rate and Rhythm: Normal rate and regular rhythm  Pulmonary:      Effort: Pulmonary effort is normal       Breath sounds: Normal breath sounds  Musculoskeletal:      Left wrist: She exhibits decreased range of motion, tenderness and swelling

## 2021-05-03 NOTE — PROGRESS NOTES
Daily Note     Today's date: 5/3/2021  Patient name: Navneet Pardo  : 1965  MRN: [id number]  Referring provider: Rebekah Schmidt DO  Dx:   Encounter Diagnosis     ICD-10-CM    1  Patellar instability of left knee  M25 362    2  Patellofemoral syndrome of both knees  M22 2X1     M22 2X2    3  Sprain of medial collateral ligament of left knee, subsequent encounter  S83 412D                   Subjective: Pt noted that her arm is killin her 4 hour bike ride  but other than that her L knee has been pretty good  Pt arrived for appointment late but was accommodated  Objective: See treatment diary below      Assessment:  Continued with treatment session, overall patient able to complete all exercises with no increase in pain  Pt challenged by progressions added last visit  Noted her favorite is the maggie  Pt noted she is compliant with her HEP at home  Tolerated treatment fair  Patient exhibited good technique with therapeutic exercises and would benefit from continued PT  S/p treatment session pt noted no increase in soreness  Reviewed DOMS with patient and re educated to continue with her HEP at home on a daily basis  Plan: Continue per plan of care  Precautions: standard      Manuals 2/16 2/23 2/25 3/16 4/7 4/19 5/3      Reevaluation GR    GR                                               Neuro Re-Ed                                                                                                        Ther Ex             Long-sitting gastroc str  3x30"   3x30"         Heel slides 10x10"  30x 5" hold  30x   5" hold         Quad sets with heel prop 20x5"  20x 5"  30x  5" hold         Supine SLR 2x10  3" hold 2x 10 eccentric contol  3" hold 2x10 eccentric contol  With TB RTB 2x10 ea  (+) s/l abd RTB 2x 10 ea  And abd       TKE with TB             SAQ   20x  20x         Prone quad str  30" x 3  30"x3         Bike     10 min ROM         Standing hip abd, ext with TB     NV RTB 2x10 ea   RTB 2x [address] [facility] [employer] with TB      RTB 3 laps RTB 4 laps       Ther Activity             Total gym: squats             Squats   10x  15x         FSU   10x 6"  15x 6"          FSD   10 x 6"  15x 6"         LSD     NV 2x10 4" NV resume       Stationary lunges     NV 2x10 with slider 2x 10 w/ slider       Gait Training                                       Modalities

## 2021-05-03 NOTE — LETTER
May 3, 2021     Patient: Khalida Yoon   YOB: 1965   Date of Visit: 5/3/2021       To Whom It May Concern: It is my medical opinion that Khalida Yoon should remain out of work until 5/6/2021  If you have any questions or concerns, please don't hesitate to call           Sincerely,        Nancy Padgett MD    CC: No Recipients

## 2021-05-03 NOTE — PATIENT INSTRUCTIONS
Wrist Injury   WHAT YOU NEED TO KNOW:   A wrist injury is damage to the tissues of your wrist joint  Examples are a fracture, sprain (stretched or torn ligament), or strain (stretched or torn tendon)  DISCHARGE INSTRUCTIONS:   Return to the emergency department if:   · The skin on or near your wrist or hand feels cold or turns blue or white  · The skin on or near your wrist or hand is tight, raised, and swollen  · You have new trouble moving and using your hands, fingers, or wrist     · Your wrist, hands, or fingers become swollen, red, numb, or tingle  · You have any open wounds that are red, swollen, warm, or have pus coming from them  Call your doctor if:   · You have a fever  · The bruising, swelling, or pain in your wrist gets worse  · You have questions or concerns about your condition or care  Medicines: You may need any of the following:  · NSAIDs , such as ibuprofen, help decrease swelling, pain, and fever  NSAIDs can cause stomach bleeding or kidney problems in certain people  If you take blood thinner medicine, always ask your healthcare provider if NSAIDs are safe for you  Always read the medicine label and follow directions  · Prescription pain medicine  may be given  Ask your healthcare provider how to take this medicine safely  Some prescription pain medicines contain acetaminophen  Do not take other medicines that contain acetaminophen without talking to your healthcare provider  Too much acetaminophen may cause liver damage  Prescription pain medicine may cause constipation  Ask your healthcare provider how to prevent or treat constipation  · Take your medicine as directed  Contact your healthcare provider if you think your medicine is not helping or if you have side effects  Tell him or her if you are allergic to any medicine  Keep a list of the medicines, vitamins, and herbs you take  Include the amounts, and when and why you take them   Bring the list or the pill bottles to follow-up visits  Carry your medicine list with you in case of an emergency  Manage your symptoms:   · Rest  your wrist for 48 hours, or as directed  Avoid activities that cause pain  Ask which activities you should avoid and for how long  Ask when you may return to your regular physical activities or sports  If you start to use your wrist too soon, you may injure it wrist again  · Put ice  on your wrist to decrease pain and swelling  Use an ice pack, or put crushed ice in a plastic bag  Wrap a towel around the bag before you put it on your wrist  Apply ice for 15 minutes every hour, or as directed  · Apply compression  by wrapping your wrist with an elastic bandage  This will help decrease swelling, support your wrist, and help it heal  Wear your wrist wrap as directed  The bandage should be snug but not so tight that your fingers are numb or tingly  · Elevate  your wrist above the level of your heart when you sit or lie down  Prop your arm and hand on pillows to keep your wrist elevated comfortably  · Go to physical therapy,  if directed  A physical therapist can teach you exercises to strengthen your wrist and improve the range of movement  These exercises may also help decrease your pain  Prevent another wrist injury:   · Do strengthening exercises  Your healthcare provider or physical therapist may suggest that you do exercises to strengthen your hand and arm muscles  He or she will tell you when to start doing these exercises and how long to continue  · Protect your wrists  Wrist guard splints or protective tape can help support your wrist during exercise and sports  These devices may also keep your wrist from bending too far back  Ask for more information about the type of wrist support that you should use  Follow up with your doctor as directed:  Write down your questions so you remember to ask them during your visits    © Copyright [employer] 2020 Information is for End User's use only and may not be sold, redistributed or otherwise used for commercial purposes  All illustrations and images included in CareNotes® are the copyrighted property of [employer]  or Jayleen Angel  The above information is an  only  It is not intended as medical advice for individual conditions or treatments  Talk to your doctor, nurse or pharmacist before following any medical regimen to see if it is safe and effective for you

## 2021-05-17 ENCOUNTER — OFFICE VISIT (OUTPATIENT)
Dept: PHYSICAL THERAPY | Facility: CLINIC | Age: 56
End: 2021-05-17
Payer: COMMERCIAL

## 2021-05-17 DIAGNOSIS — M22.2X2 PATELLOFEMORAL SYNDROME OF BOTH KNEES: ICD-10-CM

## 2021-05-17 DIAGNOSIS — S83.412D SPRAIN OF MEDIAL COLLATERAL LIGAMENT OF LEFT KNEE, SUBSEQUENT ENCOUNTER: ICD-10-CM

## 2021-05-17 DIAGNOSIS — M25.362 PATELLAR INSTABILITY OF LEFT KNEE: Primary | ICD-10-CM

## 2021-05-17 DIAGNOSIS — M22.2X1 PATELLOFEMORAL SYNDROME OF BOTH KNEES: ICD-10-CM

## 2021-05-17 DIAGNOSIS — R29.898 WEAKNESS OF BOTH HIPS: ICD-10-CM

## 2021-05-17 PROCEDURE — 97110 THERAPEUTIC EXERCISES: CPT | Performed by: PHYSICAL THERAPIST

## 2021-05-17 PROCEDURE — 97140 MANUAL THERAPY 1/> REGIONS: CPT | Performed by: PHYSICAL THERAPIST

## 2021-05-17 NOTE — PROGRESS NOTES
PT Re-Evaluation  and PT Discharge    Today's date: 2021  Patient name: Noemí Kirk  : 1965  MRN: [id number]  Referring provider: Екатерина Schafer DO  Dx:   Encounter Diagnosis     ICD-10-CM    1  Patellar instability of left knee  M25 362    2  Patellofemoral syndrome of both knees  M22 2X1     M22 2X2    3  Sprain of medial collateral ligament of left knee, subsequent encounter  S83 412D    4  Weakness of both hips  R29 898        Start Time: 1540  Stop Time: 1630  Total time in clinic (min): 50 minutes    Assessment  Assessment details: Since beginning physical therapy, Bob Ingram has attended a total number of 9 visits and has maintained excellent compliance with established POC  Patient has made significant improvements in all areas, including decreased pain, increased strength, increased ROM, improved flexibility, improved joint mobility, decreased joint effusion, improved balance, improved quality of gait and improved overall level of function  Patient is reporting improved ability to perform a/iadls, recreational activities, work-related activities and engaging in social activities  Patient is independent with comprehensive HEP  Patient has been instructed to contact PT if she begins to notice a decline in function or has any questions or concerns  Patient will be discharged at this time  Patient is in agreement with plan  Understanding of Dx/Px/POC: excellent  Goals  ST  Patient will report 25% decrease in pain in 4 weeks  MET  2  Patient will demonstrate 25% improvement in ROM in 4 weeks  MET  3  Patient will demonstrate 1/2 grade improvement in strength in 4 weeks  MET    LT  Patient will be able to perform IADLS without restriction or pain by discharge  MET  2  Patient will be independent in HEP by discharge  MET  3  Patient will be able to return to recreational/work duties without restriction or pain by discharge   MET    Plan  Planned therapy interventions: home exercise program  Duration in visits: 1  Plan of Care beginning date: 2021  Plan of Care expiration date: 2021  Treatment plan discussed with: patient        Subjective Evaluation    History of Present Illness  Mechanism of injury: Patient estimates her left knee overall level of function to be approximately 80-90% of her premorbid norm  Patient states that she is back to all her normal activities, including riding her motorcycle, lifting and walking with less difficulty  Patient remains cautious with activity, but no longer restricts herself  Patient feels independent with her HEP and is prepared for d/c at this time  Pain  Current pain ratin  At best pain ratin  At worst pain ratin  Location: left knee: medially  Quality: dull ache    Treatments  Current treatment: physical therapy  Patient Goals  Patient goals for therapy: decreased edema, decreased pain, increased motion, return to work, return to [employer] activities, independence with ADLs/IADLs and increased strength          Objective     Neurological Testing     Sensation     Knee   Left Knee   Intact: light touch    Right Knee   Intact: light touch     Active Range of Motion   Left Knee   Flexion: WFL  Extension: WFL    Passive Range of Motion   Left Knee   Flexion: WFL  Extension: [facility]    Mobility   Patellar Mobility:   Left Knee   WFL: medial, lateral, superior and inferior  Patellar Mobility Comments   Left superior tendon comments: (+) pain, crepitus  Left inferior tendon comments: (+) pain, crepitus  Strength/Myotome Testing     Left Hip   Planes of Motion   Flexion: 5  Extension: 5  Abduction: 5    Left Knee   Flexion: 5  Extension: 5    Left Ankle/Foot   Dorsiflexion: 5    Ambulation     Ambulation: Level Surfaces   Ambulation without assistive device: independent    Observational Gait   Gait: within functional limits     Functional Assessment        Comments  Bilateral squat: good technique  FSU 8" step:  Independent  FSD 8" step: Independent, excellent eccentric control, (-) pain      Flowsheet Rows      Most Recent Value   PT/OT G-Codes   Current Score  49   Projected Score  71             Precautions: standard      Manuals 2/16 2/23 2/25 3/16 4/7 4/19 5/3 5/17     Reevaluation GR    GR   GR                                            Neuro Re-Ed                                                                                                        Ther Ex             Long-sitting gastroc str  3x30"   3x30"         Heel slides 10x10"  30x 5" hold  30x   5" hold         Quad sets with heel prop 20x5"  20x 5"  30x  5" hold         Supine SLR 2x10  3" hold 2x 10 eccentric contol  3" hold 2x10 eccentric contol  With TB RTB 2x10 ea  (+) s/l abd RTB 2x 10 ea  And abd       TKE with TB             SAQ   20x  20x         Prone quad str  30" x 3  30"x3         Bike     10 min ROM    10 min     Standing hip abd, ext with TB     NV RTB 2x10 ea  RTB 2x 10 ea         Sidestepping with TB      RTB 3 laps RTB 4 laps       Patient education / HEP review        GR     Ther Activity             Total gym: squats             Squats   10x  15x         FSU   10x 6"  15x 6"          FSD   10 x 6"  15x 6"         LSD     NV 2x10 4" NV resume       Stationary lunges     NV 2x10 with slider 2x 10 w/ slider       Gait Training                                       Modalities

## 2021-05-17 NOTE — PROGRESS NOTES
Daily Note     Today's date: 2021  Patient name: Ousmane Diaz  : 1965  MRN: [id number]  Referring provider: Jesus Bolaños DO  Dx:   Encounter Diagnosis     ICD-10-CM    1  Patellar instability of left knee  M25 362    2  Patellofemoral syndrome of both knees  M22 2X1     M22 2X2    3  Sprain of medial collateral ligament of left knee, subsequent encounter  S83 412D    4  Weakness of both hips  R29 898                   Subjective: f/u with ortho       Objective: See treatment diary below      Assessment: Tolerated treatment well  Patient demonstrated fatigue post treatment and would benefit from continued PT  Plan: Continue per plan of care  Progress treatment as tolerated  Precautions: standard      Manuals 2/16 2/23 2/25 3/16 4/7 4/19 5/3 5/17     Reevaluation GR    MELANIE                                               Neuro Re-Ed                                                                                                        Ther Ex             Long-sitting gastroc str  3x30"   3x30"         Heel slides 10x10"  30x 5" hold  30x   5" hold         Quad sets with heel prop 20x5"  20x 5"  30x  5" hold         Supine SLR 2x10  3" hold 2x 10 eccentric contol  3" hold 2x10 eccentric contol  With TB RTB 2x10 ea  (+) s/l abd RTB 2x 10 ea  And abd       TKE with TB             SAQ   20x  20x         Prone quad str  30" x 3  30"x3         Bike     10 min ROM         Standing hip abd, ext with TB     NV RTB 2x10 ea  RTB 2x 10 ea         Sidestepping with TB      RTB 3 laps RTB 4 laps       Ther Activity             Total gym: squats             Squats   10x  15x         FSU   10x 6"  15x 6"          FSD   10 x 6"  15x 6"         LSD     NV 2x10 4" NV resume       Stationary lunges     NV 2x10 with slider 2x 10 w/ slider       Gait Training                                       Modalities

## 2021-06-04 ENCOUNTER — NURSE TRIAGE (OUTPATIENT)
Dept: OTHER | Facility: OTHER | Age: 56
End: 2021-06-04

## 2021-06-04 DIAGNOSIS — M25.532 WRIST PAIN, LEFT: ICD-10-CM

## 2021-06-04 RX ORDER — CYCLOBENZAPRINE HCL 10 MG
10 TABLET ORAL 3 TIMES DAILY PRN
Qty: 7 TABLET | Refills: 0 | Status: SHIPPED | OUTPATIENT
Start: 2021-06-04

## 2021-06-04 NOTE — TELEPHONE ENCOUNTER
Regarding: Out of cyclobenzaprine RX  ----- Message from [facility] sent at 6/4/2021  6:24 PM EDT -----  "I ran out of my cyclobenzaprine 10 mg once daily  I totally forgot, I went to take it and I have been so caught up with my  being sick he has cancer I went to take it and time got away from me and I never was able to fill it   I usually am good about this but now its the weekend and the office is closed "

## 2021-06-04 NOTE — TELEPHONE ENCOUNTER
Reason for Disposition   Caller requesting a refill, no triage required, and triager able to refill per unit policy    Answer Assessment - Initial Assessment Questions  1  NAME of MEDICATION: "What medicine are you calling about?"      flexiril  2  QUESTION: "What is your question?"      I am completely out  3  PRESCRIBING HCP: "Who prescribed it?" Reason: if prescribed by specialist, call should be referred to that group       PCP    Protocols used: MEDICATION QUESTION CALL-ADULT-

## 2021-06-07 DIAGNOSIS — M25.532 WRIST PAIN, LEFT: ICD-10-CM

## 2021-06-07 RX ORDER — CYCLOBENZAPRINE HCL 10 MG
10 TABLET ORAL 3 TIMES DAILY PRN
Qty: 7 TABLET | Refills: 0 | OUTPATIENT
Start: 2021-06-07

## 2021-07-09 ENCOUNTER — OFFICE VISIT (OUTPATIENT)
Dept: URGENT CARE | Facility: CLINIC | Age: 56
End: 2021-07-09
Payer: COMMERCIAL

## 2021-07-09 VITALS
BODY MASS INDEX: 28.35 KG/M2 | OXYGEN SATURATION: 95 % | TEMPERATURE: 98.5 F | WEIGHT: 160 LBS | DIASTOLIC BLOOD PRESSURE: 72 MMHG | HEART RATE: 120 BPM | SYSTOLIC BLOOD PRESSURE: 120 MMHG | RESPIRATION RATE: 18 BRPM | HEIGHT: 63 IN

## 2021-07-09 DIAGNOSIS — J01.00 ACUTE NON-RECURRENT MAXILLARY SINUSITIS: ICD-10-CM

## 2021-07-09 DIAGNOSIS — R21 RASH: ICD-10-CM

## 2021-07-09 DIAGNOSIS — J06.9 ACUTE URI: Primary | ICD-10-CM

## 2021-07-09 PROCEDURE — U0003 INFECTIOUS AGENT DETECTION BY NUCLEIC ACID (DNA OR RNA); SEVERE ACUTE RESPIRATORY SYNDROME CORONAVIRUS 2 (SARS-COV-2) (CORONAVIRUS DISEASE [COVID-19]), AMPLIFIED PROBE TECHNIQUE, MAKING USE OF HIGH THROUGHPUT TECHNOLOGIES AS DESCRIBED BY CMS-2020-01-R: HCPCS | Performed by: PHYSICIAN ASSISTANT

## 2021-07-09 PROCEDURE — U0005 INFEC AGEN DETEC AMPLI PROBE: HCPCS | Performed by: PHYSICIAN ASSISTANT

## 2021-07-09 PROCEDURE — 99213 OFFICE O/P EST LOW 20 MIN: CPT | Performed by: PHYSICIAN ASSISTANT

## 2021-07-09 RX ORDER — FLUTICASONE PROPIONATE 50 MCG
1 SPRAY, SUSPENSION (ML) NASAL DAILY
Qty: 16 G | Refills: 0 | Status: SHIPPED | OUTPATIENT
Start: 2021-07-09

## 2021-07-09 RX ORDER — CEFDINIR 300 MG/1
300 CAPSULE ORAL EVERY 12 HOURS SCHEDULED
Qty: 14 CAPSULE | Refills: 0 | Status: SHIPPED | OUTPATIENT
Start: 2021-07-09 | End: 2021-07-16

## 2021-07-09 RX ORDER — BENZONATATE 100 MG/1
100 CAPSULE ORAL 3 TIMES DAILY PRN
Qty: 30 CAPSULE | Refills: 0 | Status: SHIPPED | OUTPATIENT
Start: 2021-07-09

## 2021-07-09 NOTE — PROGRESS NOTES
[facility]        NAME: Marquis Russell is a 61 y o  female  : 1965    MRN: [id number]  DATE: 2021  TIME: 3:28 PM    Assessment and Plan   Acute URI [J06 9]  1  Acute URI  Novel Coronavirus (Covid-19),PCR [facility]   2  Acute non-recurrent maxillary sinusitis  benzonatate (TESSALON PERLES) 100 mg capsule    fluticasone (FLONASE) 50 mcg/act nasal spray    cefdinir (OMNICEF) 300 mg capsule   3  Rash  hydrocortisone 2 5 % cream         Patient Instructions     Patient Instructions   Start flonase, sudafed otc, and tessalon now  Hold cefdinir pending covid swab or if symptoms persist past 1 week  Follow up with PCP in 3-5 days  Proceed to  ER if symptoms worsen  Chief Complaint     Chief Complaint   Patient presents with    Cough     wet cough   Cold Like Symptoms     x a few days but got worse yesterday  nasal congestion, headache, R ear pain, sore throat, sinus pressure/pain, body aches, abdominal pain with frequent urination/BMs, knees feel on fire at night    Rash     itchy generalized rash; unsure if poison or bug bites  History of Present Illness       59-year-old female presents to the clinic with 3-4 days of body aches,  fever around 100 chills  No COVID shot this year  Unfortunately her  recently passed away last week  She thought she was tired from distress but then she started with runny nose sinus pressure right ear pain  She was a hearing aid on the left  No ear drainage  She reports some cough and chest tightness but no shortness of breath  She has not take any medicines over-the-counter for this  She has some diarrhea and abdominal cramping but no nausea or vomiting  No blood in her stool  Review of Systems   Review of Systems   Constitutional: Positive for chills, fatigue and fever  HENT: Positive for congestion, ear pain, postnasal drip, rhinorrhea, sinus pressure, sinus pain and sore throat  Negative for trouble swallowing  Eyes: Negative for visual disturbance  Respiratory: Positive for cough and chest tightness  Negative for shortness of breath  Cardiovascular: Negative for chest pain and palpitations  Gastrointestinal: Positive for diarrhea  Negative for nausea and vomiting  Musculoskeletal: Positive for myalgias  Neurological: Negative for dizziness           Current Medications       Current Outpatient Medications:     nicotine (NICODERM CQ) 14 mg/24hr TD 24 hr patch, Place 1 patch on the skin every 24 hours, Disp: 28 patch, Rfl: 5    acetaminophen (TYLENOL) 325 mg tablet, Take 650 mg by mouth every 6 (six) hours as needed for mild pain (Patient not taking: Reported on 7/9/2021), Disp: , Rfl:     benzonatate (TESSALON PERLES) 100 mg capsule, Take 1 capsule (100 mg total) by mouth 3 (three) times a day as needed for cough, Disp: 30 capsule, Rfl: 0    Calcium Acetate, Phos Binder, (CALCIUM ACETATE PO), Take by mouth (Patient not taking: Reported on 7/9/2021), Disp: , Rfl:     cefdinir (OMNICEF) 300 mg capsule, Take 1 capsule (300 mg total) by mouth every 12 (twelve) hours for 7 days, Disp: 14 capsule, Rfl: 0    cyclobenzaprine (FLEXERIL) 10 mg tablet, Take 1 tablet (10 mg total) by mouth 3 (three) times a day as needed for muscle spasms for up to 5 days, Disp: 15 tablet, Rfl: 0    cyclobenzaprine (FLEXERIL) 10 mg tablet, Take 1 tablet (10 mg total) by mouth 3 (three) times a day as needed for muscle spasms (Patient not taking: Reported on 7/9/2021), Disp: 7 tablet, Rfl: 0    Diclofenac Sodium (VOLTAREN) 1 %, Apply 2 g topically 4 (four) times a day (Patient not taking: Reported on 7/9/2021), Disp: 150 g, Rfl: 0    diclofenac sodium (VOLTAREN) 50 mg EC tablet, Take 1 tablet (50 mg total) by mouth 2 (two) times a day (Patient not taking: Reported on 4/8/2021), Disp: 60 tablet, Rfl: 0    Famotidine (PEPCID AC MAXIMUM STRENGTH PO), , Disp: , Rfl:     fluticasone (FLONASE) 50 mcg/act nasal spray, 1 spray into each nostril daily, Disp: 16 g, Rfl: 0    glucosamine-chondroitin 500-400 MG tablet, Take 1 tablet by mouth 3 (three) times a day (Patient not taking: Reported on 2021), Disp: , Rfl:     hydrocortisone 2 5 % cream, Apply topically 2 (two) times a day, Disp: 30 g, Rfl: 0    ibuprofen (MOTRIN) 800 mg tablet, Take 1 tablet (800 mg total) by mouth every 8 (eight) hours as needed for moderate pain (Patient not taking: Reported on 2020), Disp: 90 tablet, Rfl: 0    Current Allergies     Allergies as of 2021 - Reviewed 2021   Allergen Reaction Noted    Bactrim [sulfamethoxazole-trimethoprim] GI Intolerance 2017    Ciprofloxacin GI Intolerance 2015    Erythromycin GI Intolerance 2017    Erythromycin base  2015    Gluten meal - food allergy  2019    Ibuprofen GI Bleeding 2021    Lactose - food allergy  2019    Levaquin [levofloxacin] GI Intolerance 2017    Penicillins  2014    Tetracycline  2019    Valtrex [valacyclovir] Other (See Comments) 2017            The following portions of the patient's history were reviewed and updated as appropriate: allergies, current medications, past family history, past medical history, past social history, past surgical history and problem list      Past Medical History:   Diagnosis Date    Choledocholithiasis 2017    Colon polyp     GERD (gastroesophageal reflux disease)        Past Surgical History:   Procedure Laterality Date     SECTION      CHOLECYSTECTOMY      CHOLECYSTECTOMY LAPAROSCOPIC N/A 2017    Procedure: CHOLECYSTECTOMY LAPAROSCOPIC, with IOC, possible open;  Surgeon: Robin Skaggs MD;  Location: [facility];  Service: General    DENTAL SURGERY      ERCP N/A 2017    Procedure: ENDOSCOPIC RETROGRADE CHOLANGIOPANCREATOGRAPHY (ERCP);   Surgeon: Berkley Salas MD;  Location: [facility];  Service: Gastroenterology       Family History   Problem Relation Age of Onset    Hodgkin's lymphoma Mother     Cancer Father     Hypertension Father     Diabetes Father         of other type wth microalbuminuria, with long-term current use of insulin         Medications have been verified  Objective   /72 (BP Location: Left arm, Patient Position: Sitting)   Pulse (!) 120   Temp 98 5 °F (36 9 °C) (Temporal)   Resp 18   Ht 5' 3" (1 6 m)   Wt 72 6 kg (160 lb)   SpO2 95%   BMI 28 34 kg/m²        Physical Exam     Physical Exam  Constitutional:       General: She is not in acute distress  Appearance: She is well-developed  HENT:      Head: Normocephalic and atraumatic  Right Ear: Tympanic membrane, ear canal and external ear normal  No middle ear effusion  Tympanic membrane is not erythematous, retracted or bulging  Left Ear: Tympanic membrane, ear canal and external ear normal   No middle ear effusion  Tympanic membrane is not erythematous, retracted or bulging  Nose: Congestion present  No mucosal edema or rhinorrhea  Right Sinus: Maxillary sinus tenderness present  No frontal sinus tenderness  Left Sinus: No maxillary sinus tenderness or frontal sinus tenderness  Mouth/Throat:      Pharynx: Posterior oropharyngeal erythema present  Comments: Post nasal drip  Eyes:      General:         Right eye: No discharge  Left eye: No discharge  Conjunctiva/sclera: Conjunctivae normal       Right eye: Right conjunctiva is not injected  No chemosis  Left eye: Left conjunctiva is not injected  No chemosis  Pupils: Pupils are equal, round, and reactive to light  Cardiovascular:      Rate and Rhythm: Normal rate and regular rhythm  Heart sounds: Normal heart sounds  Pulmonary:      Effort: Pulmonary effort is normal  No respiratory distress  Breath sounds: Normal breath sounds  No wheezing or rales  Abdominal:      General: Bowel sounds are normal  There is no distension        Palpations: Abdomen is soft  Abdomen is not rigid  Tenderness: There is no abdominal tenderness  There is no guarding or rebound  Musculoskeletal:      Cervical back: Normal range of motion and neck supple  Lymphadenopathy:      Cervical: No cervical adenopathy  Right cervical: No superficial cervical adenopathy  Left cervical: No superficial cervical adenopathy  Skin:     General: Skin is warm and dry  Findings: No rash  Neurological:      Mental Status: She is alert and oriented to person, place, and time  Cranial Nerves: No cranial nerve deficit

## 2021-12-20 ENCOUNTER — OFFICE VISIT (OUTPATIENT)
Dept: URGENT CARE | Facility: CLINIC | Age: 56
End: 2021-12-20
Payer: COMMERCIAL

## 2021-12-20 VITALS
TEMPERATURE: 98.6 F | HEART RATE: 88 BPM | OXYGEN SATURATION: 97 % | RESPIRATION RATE: 18 BRPM | SYSTOLIC BLOOD PRESSURE: 160 MMHG | DIASTOLIC BLOOD PRESSURE: 90 MMHG

## 2021-12-20 DIAGNOSIS — R09.81 NASAL CONGESTION: Primary | ICD-10-CM

## 2021-12-20 DIAGNOSIS — M79.10 MYALGIA: ICD-10-CM

## 2021-12-20 DIAGNOSIS — M54.50 BILATERAL LOW BACK PAIN WITHOUT SCIATICA, UNSPECIFIED CHRONICITY: ICD-10-CM

## 2021-12-20 LAB
SL AMB  POCT GLUCOSE, UA: ABNORMAL
SL AMB LEUKOCYTE ESTERASE,UA: ABNORMAL
SL AMB POCT BILIRUBIN,UA: ABNORMAL
SL AMB POCT BLOOD,UA: ABNORMAL
SL AMB POCT CLARITY,UA: CLEAR
SL AMB POCT COLOR,UA: YELLOW
SL AMB POCT KETONES,UA: ABNORMAL
SL AMB POCT NITRITE,UA: ABNORMAL
SL AMB POCT PH,UA: 5
SL AMB POCT SPECIFIC GRAVITY,UA: 1.03
SL AMB POCT URINE PROTEIN: ABNORMAL
SL AMB POCT UROBILINOGEN: 0.2

## 2021-12-20 PROCEDURE — 81002 URINALYSIS NONAUTO W/O SCOPE: CPT | Performed by: EMERGENCY MEDICINE

## 2021-12-20 PROCEDURE — 99213 OFFICE O/P EST LOW 20 MIN: CPT | Performed by: EMERGENCY MEDICINE

## 2021-12-20 PROCEDURE — 87636 SARSCOV2 & INF A&B AMP PRB: CPT | Performed by: EMERGENCY MEDICINE

## 2021-12-20 RX ORDER — BROMPHENIRAMINE MALEATE, PSEUDOEPHEDRINE HYDROCHLORIDE, AND DEXTROMETHORPHAN HYDROBROMIDE 2; 30; 10 MG/5ML; MG/5ML; MG/5ML
5 SYRUP ORAL 4 TIMES DAILY PRN
Qty: 120 ML | Refills: 0 | Status: SHIPPED | OUTPATIENT
Start: 2021-12-20

## 2021-12-21 LAB
FLUAV RNA RESP QL NAA+PROBE: NEGATIVE
FLUBV RNA RESP QL NAA+PROBE: NEGATIVE
SARS-COV-2 RNA RESP QL NAA+PROBE: POSITIVE

## 2021-12-23 ENCOUNTER — TELEMEDICINE (OUTPATIENT)
Dept: FAMILY MEDICINE CLINIC | Facility: CLINIC | Age: 56
End: 2021-12-23
Payer: COMMERCIAL

## 2021-12-23 DIAGNOSIS — U07.1 COVID-19: Primary | ICD-10-CM

## 2021-12-23 DIAGNOSIS — H92.01 ACUTE PAIN OF RIGHT EAR: ICD-10-CM

## 2021-12-23 PROCEDURE — 1036F TOBACCO NON-USER: CPT | Performed by: PHYSICIAN ASSISTANT

## 2021-12-23 PROCEDURE — 99213 OFFICE O/P EST LOW 20 MIN: CPT | Performed by: PHYSICIAN ASSISTANT

## 2021-12-23 RX ORDER — CEFDINIR 300 MG/1
300 CAPSULE ORAL EVERY 12 HOURS SCHEDULED
Qty: 20 CAPSULE | Refills: 0 | Status: SHIPPED | OUTPATIENT
Start: 2021-12-23 | End: 2022-01-02

## 2022-06-23 ENCOUNTER — OFFICE VISIT (OUTPATIENT)
Dept: URGENT CARE | Facility: CLINIC | Age: 57
End: 2022-06-23
Payer: COMMERCIAL

## 2022-06-23 VITALS
HEART RATE: 80 BPM | RESPIRATION RATE: 18 BRPM | WEIGHT: 145 LBS | TEMPERATURE: 97.1 F | HEIGHT: 63 IN | BODY MASS INDEX: 25.69 KG/M2 | OXYGEN SATURATION: 97 %

## 2022-06-23 DIAGNOSIS — R30.0 DYSURIA: Primary | ICD-10-CM

## 2022-06-23 LAB
SL AMB  POCT GLUCOSE, UA: ABNORMAL
SL AMB LEUKOCYTE ESTERASE,UA: ABNORMAL
SL AMB POCT BILIRUBIN,UA: ABNORMAL
SL AMB POCT BLOOD,UA: ABNORMAL
SL AMB POCT CLARITY,UA: ABNORMAL
SL AMB POCT COLOR,UA: ABNORMAL
SL AMB POCT KETONES,UA: ABNORMAL
SL AMB POCT NITRITE,UA: ABNORMAL
SL AMB POCT PH,UA: 5
SL AMB POCT SPECIFIC GRAVITY,UA: 1.02
SL AMB POCT URINE PROTEIN: ABNORMAL
SL AMB POCT UROBILINOGEN: ABNORMAL

## 2022-06-23 PROCEDURE — 81002 URINALYSIS NONAUTO W/O SCOPE: CPT | Performed by: PHYSICIAN ASSISTANT

## 2022-06-23 PROCEDURE — 99213 OFFICE O/P EST LOW 20 MIN: CPT | Performed by: PHYSICIAN ASSISTANT

## 2022-06-23 PROCEDURE — 87086 URINE CULTURE/COLONY COUNT: CPT | Performed by: PHYSICIAN ASSISTANT

## 2022-06-23 RX ORDER — CEFDINIR 300 MG/1
300 CAPSULE ORAL EVERY 12 HOURS SCHEDULED
Qty: 14 CAPSULE | Refills: 0 | Status: SHIPPED | OUTPATIENT
Start: 2022-06-23 | End: 2022-06-30

## 2022-06-23 NOTE — PROGRESS NOTES
[facility]        NAME: Yoandy Luna is a 61 y o  female  : 1965    MRN: [id number]  DATE: 2022  TIME: 9:57 AM    Assessment and Plan   Dysuria [R30 0]  1  Dysuria  cefdinir (OMNICEF) 300 mg capsule         Patient Instructions   There are no Patient Instructions on file for this visit  Follow up with PCP in 3-5 days  Proceed to  ER if symptoms worsen  Chief Complaint     Chief Complaint   Patient presents with    Possible UTI     Frequency, urgency, irritation, bladder pressure, started 2 days ago         History of Present Illness       Patient presents with urinary frequency, urgency, and left flank pain for the past 2 days  Started off with just urinary frequency and progressively got worse  Denies significant back pain, fevers, muscle or body aches, vaginal discharge, vaginal bleeding  Has a history of both UTIs and kidney stones and patient states it feels more like a UTI to her  Requesting cefdinir as this has worked for her in the past       Review of Systems   Review of Systems   Constitutional: Negative for fatigue and fever  Gastrointestinal: Negative for abdominal pain  Genitourinary: Positive for dysuria, frequency, hematuria and urgency  Negative for difficulty urinating, vaginal bleeding and vaginal discharge  Musculoskeletal: Positive for back pain  Negative for arthralgias  Neurological: Negative for weakness  Psychiatric/Behavioral: Negative for confusion           Current Medications       Current Outpatient Medications:     cefdinir (OMNICEF) 300 mg capsule, Take 1 capsule (300 mg total) by mouth every 12 (twelve) hours for 7 days, Disp: 14 capsule, Rfl: 0    nicotine (NICODERM CQ) 14 mg/24hr TD 24 hr patch, Place 1 patch on the skin every 24 hours, Disp: 28 patch, Rfl: 5    acetaminophen (TYLENOL) 325 mg tablet, Take 650 mg by mouth every 6 (six) hours as needed for mild pain (Patient not taking: No sig reported), Disp: , Rfl:    benzonatate (TESSALON PERLES) 100 mg capsule, Take 1 capsule (100 mg total) by mouth 3 (three) times a day as needed for cough, Disp: 30 capsule, Rfl: 0    brompheniramine-pseudoephedrine-DM 30-2-10 MG/5ML syrup, Take 5 mL by mouth 4 (four) times a day as needed for congestion or cough (Patient not taking: Reported on 6/23/2022), Disp: 120 mL, Rfl: 0    Calcium Acetate, Phos Binder, (CALCIUM ACETATE PO), Take by mouth (Patient not taking: No sig reported), Disp: , Rfl:     cyclobenzaprine (FLEXERIL) 10 mg tablet, Take 1 tablet (10 mg total) by mouth 3 (three) times a day as needed for muscle spasms (Patient not taking: No sig reported), Disp: 7 tablet, Rfl: 0    Diclofenac Sodium (VOLTAREN) 1 %, Apply 2 g topically 4 (four) times a day (Patient not taking: Reported on 7/9/2021), Disp: 150 g, Rfl: 0    diclofenac sodium (VOLTAREN) 50 mg EC tablet, Take 1 tablet (50 mg total) by mouth 2 (two) times a day (Patient not taking: Reported on 4/8/2021), Disp: 60 tablet, Rfl: 0    Famotidine (PEPCID AC MAXIMUM STRENGTH PO), , Disp: , Rfl:     fluticasone (FLONASE) 50 mcg/act nasal spray, 1 spray into each nostril daily, Disp: 16 g, Rfl: 0    glucosamine-chondroitin 500-400 MG tablet, Take 1 tablet by mouth 3 (three) times a day (Patient not taking: Reported on 7/9/2021), Disp: , Rfl:     hydrocortisone 2 5 % cream, Apply topically 2 (two) times a day, Disp: 30 g, Rfl: 0    ibuprofen (MOTRIN) 800 mg tablet, Take 1 tablet (800 mg total) by mouth every 8 (eight) hours as needed for moderate pain (Patient not taking: Reported on 12/19/2020), Disp: 90 tablet, Rfl: 0    Current Allergies     Allergies as of 06/23/2022 - Reviewed 06/23/2022   Allergen Reaction Noted    Bactrim [sulfamethoxazole-trimethoprim] GI Intolerance 09/06/2017    Ciprofloxacin GI Intolerance 08/05/2015    Erythromycin GI Intolerance 09/06/2017    Erythromycin base  08/05/2015    Gluten meal - food allergy  03/20/2019    Ibuprofen GI Bleeding 2021    Lactose - food allergy  2019    Levaquin [levofloxacin] GI Intolerance 2017    Penicillins  2014    Tetracycline  2019    Valtrex [valacyclovir] Other (See Comments) 2017            The following portions of the patient's history were reviewed and updated as appropriate: allergies, current medications, past family history, past medical history, past social history, past surgical history and problem list      Past Medical History:   Diagnosis Date    Choledocholithiasis 2017    Colon polyp     GERD (gastroesophageal reflux disease)        Past Surgical History:   Procedure Laterality Date     SECTION      CHOLECYSTECTOMY      CHOLECYSTECTOMY LAPAROSCOPIC N/A 2017    Procedure: CHOLECYSTECTOMY LAPAROSCOPIC, with IOC, possible open;  Surgeon: Daniele Sanchez MD;  Location: [facility];  Service: General    DENTAL SURGERY      ERCP N/A 2017    Procedure: ENDOSCOPIC RETROGRADE CHOLANGIOPANCREATOGRAPHY (ERCP); Surgeon: Vj Oviedo MD;  Location[facility];  Service: Gastroenterology       Family History   Problem Relation Age of Onset    Hodgkin's lymphoma Mother     Cancer Father     Hypertension Father     Diabetes Father         of other type wth microalbuminuria, with long-term current use of insulin         Medications have been verified  Objective   Pulse 80   Temp (!) 97 1 °F (36 2 °C) (Temporal)   Resp 18   Ht 5' 3" (1 6 m)   Wt 65 8 kg (145 lb)   SpO2 97%   BMI 25 69 kg/m²        Physical Exam     Physical Exam  Constitutional:       Appearance: Normal appearance  Abdominal:      General: Abdomen is flat  Bowel sounds are normal       Palpations: Abdomen is soft  Tenderness: There is no abdominal tenderness  There is no right CVA tenderness, left CVA tenderness, guarding or rebound  Neurological:      Mental Status: She is alert     Psychiatric:         Mood and Affect: Mood normal          Behavior: Behavior normal

## 2022-06-24 LAB — BACTERIA UR CULT: NORMAL

## 2022-08-19 NOTE — PROGRESS NOTES
Daily Note     Today's date: 2021  Patient name: Emir Ortiz  : 1965  MRN: [id number]  Referring provider: Valentin Villaseñor DO  Dx:   Encounter Diagnosis     ICD-10-CM    1  Patellar instability of left knee  M25 362    2  Patellofemoral syndrome of both knees  M22 2X1     M22 2X2    3  Sprain of medial collateral ligament of left knee, subsequent encounter  S83 412D    4  Weakness of both hips  R29 898                   Subjective: ***      Objective: See treatment diary below      Assessment: Tolerated treatment well  Patient demonstrated fatigue post treatment and would benefit from continued PT  Plan: Continue per plan of care  Progress treatment as tolerated  Precautions: standard      Manuals 2/16 2/23 2/25 3/16 4/7        Reevaluation GR                                                   Neuro Re-Ed                                                                                                        Ther Ex             Long-sitting gastroc str  3x30"   3x30"         Heel slides 10x10"  30x 5" hold  30x   5" hold         Quad sets with heel prop 20x5"  20x 5"  30x  5" hold         Supine SLR 2x10  3" hold 2x 10 eccentric contol  3" hold 2x10 eccentric contol          TKE with TB             SAQ   20x  20x         Prone quad str     30" x 3  30"x3         Bike     10 min ROM         Ther Activity             Total gym: squats             Squats   10x  15x         FSU   10x 6"  15x 6"          FSD   10 x 6"  15x 6"         Gait Training                                       Modalities Bactrim Counseling:  I discussed with the patient the risks of sulfa antibiotics including but not limited to GI upset, allergic reaction, drug rash, diarrhea, dizziness, photosensitivity, and yeast infections.  Rarely, more serious reactions can occur including but not limited to aplastic anemia, agranulocytosis, methemoglobinemia, blood dyscrasias, liver or kidney failure, lung infiltrates or desquamative/blistering drug rashes.

## 2022-10-27 PROBLEM — H92.01 OTALGIA OF RIGHT EAR: Status: RESOLVED | Noted: 2020-12-21 | Resolved: 2022-10-27

## 2022-10-27 PROBLEM — M25.462 KNEE EFFUSION, LEFT: Status: RESOLVED | Noted: 2020-12-21 | Resolved: 2022-10-27

## 2022-10-27 PROBLEM — M25.562 ACUTE PAIN OF LEFT KNEE: Status: RESOLVED | Noted: 2020-11-04 | Resolved: 2022-10-27

## 2022-10-27 PROBLEM — Z23 NEED FOR TDAP VACCINATION: Status: RESOLVED | Noted: 2019-03-20 | Resolved: 2022-10-27

## 2022-10-27 PROBLEM — R39.9 UTI SYMPTOMS: Status: RESOLVED | Noted: 2020-02-11 | Resolved: 2022-10-27

## 2022-10-27 PROBLEM — R04.2 HEMOPTYSIS: Status: RESOLVED | Noted: 2020-04-05 | Resolved: 2022-10-27

## 2022-10-27 PROBLEM — M25.531 RIGHT WRIST PAIN: Status: RESOLVED | Noted: 2019-07-16 | Resolved: 2022-10-27

## 2022-10-27 PROBLEM — K90.41 GLUTEN INTOLERANCE: Status: ACTIVE | Noted: 2017-12-26

## 2022-10-27 PROBLEM — M75.21 BICEPS TENDINITIS OF RIGHT UPPER EXTREMITY: Status: RESOLVED | Noted: 2020-03-09 | Resolved: 2022-10-27

## 2022-10-27 PROBLEM — R19.5 POSITIVE FECAL OCCULT BLOOD TEST: Status: RESOLVED | Noted: 2020-04-15 | Resolved: 2022-10-27

## 2022-10-27 PROBLEM — IMO0001 TRANSITION OF CARE PERFORMED WITH SHARING OF CLINICAL SUMMARY: Status: RESOLVED | Noted: 2020-04-15 | Resolved: 2022-10-27

## 2022-10-27 PROBLEM — Z12.11 COLON CANCER SCREENING: Status: RESOLVED | Noted: 2020-02-11 | Resolved: 2022-10-27

## 2022-10-27 PROBLEM — J06.9 VIRAL UPPER RESPIRATORY TRACT INFECTION: Status: RESOLVED | Noted: 2020-07-28 | Resolved: 2022-10-27

## 2022-10-27 PROBLEM — Z11.59 NEED FOR HEPATITIS C SCREENING TEST: Status: RESOLVED | Noted: 2020-02-11 | Resolved: 2022-10-27

## 2022-10-27 PROBLEM — M77.8 DELTOID TENDONITIS OF LEFT SHOULDER: Status: RESOLVED | Noted: 2019-07-30 | Resolved: 2022-10-27

## 2022-10-27 PROBLEM — Z78.9 TRANSITION OF CARE PERFORMED WITH SHARING OF CLINICAL SUMMARY: Status: RESOLVED | Noted: 2020-04-15 | Resolved: 2022-10-27

## 2022-10-27 PROBLEM — J01.00 ACUTE NON-RECURRENT MAXILLARY SINUSITIS: Status: RESOLVED | Noted: 2018-09-07 | Resolved: 2022-10-27

## 2022-10-27 PROBLEM — R73.9 HYPERGLYCEMIA: Status: RESOLVED | Noted: 2020-02-11 | Resolved: 2022-10-27

## 2022-10-28 ENCOUNTER — OFFICE VISIT (OUTPATIENT)
Dept: FAMILY MEDICINE CLINIC | Facility: CLINIC | Age: 57
End: 2022-10-28
Payer: COMMERCIAL

## 2022-10-28 ENCOUNTER — TELEPHONE (OUTPATIENT)
Dept: ADMINISTRATIVE | Facility: OTHER | Age: 57
End: 2022-10-28

## 2022-10-28 ENCOUNTER — APPOINTMENT (OUTPATIENT)
Dept: LAB | Facility: CLINIC | Age: 57
End: 2022-10-28
Payer: COMMERCIAL

## 2022-10-28 VITALS
OXYGEN SATURATION: 97 % | HEART RATE: 82 BPM | DIASTOLIC BLOOD PRESSURE: 86 MMHG | SYSTOLIC BLOOD PRESSURE: 132 MMHG | BODY MASS INDEX: 25.52 KG/M2 | HEIGHT: 63 IN | TEMPERATURE: 97.8 F | WEIGHT: 144 LBS

## 2022-10-28 DIAGNOSIS — Z72.0 TOBACCO USE: ICD-10-CM

## 2022-10-28 DIAGNOSIS — Z00.00 ANNUAL PHYSICAL EXAM: Primary | ICD-10-CM

## 2022-10-28 DIAGNOSIS — Z13.220 SCREENING FOR LIPID DISORDERS: ICD-10-CM

## 2022-10-28 DIAGNOSIS — R53.82 CHRONIC FATIGUE: ICD-10-CM

## 2022-10-28 DIAGNOSIS — Z12.2 SCREENING FOR LUNG CANCER: ICD-10-CM

## 2022-10-28 DIAGNOSIS — Z13.1 SCREENING FOR DIABETES MELLITUS: ICD-10-CM

## 2022-10-28 DIAGNOSIS — M62.830 BACK MUSCLE SPASM: ICD-10-CM

## 2022-10-28 PROBLEM — R93.89 ABNORMAL CT SCAN, CHEST: Status: RESOLVED | Noted: 2020-04-27 | Resolved: 2022-10-28

## 2022-10-28 LAB
ALBUMIN SERPL BCP-MCNC: 4.2 G/DL (ref 3.5–5)
ALP SERPL-CCNC: 88 U/L (ref 46–116)
ALT SERPL W P-5'-P-CCNC: 29 U/L (ref 12–78)
ANION GAP SERPL CALCULATED.3IONS-SCNC: 4 MMOL/L (ref 4–13)
AST SERPL W P-5'-P-CCNC: 19 U/L (ref 5–45)
BASOPHILS # BLD AUTO: 0.09 THOUSANDS/ÂΜL (ref 0–0.1)
BASOPHILS NFR BLD AUTO: 1 % (ref 0–1)
BILIRUB SERPL-MCNC: 0.51 MG/DL (ref 0.2–1)
BUN SERPL-MCNC: 20 MG/DL (ref 5–25)
CALCIUM SERPL-MCNC: 9.8 MG/DL (ref 8.3–10.1)
CHLORIDE SERPL-SCNC: 106 MMOL/L (ref 96–108)
CHOLEST SERPL-MCNC: 188 MG/DL
CO2 SERPL-SCNC: 28 MMOL/L (ref 21–32)
CREAT SERPL-MCNC: 0.92 MG/DL (ref 0.6–1.3)
EOSINOPHIL # BLD AUTO: 0.14 THOUSAND/ÂΜL (ref 0–0.61)
EOSINOPHIL NFR BLD AUTO: 2 % (ref 0–6)
ERYTHROCYTE [DISTWIDTH] IN BLOOD BY AUTOMATED COUNT: 14.6 % (ref 11.6–15.1)
GFR SERPL CREATININE-BSD FRML MDRD: 68 ML/MIN/1.73SQ M
GLUCOSE P FAST SERPL-MCNC: 99 MG/DL (ref 65–99)
HCT VFR BLD AUTO: 43.3 % (ref 34.8–46.1)
HDLC SERPL-MCNC: 90 MG/DL
HGB BLD-MCNC: 14.2 G/DL (ref 11.5–15.4)
IMM GRANULOCYTES # BLD AUTO: 0.03 THOUSAND/UL (ref 0–0.2)
IMM GRANULOCYTES NFR BLD AUTO: 0 % (ref 0–2)
LDLC SERPL CALC-MCNC: 86 MG/DL (ref 0–100)
LYMPHOCYTES # BLD AUTO: 1.73 THOUSANDS/ÂΜL (ref 0.6–4.47)
LYMPHOCYTES NFR BLD AUTO: 19 % (ref 14–44)
MCH RBC QN AUTO: 32.2 PG (ref 26.8–34.3)
MCHC RBC AUTO-ENTMCNC: 32.8 G/DL (ref 31.4–37.4)
MCV RBC AUTO: 98 FL (ref 82–98)
MONOCYTES # BLD AUTO: 0.74 THOUSAND/ÂΜL (ref 0.17–1.22)
MONOCYTES NFR BLD AUTO: 8 % (ref 4–12)
NEUTROPHILS # BLD AUTO: 6.51 THOUSANDS/ÂΜL (ref 1.85–7.62)
NEUTS SEG NFR BLD AUTO: 70 % (ref 43–75)
NRBC BLD AUTO-RTO: 0 /100 WBCS
PLATELET # BLD AUTO: 261 THOUSANDS/UL (ref 149–390)
PMV BLD AUTO: 10.6 FL (ref 8.9–12.7)
POTASSIUM SERPL-SCNC: 4.3 MMOL/L (ref 3.5–5.3)
PROT SERPL-MCNC: 8.3 G/DL (ref 6.4–8.4)
RBC # BLD AUTO: 4.41 MILLION/UL (ref 3.81–5.12)
SODIUM SERPL-SCNC: 138 MMOL/L (ref 135–147)
TRIGL SERPL-MCNC: 58 MG/DL
TSH SERPL DL<=0.05 MIU/L-ACNC: 0.65 UIU/ML (ref 0.45–4.5)
WBC # BLD AUTO: 9.24 THOUSAND/UL (ref 4.31–10.16)

## 2022-10-28 PROCEDURE — 80053 COMPREHEN METABOLIC PANEL: CPT

## 2022-10-28 PROCEDURE — 99396 PREV VISIT EST AGE 40-64: CPT | Performed by: NURSE PRACTITIONER

## 2022-10-28 PROCEDURE — 85025 COMPLETE CBC W/AUTO DIFF WBC: CPT

## 2022-10-28 PROCEDURE — 36415 COLL VENOUS BLD VENIPUNCTURE: CPT

## 2022-10-28 PROCEDURE — 80061 LIPID PANEL: CPT

## 2022-10-28 PROCEDURE — 84443 ASSAY THYROID STIM HORMONE: CPT

## 2022-10-28 RX ORDER — NICOTINE 21 MG/24H
1 PATCH, EXTENDED RELEASE TRANSDERMAL EVERY 24 HOURS
Qty: 28 PATCH | Refills: 0 | Status: SHIPPED | OUTPATIENT
Start: 2022-10-28

## 2022-10-28 RX ORDER — CYCLOBENZAPRINE HCL 10 MG
10 TABLET ORAL 3 TIMES DAILY PRN
Qty: 45 TABLET | Refills: 0 | Status: SHIPPED | OUTPATIENT
Start: 2022-10-28 | End: 2022-11-12

## 2022-10-28 NOTE — TELEPHONE ENCOUNTER
----- Message from Rickie Gomez sent at 10/28/2022  7:52 AM EDT -----  Regarding: PAP and Mammo  10/28/22 7:52 AM    Hello, our patient Forrest Pulido has had Mammogram and Pap Smear (HPV) aka Cervical Cancer Screening completed/performed  Please assist in updating the patient chart by pulling the Care Everywhere (CE) document  The date of service is mammo 9/26/22 and PAP 11/16/21       Thank you,  Rickie [facility]

## 2022-10-28 NOTE — PROGRESS NOTES
[facility]    NAME: Nilson Ledesma  AGE: 61 y o  SEX: female  : 1965     DATE: 10/28/2022     Assessment and Plan:     Problem List Items Addressed This Visit        Other    BMI 25 0-25 9,adult    Tobacco use    Relevant Medications    Nicoderm CQ 21 MG/24HR TD 24 hr patch    Annual physical exam - Primary    Back muscle spasm    Relevant Medications    cyclobenzaprine (FLEXERIL) 10 mg tablet    Screening for lung cancer    Relevant Orders    CT lung screening program    Chronic fatigue    Relevant Orders    Comprehensive metabolic panel    CBC and differential    TSH, 3rd generation with Free T4 reflex    Screening for lipid disorders    Relevant Orders    Lipid Panel with Direct LDL reflex    Screening for diabetes mellitus    Relevant Orders    Comprehensive metabolic panel          Immunizations and preventive care screenings were discussed with patient today  Appropriate education was printed on patient's after visit summary  Counseling:  Dental Health: discussed importance of regular tooth brushing, flossing, and dental visits  Exercise: the importance of regular exercise/physical activity was discussed  Recommend exercise 3-5 times per week for at least 30 minutes  BMI Counseling: Body mass index is 25 51 kg/m²  The BMI is above normal  Nutrition recommendations include decreasing portion sizes, encouraging healthy choices of fruits and vegetables, decreasing fast food intake, consuming healthier snacks, limiting drinks that contain sugar, moderation in carbohydrate intake, increasing intake of lean protein, reducing intake of saturated and trans fat and reducing intake of cholesterol  Exercise recommendations include moderate physical activity 150 minutes/week  No pharmacotherapy was ordered  Patient referred to PCP  Rationale for BMI follow-up plan is due to patient being overweight or obese       Depression Screening and Follow-up Plan: Patient was screened for depression during today's encounter  They screened negative with a PHQ-2 score of 0  Tobacco Cessation Counseling: Tobacco cessation counseling was provided  The patient is sincerely urged to quit consumption of tobacco  She is not ready to quit tobacco          No follow-ups on file  Chief Complaint:     Chief Complaint   Patient presents with   • Follow-up     Medication refills      History of Present Illness:     Adult Annual Physical   Patient here for a comprehensive physical exam  The patient reports no problems  Diet and Physical Activity  Diet/Nutrition: well balanced diet  Gluten free and lactose   Exercise: walking and 3-4 times a week on average  Depression Screening  PHQ-2/9 Depression Screening    Little interest or pleasure in doing things: 0 - not at all  Feeling down, depressed, or hopeless: 0 - not at all  PHQ-2 Score: 0  PHQ-2 Interpretation: Negative depression screen       General Health  Sleep: sleeps well and gets 7-8 hours of sleep on average  Hearing: normal - bilateral   Vision: goes for regular eye exams, most recent eye exam <1 year ago and wears glasses  Dental: regular dental visits, brushes teeth once daily and does not brush teeth regularly  /GYN Health  Patient is: postmenopausal  Last menstrual period: 15 years   Contraceptive method: none  Review of Systems:     Review of Systems   Constitutional: Negative for activity change, appetite change, chills, diaphoresis, fatigue, fever and unexpected weight change  HENT: Positive for rhinorrhea  Negative for congestion, ear pain, hearing loss, postnasal drip, sinus pressure, sinus pain, sneezing and sore throat  Eyes: Negative for pain, redness and visual disturbance  Respiratory: Negative for cough and shortness of breath  Cardiovascular: Negative for chest pain and leg swelling     Gastrointestinal: Negative for abdominal pain, diarrhea, nausea and vomiting  Endocrine: Negative  Genitourinary: Negative  Musculoskeletal: Negative for arthralgias  Skin: Negative  Allergic/Immunologic: Negative  Neurological: Negative for dizziness and light-headedness  Hematological: Negative  Psychiatric/Behavioral: Negative for behavioral problems and dysphoric mood  Past Medical History:     Past Medical History:   Diagnosis Date   • Choledocholithiasis 2017   • Colon polyp    • GERD (gastroesophageal reflux disease)       Past Surgical History:     Past Surgical History:   Procedure Laterality Date   •  SECTION     • CHOLECYSTECTOMY     • CHOLECYSTECTOMY LAPAROSCOPIC N/A 2017    Procedure: CHOLECYSTECTOMY LAPAROSCOPIC, with IOC, possible open;  Surgeon: Onel Chaparro MD;  [facility];  Service: General   • DENTAL SURGERY     • ERCP N/A 2017    Procedure: ENDOSCOPIC RETROGRADE CHOLANGIOPANCREATOGRAPHY (ERCP); Surgeon: Mati Mccormick MD;  Location: [facility];  Service: Gastroenterology      Social History:     Social History     Socioeconomic History   • Marital status: /Civil Union     Spouse name: None   • Number of children: None   • Years of education: None   • Highest education level: None   Occupational History   • None   Tobacco Use   • Smoking status: Current Some Day Smoker     Packs/day: 0 20     Types: Cigarettes   • Smokeless tobacco: Never Used   Vaping Use   • Vaping Use: Never used   Substance and Sexual Activity   • Alcohol use:  Yes     Alcohol/week: 1 0 standard drink     Types: 1 Shots of liquor per week     Comment: every night   • Drug use: No   • Sexual activity: None   Other Topics Concern   • None   Social History Narrative   • None     Social Determinants of Health     Financial Resource Strain: Not on file   Food Insecurity: Not on file   Transportation Needs: Not on file   Physical Activity: Not on file   Stress: Not on file   Social Connections: Not on file Intimate Partner Violence: Not on file   Housing Stability: Not on file      Family History:     Family History   Problem Relation Age of Onset   • Hodgkin's lymphoma Mother    • Cancer Father    • Hypertension Father    • Diabetes Father         of other type wth microalbuminuria, with long-term current use of insulin      Current Medications:     Current Outpatient Medications   Medication Sig Dispense Refill   • cyclobenzaprine (FLEXERIL) 10 mg tablet Take 1 tablet (10 mg total) by mouth 3 (three) times a day as needed for muscle spasms for up to 15 days 45 tablet 0   • Nicoderm CQ 21 MG/24HR TD 24 hr patch Place 1 patch on the skin every 24 hours 28 patch 0     No current facility-administered medications for this visit  Allergies: Allergies   Allergen Reactions   • Bactrim [Sulfamethoxazole-Trimethoprim] GI Intolerance   • Ciprofloxacin GI Intolerance     Other reaction(s): CIPROFLOXACIN (CIPRO)   • Erythromycin GI Intolerance   • Erythromycin Base      Other reaction(s): ETHYL ALCOHOL (ERYTHROMYCIN) (SOB)   • Gluten Meal - Food Allergy    • Ibuprofen GI Bleeding   • Lactose - Food Allergy    • Levaquin [Levofloxacin] GI Intolerance   • Penicillins    • Tetracycline    • Valtrex [Valacyclovir] Other (See Comments)     neuropathy      Physical Exam:     /86 (BP Location: Left arm, Patient Position: Sitting)   Pulse 82   Temp 97 8 °F (36 6 °C) (Temporal)   Ht 5' 3" (1 6 m)   Wt 65 3 kg (144 lb)   SpO2 97%   BMI 25 51 kg/m²     Physical Exam  Vitals and nursing note reviewed  Constitutional:       General: She is not in acute distress  Appearance: She is well-developed  HENT:      Head: Normocephalic and atraumatic        Right Ear: Tympanic membrane normal       Left Ear: Tympanic membrane normal       Nose: Nose normal       Mouth/Throat:      Mouth: Mucous membranes are moist    Eyes:      Conjunctiva/sclera: Conjunctivae normal    Cardiovascular:      Rate and Rhythm: Normal rate and regular rhythm  Heart sounds: No murmur heard  Pulmonary:      Effort: Pulmonary effort is normal  No respiratory distress  Breath sounds: Normal breath sounds  Abdominal:      Palpations: Abdomen is soft  Tenderness: There is no abdominal tenderness  Musculoskeletal:         General: Normal range of motion  Cervical back: Neck supple  Skin:     General: Skin is warm and dry  Neurological:      General: No focal deficit present  Mental Status: She is alert and oriented to person, place, and time     Psychiatric:         Mood and Affect: Mood normal           Leni Newton, [address]

## 2022-10-28 NOTE — PATIENT INSTRUCTIONS

## 2022-11-01 NOTE — TELEPHONE ENCOUNTER
Upon review of the In Basket request we were able to locate, review, and update the patient chart as requested for Mammogram and Pap Smear (HPV) aka Cervical Cancer Screening  Any additional questions or concerns should be emailed to the Practice Liaisons via the appropriate education email address, please do not reply via In Basket      Thank you  Noemí Cason

## 2022-11-02 ENCOUNTER — TELEPHONE (OUTPATIENT)
Dept: FAMILY MEDICINE CLINIC | Facility: CLINIC | Age: 57
End: 2022-11-02

## 2022-11-02 DIAGNOSIS — N30.00 ACUTE CYSTITIS WITHOUT HEMATURIA: Primary | ICD-10-CM

## 2022-11-02 RX ORDER — NITROFURANTOIN 25; 75 MG/1; MG/1
100 CAPSULE ORAL 2 TIMES DAILY
Qty: 10 CAPSULE | Refills: 0 | Status: SHIPPED | OUTPATIENT
Start: 2022-11-02 | End: 2022-11-07

## 2022-11-02 NOTE — TELEPHONE ENCOUNTER
Pt just saw Aniket Tucker and she started with a UTI yesterday    very uncomfortable w/the symptoms and is asking if Aniket Mccabe would call in a ABX  Franklyn Tucker she is going away for the weekend and wants to get rid of it before she leaves  Franklyn Tucker

## 2022-12-27 PROBLEM — Z13.1 SCREENING FOR DIABETES MELLITUS: Status: RESOLVED | Noted: 2022-10-28 | Resolved: 2022-12-27

## 2022-12-27 PROBLEM — Z13.220 SCREENING FOR LIPID DISORDERS: Status: RESOLVED | Noted: 2022-10-28 | Resolved: 2022-12-27

## 2022-12-29 DIAGNOSIS — Z72.0 TOBACCO USE: ICD-10-CM

## 2022-12-29 RX ORDER — NICOTINE 21 MG/24H
1 PATCH, EXTENDED RELEASE TRANSDERMAL EVERY 24 HOURS
Qty: 28 PATCH | Refills: 0 | Status: SHIPPED | OUTPATIENT
Start: 2022-12-29

## 2023-01-24 ENCOUNTER — TELEPHONE (OUTPATIENT)
Dept: FAMILY MEDICINE CLINIC | Facility: CLINIC | Age: 58
End: 2023-01-24

## 2023-01-24 DIAGNOSIS — Z72.0 TOBACCO USE: Primary | ICD-10-CM

## 2023-01-24 NOTE — TELEPHONE ENCOUNTER
Harmeet Moser is no longer taking the nicotine patches  She would like a prescription for nicotine gum   Nicorette Gum 4 mg to [facility] [place]

## 2023-04-27 DIAGNOSIS — Z72.0 TOBACCO USE: ICD-10-CM

## 2023-07-03 DIAGNOSIS — Z72.0 TOBACCO USE: ICD-10-CM

## 2023-07-03 RX ORDER — NICOTINE POLACRILEX 4 MG/1
GUM, CHEWING ORAL
Qty: 100 EACH | Refills: 1 | Status: SHIPPED | OUTPATIENT
Start: 2023-07-03

## 2023-08-30 ENCOUNTER — OFFICE VISIT (OUTPATIENT)
Dept: FAMILY MEDICINE CLINIC | Facility: CLINIC | Age: 58
End: 2023-08-30
Payer: COMMERCIAL

## 2023-08-30 VITALS
BODY MASS INDEX: 25.69 KG/M2 | OXYGEN SATURATION: 97 % | HEART RATE: 88 BPM | DIASTOLIC BLOOD PRESSURE: 90 MMHG | SYSTOLIC BLOOD PRESSURE: 134 MMHG | WEIGHT: 145 LBS | HEIGHT: 63 IN | TEMPERATURE: 98.7 F

## 2023-08-30 DIAGNOSIS — R31.9 HEMATURIA, UNSPECIFIED TYPE: ICD-10-CM

## 2023-08-30 DIAGNOSIS — R39.9 UTI SYMPTOMS: Primary | ICD-10-CM

## 2023-08-30 DIAGNOSIS — B37.9 YEAST INFECTION: ICD-10-CM

## 2023-08-30 LAB
SL AMB  POCT GLUCOSE, UA: NORMAL
SL AMB LEUKOCYTE ESTERASE,UA: NORMAL
SL AMB POCT BILIRUBIN,UA: NORMAL
SL AMB POCT BLOOD,UA: NORMAL
SL AMB POCT KETONES,UA: NORMAL
SL AMB POCT NITRITE,UA: NORMAL
SL AMB POCT PH,UA: 6.5
SL AMB POCT SPECIFIC GRAVITY,UA: 1.01
SL AMB POCT URINE PROTEIN: NORMAL
SL AMB POCT UROBILINOGEN: NORMAL

## 2023-08-30 PROCEDURE — 99214 OFFICE O/P EST MOD 30 MIN: CPT | Performed by: FAMILY MEDICINE

## 2023-08-30 PROCEDURE — 87086 URINE CULTURE/COLONY COUNT: CPT | Performed by: FAMILY MEDICINE

## 2023-08-30 PROCEDURE — 81002 URINALYSIS NONAUTO W/O SCOPE: CPT | Performed by: FAMILY MEDICINE

## 2023-08-30 RX ORDER — FLUCONAZOLE 150 MG/1
150 TABLET ORAL DAILY
Qty: 3 TABLET | Refills: 1 | Status: SHIPPED | OUTPATIENT
Start: 2023-08-30 | End: 2023-09-02

## 2023-08-30 RX ORDER — CEFDINIR 300 MG/1
CAPSULE ORAL
COMMUNITY
Start: 2023-08-23

## 2023-08-30 NOTE — PROGRESS NOTES
Name: Pam Mitchell      : 1965      MRN: [id number]  Encounter Provider: Savannah Crow MD  Encounter Date: 2023   Encounter department: [address] [place] [address]     1. UTI symptoms  -     POCT urine dip  -     Urine culture; Future  -     Urine culture    2. Hematuria, unspecified type  -     US kidney and bladder; Future; Expected date: 2023    3. Yeast infection  -     fluconazole (DIFLUCAN) 150 mg tablet; Take 1 tablet (150 mg total) by mouth daily for 3 doses    Follow up as needed       Subjective     Patient is here because she has been having slight burning sensation from the out side of "where you pee". She also had lower abdominal pain. She took a course of cefdinir prescribed by online MD. She only had partial relief of symptoms from this. subsequently she applied a topical anti fungal cream clotrimazole which helped further. Has a hx of kidney stones had 1 episode of hematuria. Review of Systems   Constitutional: Negative for activity change, appetite change, fatigue and fever. HENT: Negative for congestion and ear discharge. Respiratory: Negative for cough and shortness of breath. Cardiovascular: Negative for chest pain and palpitations. Gastrointestinal: Negative for diarrhea and nausea. Genitourinary: Positive for dysuria and hematuria. Negative for flank pain. Musculoskeletal: Negative for arthralgias and back pain. Skin: Negative for color change and rash. Neurological: Negative for dizziness and headaches. Psychiatric/Behavioral: Negative for agitation and behavioral problems.        Past Medical History:   Diagnosis Date   • Choledocholithiasis 2017   • Colon polyp    • GERD (gastroesophageal reflux disease)      Past Surgical History:   Procedure Laterality Date   •  SECTION     • CHOLECYSTECTOMY     • CHOLECYSTECTOMY LAPAROSCOPIC N/A 2017    Procedure: CHOLECYSTECTOMY LAPAROSCOPIC, with IOC, possible open; Surgeon: Hildy Barthel, MD;  [facility];  Service: General   • DENTAL SURGERY     • ERCP N/A 9/7/2017    Procedure: ENDOSCOPIC RETROGRADE CHOLANGIOPANCREATOGRAPHY (ERCP); Surgeon: Jonathan Montenegro MD;  [facility];  Service: Gastroenterology     Family History   Problem Relation Age of Onset   • Hodgkin's lymphoma Mother    • Cancer Father    • Hypertension Father    • Diabetes Father         of other type wth microalbuminuria, with long-term current use of insulin     Social History     Socioeconomic History   • Marital status: /Civil Union     Spouse name: None   • Number of children: None   • Years of education: None   • Highest education level: None   Occupational History   • None   Tobacco Use   • Smoking status: Some Days     Packs/day: 0.20     Types: Cigarettes   • Smokeless tobacco: Never   Vaping Use   • Vaping Use: Never used   Substance and Sexual Activity   • Alcohol use:  Yes     Alcohol/week: 1.0 standard drink of alcohol     Types: 1 Shots of liquor per week     Comment: every night   • Drug use: No   • Sexual activity: None   Other Topics Concern   • None   Social History Narrative   • None     Social Determinants of Health     Financial Resource Strain: Not on file   Food Insecurity: Not on file   Transportation Needs: Not on file   Physical Activity: Not on file   Stress: Not on file   Social Connections: Not on file   Intimate Partner Violence: Not on file   Housing Stability: Not on file     Current Outpatient Medications on File Prior to Visit   Medication Sig   • cefdinir (OMNICEF) 300 mg capsule take 1 capsule by mouth every 12 hours for 5 days   • cyclobenzaprine (FLEXERIL) 10 mg tablet Take 1 tablet (10 mg total) by mouth 3 (three) times a day as needed for muscle spasms for up to 15 days   • Nicorette 4 MG gum chew 1 GUM if needed for SMOKING CESSATION     Allergies   Allergen Reactions   • Bactrim [Sulfamethoxazole-Trimethoprim] GI Intolerance   • Ciprofloxacin GI Intolerance     Other reaction(s): CIPROFLOXACIN (CIPRO)   • Erythromycin GI Intolerance   • Erythromycin Base      Other reaction(s): ETHYL ALCOHOL (ERYTHROMYCIN) (SOB)   • Gluten Meal - Food Allergy    • Ibuprofen GI Bleeding   • Lactose - Food Allergy    • Levaquin [Levofloxacin] GI Intolerance   • Penicillins    • Tetracycline    • Valtrex [Valacyclovir] Other (See Comments)     neuropathy     Immunization History   Administered Date(s) Administered   • Tdap 03/20/2019       Objective     /90 (BP Location: Left arm, Patient Position: Sitting, Cuff Size: Large)   Pulse 88   Temp 98.7 °F (37.1 °C)   Ht 5' 3" (1.6 m)   Wt 65.8 kg (145 lb)   SpO2 97%   BMI 25.69 kg/m²     Physical Exam  Constitutional:       General: She is not in acute distress. Appearance: She is well-developed. She is not diaphoretic. HENT:      Head: Normocephalic and atraumatic. Nose: Nose normal.   Eyes:      Conjunctiva/sclera: Conjunctivae normal.      Pupils: Pupils are equal, round, and reactive to light. Cardiovascular:      Rate and Rhythm: Normal rate and regular rhythm. Heart sounds: Normal heart sounds. No murmur heard. Pulmonary:      Effort: Pulmonary effort is normal. No respiratory distress. Breath sounds: Normal breath sounds. No wheezing. Abdominal:      General: Bowel sounds are normal. There is no distension. Palpations: Abdomen is soft. Tenderness: There is no abdominal tenderness. Skin:     General: Skin is warm and dry. Findings: No erythema or rash. Neurological:      Mental Status: She is alert and oriented to person, place, and time.        Eduardo Bolivar MD

## 2023-09-01 LAB — BACTERIA UR CULT: NORMAL

## 2023-09-02 ENCOUNTER — HOSPITAL ENCOUNTER (OUTPATIENT)
Dept: ULTRASOUND IMAGING | Facility: HOSPITAL | Age: 58
Discharge: HOME | End: 2023-09-02
Attending: FAMILY MEDICINE
Payer: COMMERCIAL

## 2023-09-02 DIAGNOSIS — R31.9 HEMATURIA, UNSPECIFIED TYPE: ICD-10-CM

## 2023-09-02 PROCEDURE — 76775 US EXAM ABDO BACK WALL LIM: CPT

## 2023-11-02 DIAGNOSIS — Z72.0 TOBACCO USE: ICD-10-CM

## 2023-12-04 ENCOUNTER — APPOINTMENT (OUTPATIENT)
Dept: RADIOLOGY | Facility: CLINIC | Age: 58
End: 2023-12-04
Payer: COMMERCIAL

## 2023-12-04 ENCOUNTER — OFFICE VISIT (OUTPATIENT)
Dept: FAMILY MEDICINE CLINIC | Facility: CLINIC | Age: 58
End: 2023-12-04
Payer: COMMERCIAL

## 2023-12-04 VITALS
BODY MASS INDEX: 27.82 KG/M2 | HEIGHT: 63 IN | OXYGEN SATURATION: 97 % | WEIGHT: 157 LBS | DIASTOLIC BLOOD PRESSURE: 86 MMHG | HEART RATE: 95 BPM | SYSTOLIC BLOOD PRESSURE: 128 MMHG | TEMPERATURE: 98.6 F

## 2023-12-04 DIAGNOSIS — M54.6 ACUTE MIDLINE THORACIC BACK PAIN: ICD-10-CM

## 2023-12-04 DIAGNOSIS — M62.830 BACK MUSCLE SPASM: ICD-10-CM

## 2023-12-04 DIAGNOSIS — M54.6 ACUTE MIDLINE THORACIC BACK PAIN: Primary | ICD-10-CM

## 2023-12-04 PROBLEM — R39.9 UTI SYMPTOMS: Status: RESOLVED | Noted: 2020-02-11 | Resolved: 2023-12-04

## 2023-12-04 PROCEDURE — 72072 X-RAY EXAM THORAC SPINE 3VWS: CPT

## 2023-12-04 PROCEDURE — 99213 OFFICE O/P EST LOW 20 MIN: CPT | Performed by: NURSE PRACTITIONER

## 2023-12-04 RX ORDER — CYCLOBENZAPRINE HCL 10 MG
10 TABLET ORAL 3 TIMES DAILY PRN
Qty: 45 TABLET | Refills: 0 | Status: SHIPPED | OUTPATIENT
Start: 2023-12-04 | End: 2023-12-19

## 2023-12-04 RX ORDER — LIDOCAINE 50 MG/G
1 PATCH TOPICAL DAILY
Qty: 15 PATCH | Refills: 0 | Status: SHIPPED | OUTPATIENT
Start: 2023-12-04 | End: 2023-12-19

## 2023-12-04 NOTE — PROGRESS NOTES
Name: Capo Hyde      : 1965      MRN: [id number]  Encounter Provider: SULEIMAN Gutierrez  Encounter Date: 2023   Encounter department: [address] [place] [address]. Acute midline thoracic back pain  Assessment & Plan:  Thoracic x-ray ordered     Orders:  -     XR spine thoracic 3 vw; Future; Expected date: 2023  -     lidocaine (Lidoderm) 5 %; Apply 1 patch topically over 12 hours daily for 15 days Remove & Discard patch within 12 hours or as directed by MD    2. Back muscle spasm  Assessment & Plan:  DW patient using the Tylenol and prn flexeril and exercises discussed with patient     Orders:  -     cyclobenzaprine (FLEXERIL) 10 mg tablet; Take 1 tablet (10 mg total) by mouth 3 (three) times a day as needed for muscle spasms for up to 15 days  -     lidocaine (Lidoderm) 5 %; Apply 1 patch topically over 12 hours daily for 15 days Remove & Discard patch within 12 hours or as directed by MD      BMI Counseling: Body mass index is 27.81 kg/m². The BMI is above normal. Nutrition recommendations include decreasing portion sizes, encouraging healthy choices of fruits and vegetables, decreasing fast food intake, consuming healthier snacks, limiting drinks that contain sugar and moderation in carbohydrate intake. Exercise recommendations include moderate physical activity 150 minutes/week. No pharmacotherapy was ordered. Patient referred to PCP. Rationale for BMI follow-up plan is due to patient being overweight or obese.          Subjective      Patient here today and rpeorts that she is having issues with her back and present since  and had sciatica and had a pain that travels down her left leg and took muscle relaxers and massage therapist and next day was sore but got better and leg pain improved and she had been babying her back and now suddenly it has flared up again and is feeling like an upside down T and having pain in her middle of her back and using tylenol and using ice. Review of Systems   Constitutional:  Negative for activity change, appetite change, chills, diaphoresis, fatigue, fever and unexpected weight change. HENT:  Negative for congestion, ear pain, hearing loss, postnasal drip, sinus pressure, sinus pain, sneezing and sore throat. Eyes:  Negative for pain, redness and visual disturbance. Respiratory:  Negative for cough and shortness of breath. Cardiovascular:  Negative for chest pain and leg swelling. Gastrointestinal:  Negative for abdominal pain, diarrhea, nausea and vomiting. Endocrine: Negative. Genitourinary: Negative. Musculoskeletal:  Positive for arthralgias, back pain and myalgias. Neurological:  Negative for dizziness and light-headedness. Psychiatric/Behavioral:  Negative for behavioral problems and dysphoric mood. Current Outpatient Medications on File Prior to Visit   Medication Sig   • nicotine polacrilex (Nicorette) 4 mg gum Chew 1 each (4 mg total) in the morning   • [DISCONTINUED] cefdinir (OMNICEF) 300 mg capsule take 1 capsule by mouth every 12 hours for 5 days   • [DISCONTINUED] cyclobenzaprine (FLEXERIL) 10 mg tablet Take 1 tablet (10 mg total) by mouth 3 (three) times a day as needed for muscle spasms for up to 15 days       Objective     /86 (BP Location: Left arm, Patient Position: Sitting, Cuff Size: Large)   Pulse 95   Temp 98.6 °F (37 °C)   Ht 5' 3" (1.6 m)   Wt 71.2 kg (157 lb)   SpO2 97%   BMI 27.81 kg/m²     Physical Exam  Constitutional:       General: She is not in acute distress. Appearance: She is well-developed. HENT:      Head: Normocephalic and atraumatic. Right Ear: Tympanic membrane normal.      Left Ear: Tympanic membrane normal.      Nose: Nose normal.      Mouth/Throat:      Mouth: Mucous membranes are moist.   Eyes:      Pupils: Pupils are equal, round, and reactive to light. Neck:      Thyroid: No thyromegaly.    Cardiovascular: Rate and Rhythm: Normal rate and regular rhythm. Heart sounds: Normal heart sounds. No murmur heard. Pulmonary:      Effort: Pulmonary effort is normal. No respiratory distress. Breath sounds: Normal breath sounds. No wheezing. Abdominal:      General: Bowel sounds are normal.      Palpations: Abdomen is soft. Musculoskeletal:         General: Normal range of motion. Cervical back: Normal range of motion. Thoracic back: Spasms and tenderness present. Back:    Skin:     General: Skin is warm and dry. Neurological:      Mental Status: She is alert and oriented to person, place, and time.        Tyler Felipe

## 2024-01-17 DIAGNOSIS — Z72.0 TOBACCO USE: ICD-10-CM

## 2024-01-17 DIAGNOSIS — M62.830 BACK MUSCLE SPASM: ICD-10-CM

## 2024-01-17 RX ORDER — CYCLOBENZAPRINE HCL 10 MG
10 TABLET ORAL 3 TIMES DAILY PRN
Qty: 45 TABLET | Refills: 0 | Status: SHIPPED | OUTPATIENT
Start: 2024-01-17 | End: 2024-02-01

## 2024-02-06 DIAGNOSIS — Z72.0 TOBACCO USE: ICD-10-CM

## 2024-03-11 DIAGNOSIS — M62.830 BACK MUSCLE SPASM: ICD-10-CM

## 2024-03-11 DIAGNOSIS — Z72.0 TOBACCO USE: ICD-10-CM

## 2024-03-11 RX ORDER — CYCLOBENZAPRINE HCL 10 MG
10 TABLET ORAL 3 TIMES DAILY PRN
Qty: 45 TABLET | Refills: 0 | Status: SHIPPED | OUTPATIENT
Start: 2024-03-11 | End: 2024-03-26

## 2024-04-05 DIAGNOSIS — Z72.0 TOBACCO USE: ICD-10-CM

## 2024-04-18 DIAGNOSIS — M62.830 BACK MUSCLE SPASM: ICD-10-CM

## 2024-04-18 RX ORDER — CYCLOBENZAPRINE HCL 10 MG
10 TABLET ORAL 3 TIMES DAILY PRN
Qty: 45 TABLET | Refills: 0 | Status: SHIPPED | OUTPATIENT
Start: 2024-04-18 | End: 2024-05-03

## 2024-06-05 DIAGNOSIS — M62.830 BACK MUSCLE SPASM: ICD-10-CM

## 2024-06-06 RX ORDER — CYCLOBENZAPRINE HCL 10 MG
10 TABLET ORAL 3 TIMES DAILY PRN
Qty: 45 TABLET | Refills: 0 | Status: SHIPPED | OUTPATIENT
Start: 2024-06-06 | End: 2024-06-21

## 2024-07-22 DIAGNOSIS — M62.830 BACK MUSCLE SPASM: ICD-10-CM

## 2024-07-22 DIAGNOSIS — Z72.0 TOBACCO USE: ICD-10-CM

## 2024-07-22 RX ORDER — CYCLOBENZAPRINE HCL 10 MG
10 TABLET ORAL 3 TIMES DAILY PRN
Qty: 45 TABLET | Refills: 0 | Status: SHIPPED | OUTPATIENT
Start: 2024-07-22 | End: 2024-08-06

## 2024-08-29 DIAGNOSIS — Z72.0 TOBACCO USE: ICD-10-CM

## 2024-08-29 DIAGNOSIS — M62.830 BACK MUSCLE SPASM: ICD-10-CM

## 2024-08-30 RX ORDER — CYCLOBENZAPRINE HCL 10 MG
10 TABLET ORAL 3 TIMES DAILY PRN
Qty: 45 TABLET | Refills: 0 | Status: SHIPPED | OUTPATIENT
Start: 2024-08-30 | End: 2024-09-14

## 2024-10-11 DIAGNOSIS — M62.830 BACK MUSCLE SPASM: ICD-10-CM

## 2024-10-11 DIAGNOSIS — Z72.0 TOBACCO USE: ICD-10-CM

## 2024-10-11 RX ORDER — CYCLOBENZAPRINE HCL 10 MG
10 TABLET ORAL 3 TIMES DAILY PRN
Qty: 45 TABLET | Refills: 0 | Status: SHIPPED | OUTPATIENT
Start: 2024-10-11 | End: 2024-10-26

## 2024-11-11 ENCOUNTER — OFFICE VISIT (OUTPATIENT)
Dept: FAMILY MEDICINE CLINIC | Facility: CLINIC | Age: 59
End: 2024-11-11
Payer: COMMERCIAL

## 2024-11-11 VITALS
SYSTOLIC BLOOD PRESSURE: 130 MMHG | WEIGHT: 175 LBS | TEMPERATURE: 98.1 F | OXYGEN SATURATION: 100 % | DIASTOLIC BLOOD PRESSURE: 88 MMHG | BODY MASS INDEX: 31.01 KG/M2 | HEIGHT: 63 IN | HEART RATE: 102 BPM

## 2024-11-11 DIAGNOSIS — M25.50 ARTHRALGIA, UNSPECIFIED JOINT: ICD-10-CM

## 2024-11-11 DIAGNOSIS — Z87.442 HISTORY OF KIDNEY STONES: ICD-10-CM

## 2024-11-11 DIAGNOSIS — R53.82 CHRONIC FATIGUE: ICD-10-CM

## 2024-11-11 DIAGNOSIS — Z13.220 SCREENING FOR LIPID DISORDERS: ICD-10-CM

## 2024-11-11 DIAGNOSIS — Z13.1 SCREENING FOR DIABETES MELLITUS: ICD-10-CM

## 2024-11-11 DIAGNOSIS — Z00.00 ANNUAL PHYSICAL EXAM: Primary | ICD-10-CM

## 2024-11-11 PROBLEM — R31.9 HEMATURIA: Status: RESOLVED | Noted: 2023-08-30 | Resolved: 2024-11-11

## 2024-11-11 PROBLEM — M54.6 ACUTE MIDLINE THORACIC BACK PAIN: Status: RESOLVED | Noted: 2023-12-04 | Resolved: 2024-11-11

## 2024-11-11 PROBLEM — M62.830 BACK MUSCLE SPASM: Status: RESOLVED | Noted: 2022-10-28 | Resolved: 2024-11-11

## 2024-11-11 PROBLEM — Z12.2 SCREENING FOR LUNG CANCER: Status: RESOLVED | Noted: 2022-10-28 | Resolved: 2024-11-11

## 2024-11-11 PROBLEM — B37.9 YEAST INFECTION: Status: RESOLVED | Noted: 2023-08-30 | Resolved: 2024-11-11

## 2024-11-11 PROCEDURE — 99396 PREV VISIT EST AGE 40-64: CPT | Performed by: NURSE PRACTITIONER

## 2024-11-11 NOTE — PROGRESS NOTES
Adult Annual Physical  Name: Domenica Grider      : 1965      MRN: [id number]  Encounter Provider: SULEIMAN Lozada  Encounter Date: 2024   Encounter department: [facility]    Assessment & Plan  Annual physical exam         BMI 31.0-31.9,adult         History of kidney stones    Orders:    UA (URINE) with reflex to Scope; Future    Screening for lipid disorders    Orders:    Lipid panel; Future    Screening for diabetes mellitus    Orders:    Comprehensive metabolic panel; Future    Hemoglobin A1C; Future    Chronic fatigue    Orders:    Comprehensive metabolic panel; Future    CBC and differential; Future    TSH, 3rd generation with Free T4 reflex; Future    Arthralgia, unspecified joint    Orders:    RF Screen w/ Reflex to Titer; Future    Cyclic citrul peptide antibody, IgG; Future    Sjogren's Antibodies; Future    Uric acid; Future    Lyme Total AB W Reflex to IGM/IGG; Future    C-reactive protein; Future    Sedimentation rate, automated; Future  will update her labs to update and evaluate for any autoimmune disorders   Immunizations and preventive care screenings were discussed with patient today. Appropriate education was printed on patient's after visit summary.    Counseling:  Injury prevention: discussed safety/seat belts, safety helmets, smoke detectors, carbon monoxide detectors, and smoking near bedding or upholstery.  Exercise: the importance of regular exercise/physical activity was discussed. Recommend exercise 3-5 times per week for at least 30 minutes.          History of Present Illness     Adult Annual Physical:  Patient presents for annual physical. Patient here today for her annual check up and reports that she is having joint pains and reports that she has an orthopedics scheduled with Dr. Cooper and has for her shoulder pain and is having joint pains and having pains in both shoulders having pain and knees when bending to pick things up. Patient  "reports that she also gained weight since her last visit. Patient reports that she had a mammogram and US and was found to have a cyst and follow up in 6 months .     Diet and Physical Activity:  - Diet/Nutrition: well balanced diet.  - Exercise: walking and 5-7 times a week on average.    Depression Screening:  - PHQ-2 Score: 0    General Health:  - Sleep: sleeps well.  - Hearing: normal hearing bilateral ears.  - Vision: goes for regular eye exams, most recent eye exam < 1 year ago and wears glasses.  - Dental: regular dental visits and brushes teeth twice daily.    Review of Systems   Constitutional:  Negative for activity change, appetite change, chills, diaphoresis, fatigue, fever and unexpected weight change.   HENT:  Negative for congestion, ear pain, hearing loss, postnasal drip, sinus pressure, sinus pain, sneezing and sore throat.    Eyes:  Negative for pain, redness and visual disturbance.   Respiratory:  Negative for cough and shortness of breath.    Cardiovascular:  Negative for chest pain and leg swelling.   Gastrointestinal:  Negative for abdominal pain, diarrhea, nausea and vomiting.   Endocrine: Negative.    Genitourinary: Negative.    Musculoskeletal:  Positive for arthralgias and myalgias.   Skin: Negative.    Allergic/Immunologic: Negative.    Neurological:  Negative for dizziness and light-headedness.   Hematological: Negative.    Psychiatric/Behavioral:  Negative for behavioral problems and dysphoric mood.        Objective     /88 (BP Location: Left arm, Patient Position: Sitting, Cuff Size: Large)   Pulse 102   Temp 98.1 °F (36.7 °C)   Ht 5' 3\" (1.6 m)   Wt 79.4 kg (175 lb)   SpO2 100%   BMI 31.00 kg/m²     Physical Exam  Constitutional:       General: She is not in acute distress.     Appearance: She is well-developed.   HENT:      Head: Normocephalic and atraumatic.      Right Ear: Tympanic membrane and ear canal normal.      Left Ear: Tympanic membrane and ear canal normal.      " Nose: Nose normal.      Mouth/Throat:      Mouth: Mucous membranes are moist.   Eyes:      Pupils: Pupils are equal, round, and reactive to light.   Neck:      Thyroid: No thyromegaly.   Cardiovascular:      Rate and Rhythm: Normal rate and regular rhythm.      Heart sounds: Normal heart sounds. No murmur heard.  Pulmonary:      Effort: Pulmonary effort is normal. No respiratory distress.      Breath sounds: Normal breath sounds. No wheezing.   Abdominal:      General: Bowel sounds are normal.      Palpations: Abdomen is soft.   Musculoskeletal:         General: Normal range of motion.      Cervical back: Normal range of motion.   Skin:     General: Skin is warm and dry.   Neurological:      General: No focal deficit present.      Mental Status: She is alert and oriented to person, place, and time.   Psychiatric:         Mood and Affect: Mood normal.

## 2024-11-11 NOTE — PATIENT INSTRUCTIONS
"Patient Education     Routine physical for adults   The Basics   Written by the doctors and editors at [facility]   What is a physical? -- A physical is a routine visit, or \"check-up,\" with your doctor. You might also hear it called a \"wellness visit\" or \"preventive visit.\"  During each visit, the doctor will:   Ask about your physical and mental health   Ask about your habits, behaviors, and lifestyle   Do an exam   Give you vaccines if needed   Talk to you about any medicines you take   Give advice about your health   Answer your questions  Getting regular check-ups is an important part of taking care of your health. It can help your doctor find and treat any problems you have. But it's also important for preventing health problems.  A routine physical is different from a \"sick visit.\" A sick visit is when you see a doctor because of a health concern or problem. Since physicals are scheduled ahead of time, you can think about what you want to ask the doctor.  How often should I get a physical? -- It depends on your age and health. In general, for people age 21 years and older:   If you are younger than 50 years, you might be able to get a physical every 3 years.   If you are 50 years or older, your doctor might recommend a physical every year.  If you have an ongoing health condition, like diabetes or high blood pressure, your doctor will probably want to see you more often.  What happens during a physical? -- In general, each visit will include:   Physical exam - The doctor or nurse will check your height, weight, heart rate, and blood pressure. They will also look at your eyes and ears. They will ask about how you are feeling and whether you have any symptoms that bother you.   Medicines - It's a good idea to bring a list of all the medicines you take to each doctor visit. Your doctor will talk to you about your medicines and answer any questions. Tell them if you are having any side effects that bother you. You " "should also tell them if you are having trouble paying for any of your medicines.   Habits and behaviors - This includes:   Your diet   Your exercise habits   Whether you smoke, drink alcohol, or use drugs   Whether you are sexually active   Whether you feel safe at home  Your doctor will talk to you about things you can do to improve your health and lower your risk of health problems. They will also offer help and support. For example, if you want to quit smoking, they can give you advice and might prescribe medicines. If you want to improve your diet or get more physical activity, they can help you with this, too.   Lab tests, if needed - The tests you get will depend on your age and situation. For example, your doctor might want to check your:   Cholesterol   Blood sugar   Iron level   Vaccines - The recommended vaccines will depend on your age, health, and what vaccines you already had. Vaccines are very important because they can prevent certain serious or deadly infections.   Discussion of screening - \"Screening\" means checking for diseases or other health problems before they cause symptoms. Your doctor can recommend screening based on your age, risk, and preferences. This might include tests to check for:   Cancer, such as breast, prostate, cervical, ovarian, colorectal, prostate, lung, or skin cancer   Sexually transmitted infections, such as chlamydia and gonorrhea   Mental health conditions like depression and anxiety  Your doctor will talk to you about the different types of screening tests. They can help you decide which screenings to have. They can also explain what the results might mean.   Answering questions - The physical is a good time to ask the doctor or nurse questions about your health. If needed, they can refer you to other doctors or specialists, too.  Adults older than 65 years often need other care, too. As you get older, your doctor will talk to you about:   How to prevent falling at " home   Hearing or vision tests   Memory testing   How to take your medicines safely   Making sure that you have the help and support you need at home  All topics are updated as new evidence becomes available and our peer review process is complete.  This topic retrieved from [employer] on: May 02, 2024.  Topic 248067 Version 1.0  Release: 32.4.3 - C32.122  © 2024 UpToDate, Inc. and/or its affiliates. All rights reserved.  Consumer Information Use and Disclaimer   Disclaimer: This generalized information is a limited summary of diagnosis, treatment, and/or medication information. It is not meant to be comprehensive and should be used as a tool to help the user understand and/or assess potential diagnostic and treatment options. It does NOT include all information about conditions, treatments, medications, side effects, or risks that may apply to a specific patient. It is not intended to be medical advice or a substitute for the medical advice, diagnosis, or treatment of a health care provider based on the health care provider's examination and assessment of a patient's specific and unique circumstances. Patients must speak with a health care provider for complete information about their health, medical questions, and treatment options, including any risks or benefits regarding use of medications. This information does not endorse any treatments or medications as safe, effective, or approved for treating a specific patient. UpToDate, Inc. and its affiliates disclaim any warranty or liability relating to this information or the use thereof.The use of this information is governed by the Terms of Use, available at https://www.[employer].com/en/know/clinical-effectiveness-terms. 2024© UpToDate, Inc. and its affiliates and/or licensors. All rights reserved.  Copyright   © 2024 UpToDate, Inc. and/or its affiliates. All rights reserved.

## 2024-11-11 NOTE — ASSESSMENT & PLAN NOTE
Orders:    RF Screen w/ Reflex to Titer; Future    Cyclic citrul peptide antibody, IgG; Future    Sjogren's Antibodies; Future    Uric acid; Future    Lyme Total AB W Reflex to IGM/IGG; Future    C-reactive protein; Future    Sedimentation rate, automated; Future  will update her labs to update and evaluate for any autoimmune disorders

## 2024-11-12 ENCOUNTER — APPOINTMENT (OUTPATIENT)
Dept: LAB | Facility: CLINIC | Age: 59
End: 2024-11-12
Payer: COMMERCIAL

## 2024-11-12 ENCOUNTER — OFFICE VISIT (OUTPATIENT)
Dept: OBGYN CLINIC | Facility: CLINIC | Age: 59
End: 2024-11-12
Payer: COMMERCIAL

## 2024-11-12 ENCOUNTER — APPOINTMENT (OUTPATIENT)
Dept: RADIOLOGY | Facility: CLINIC | Age: 59
End: 2024-11-12
Payer: COMMERCIAL

## 2024-11-12 VITALS
OXYGEN SATURATION: 99 % | HEIGHT: 63 IN | WEIGHT: 175 LBS | DIASTOLIC BLOOD PRESSURE: 86 MMHG | BODY MASS INDEX: 31.01 KG/M2 | SYSTOLIC BLOOD PRESSURE: 161 MMHG | HEART RATE: 81 BPM

## 2024-11-12 DIAGNOSIS — Z87.442 HISTORY OF KIDNEY STONES: ICD-10-CM

## 2024-11-12 DIAGNOSIS — M25.512 CHRONIC PAIN OF BOTH SHOULDERS: ICD-10-CM

## 2024-11-12 DIAGNOSIS — M19.011 ARTHRITIS OF BOTH ACROMIOCLAVICULAR JOINTS: Primary | ICD-10-CM

## 2024-11-12 DIAGNOSIS — M75.32 CALCIFIC TENDINITIS OF BOTH SHOULDERS: ICD-10-CM

## 2024-11-12 DIAGNOSIS — M25.50 ARTHRALGIA, UNSPECIFIED JOINT: ICD-10-CM

## 2024-11-12 DIAGNOSIS — M25.511 CHRONIC PAIN OF BOTH SHOULDERS: ICD-10-CM

## 2024-11-12 DIAGNOSIS — M75.31 CALCIFIC TENDINITIS OF BOTH SHOULDERS: ICD-10-CM

## 2024-11-12 DIAGNOSIS — R53.82 CHRONIC FATIGUE: ICD-10-CM

## 2024-11-12 DIAGNOSIS — Z13.1 SCREENING FOR DIABETES MELLITUS: ICD-10-CM

## 2024-11-12 DIAGNOSIS — G89.29 CHRONIC PAIN OF BOTH SHOULDERS: ICD-10-CM

## 2024-11-12 DIAGNOSIS — Z13.220 SCREENING FOR LIPID DISORDERS: ICD-10-CM

## 2024-11-12 DIAGNOSIS — M19.012 ARTHRITIS OF BOTH ACROMIOCLAVICULAR JOINTS: Primary | ICD-10-CM

## 2024-11-12 LAB
ALBUMIN SERPL BCG-MCNC: 4.2 G/DL (ref 3.5–5)
ALP SERPL-CCNC: 82 U/L (ref 34–104)
ALT SERPL W P-5'-P-CCNC: 23 U/L (ref 7–52)
ANION GAP SERPL CALCULATED.3IONS-SCNC: 7 MMOL/L (ref 4–13)
AST SERPL W P-5'-P-CCNC: 22 U/L (ref 13–39)
B BURGDOR IGG+IGM SER QL IA: NEGATIVE
BASOPHILS # BLD AUTO: 0.07 THOUSANDS/ÂΜL (ref 0–0.1)
BASOPHILS NFR BLD AUTO: 1 % (ref 0–1)
BILIRUB SERPL-MCNC: 0.38 MG/DL (ref 0.2–1)
BILIRUB UR QL STRIP: NEGATIVE
BUN SERPL-MCNC: 12 MG/DL (ref 5–25)
CALCIUM SERPL-MCNC: 9.3 MG/DL (ref 8.4–10.2)
CHLORIDE SERPL-SCNC: 103 MMOL/L (ref 96–108)
CHOLEST SERPL-MCNC: 190 MG/DL
CLARITY UR: CLEAR
CO2 SERPL-SCNC: 30 MMOL/L (ref 21–32)
COLOR UR: NORMAL
CREAT SERPL-MCNC: 0.78 MG/DL (ref 0.6–1.3)
CRP SERPL QL: 7.4 MG/L
EOSINOPHIL # BLD AUTO: 0.24 THOUSAND/ÂΜL (ref 0–0.61)
EOSINOPHIL NFR BLD AUTO: 4 % (ref 0–6)
ERYTHROCYTE [DISTWIDTH] IN BLOOD BY AUTOMATED COUNT: 14.4 % (ref 11.6–15.1)
ERYTHROCYTE [SEDIMENTATION RATE] IN BLOOD: 52 MM/HOUR (ref 0–29)
EST. AVERAGE GLUCOSE BLD GHB EST-MCNC: 128 MG/DL
GFR SERPL CREATININE-BSD FRML MDRD: 82 ML/MIN/1.73SQ M
GLUCOSE P FAST SERPL-MCNC: 100 MG/DL (ref 65–99)
GLUCOSE UR STRIP-MCNC: NEGATIVE MG/DL
HBA1C MFR BLD: 6.1 %
HCT VFR BLD AUTO: 41.5 % (ref 34.8–46.1)
HDLC SERPL-MCNC: 57 MG/DL
HGB BLD-MCNC: 13.1 G/DL (ref 11.5–15.4)
HGB UR QL STRIP.AUTO: NEGATIVE
IMM GRANULOCYTES # BLD AUTO: 0.02 THOUSAND/UL (ref 0–0.2)
IMM GRANULOCYTES NFR BLD AUTO: 0 % (ref 0–2)
KETONES UR STRIP-MCNC: NEGATIVE MG/DL
LDLC SERPL CALC-MCNC: 103 MG/DL (ref 0–100)
LEUKOCYTE ESTERASE UR QL STRIP: NEGATIVE
LYMPHOCYTES # BLD AUTO: 1.47 THOUSANDS/ÂΜL (ref 0.6–4.47)
LYMPHOCYTES NFR BLD AUTO: 24 % (ref 14–44)
MCH RBC QN AUTO: 30.5 PG (ref 26.8–34.3)
MCHC RBC AUTO-ENTMCNC: 31.6 G/DL (ref 31.4–37.4)
MCV RBC AUTO: 97 FL (ref 82–98)
MONOCYTES # BLD AUTO: 0.43 THOUSAND/ÂΜL (ref 0.17–1.22)
MONOCYTES NFR BLD AUTO: 7 % (ref 4–12)
NEUTROPHILS # BLD AUTO: 3.96 THOUSANDS/ÂΜL (ref 1.85–7.62)
NEUTS SEG NFR BLD AUTO: 64 % (ref 43–75)
NITRITE UR QL STRIP: NEGATIVE
NONHDLC SERPL-MCNC: 133 MG/DL
NRBC BLD AUTO-RTO: 0 /100 WBCS
PH UR STRIP.AUTO: 7 [PH]
PLATELET # BLD AUTO: 271 THOUSANDS/UL (ref 149–390)
PMV BLD AUTO: 11.4 FL (ref 8.9–12.7)
POTASSIUM SERPL-SCNC: 4.4 MMOL/L (ref 3.5–5.3)
PROT SERPL-MCNC: 7.8 G/DL (ref 6.4–8.4)
PROT UR STRIP-MCNC: NEGATIVE MG/DL
RBC # BLD AUTO: 4.3 MILLION/UL (ref 3.81–5.12)
SODIUM SERPL-SCNC: 140 MMOL/L (ref 135–147)
SP GR UR STRIP.AUTO: 1.01 (ref 1–1.03)
TRIGL SERPL-MCNC: 148 MG/DL
TSH SERPL DL<=0.05 MIU/L-ACNC: 1.31 UIU/ML (ref 0.45–4.5)
URATE SERPL-MCNC: 4.9 MG/DL (ref 2–7.5)
UROBILINOGEN UR STRIP-ACNC: <2 MG/DL
WBC # BLD AUTO: 6.19 THOUSAND/UL (ref 4.31–10.16)

## 2024-11-12 PROCEDURE — 81003 URINALYSIS AUTO W/O SCOPE: CPT

## 2024-11-12 PROCEDURE — 86431 RHEUMATOID FACTOR QUANT: CPT

## 2024-11-12 PROCEDURE — 83036 HEMOGLOBIN GLYCOSYLATED A1C: CPT

## 2024-11-12 PROCEDURE — 84443 ASSAY THYROID STIM HORMONE: CPT

## 2024-11-12 PROCEDURE — 80053 COMPREHEN METABOLIC PANEL: CPT

## 2024-11-12 PROCEDURE — 85025 COMPLETE CBC W/AUTO DIFF WBC: CPT

## 2024-11-12 PROCEDURE — 85652 RBC SED RATE AUTOMATED: CPT

## 2024-11-12 PROCEDURE — 86200 CCP ANTIBODY: CPT

## 2024-11-12 PROCEDURE — 86618 LYME DISEASE ANTIBODY: CPT

## 2024-11-12 PROCEDURE — 73030 X-RAY EXAM OF SHOULDER: CPT

## 2024-11-12 PROCEDURE — 86235 NUCLEAR ANTIGEN ANTIBODY: CPT

## 2024-11-12 PROCEDURE — 36415 COLL VENOUS BLD VENIPUNCTURE: CPT

## 2024-11-12 PROCEDURE — 86430 RHEUMATOID FACTOR TEST QUAL: CPT

## 2024-11-12 PROCEDURE — 80061 LIPID PANEL: CPT

## 2024-11-12 PROCEDURE — 84550 ASSAY OF BLOOD/URIC ACID: CPT

## 2024-11-12 PROCEDURE — 20610 DRAIN/INJ JOINT/BURSA W/O US: CPT | Performed by: FAMILY MEDICINE

## 2024-11-12 PROCEDURE — 86140 C-REACTIVE PROTEIN: CPT

## 2024-11-12 PROCEDURE — 99204 OFFICE O/P NEW MOD 45 MIN: CPT | Performed by: FAMILY MEDICINE

## 2024-11-12 RX ORDER — TRIAMCINOLONE ACETONIDE 40 MG/ML
40 INJECTION, SUSPENSION INTRA-ARTICULAR; INTRAMUSCULAR
Status: COMPLETED | OUTPATIENT
Start: 2024-11-12 | End: 2024-11-12

## 2024-11-12 RX ORDER — BUPIVACAINE HYDROCHLORIDE 2.5 MG/ML
1 INJECTION, SOLUTION INFILTRATION; PERINEURAL
Status: COMPLETED | OUTPATIENT
Start: 2024-11-12 | End: 2024-11-12

## 2024-11-12 RX ORDER — BUPIVACAINE HYDROCHLORIDE 2.5 MG/ML
4 INJECTION, SOLUTION INFILTRATION; PERINEURAL
Status: COMPLETED | OUTPATIENT
Start: 2024-11-12 | End: 2024-11-12

## 2024-11-12 RX ADMIN — BUPIVACAINE HYDROCHLORIDE 1 ML: 2.5 INJECTION, SOLUTION INFILTRATION; PERINEURAL at 08:00

## 2024-11-12 RX ADMIN — BUPIVACAINE HYDROCHLORIDE 4 ML: 2.5 INJECTION, SOLUTION INFILTRATION; PERINEURAL at 08:00

## 2024-11-12 RX ADMIN — TRIAMCINOLONE ACETONIDE 40 MG: 40 INJECTION, SUSPENSION INTRA-ARTICULAR; INTRAMUSCULAR at 08:00

## 2024-11-12 NOTE — PROGRESS NOTES
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  Discussed with patient physical exam, imaging studies, impression, and plan.  X-rays both shoulders noted for AC joint degenerative changes and calcific tendinitis, more pronounced on the left.  Imaging studies, clinical exam, and history suggest that she has symptoms from combination of aggravated AC arthritis and calcific tendinitis.  I discussed treatment regimen steroid injection and topical anti-inflammatory.  Invasive management such as surgery is not warranted.  She elected proceed with injection only of the left shoulder.  I administered mixture of 3 cc 0.25% bupivacaine and 1 cc Kenalog to the left shoulder subacromial space without complication.  She is to apply topical diclofenac gel 3 times daily for the next 10 days, afterward apply as needed.  She will follow-up as needed.            Subjective:   Patient ID: Domenica Grider is a 59 y.o. female.  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  She denies trauma or inciting event.  Pain described as gradual in onset, generalized to the shoulder, radiating distally to the upper arm, achy and burning, worse at abducting the arm, associated with limited range of motion, and improved with resting.  In the past she would manage symptoms with icing and  "sometimes taking over-the-counter medications.  She reports worsening symptoms over the past few months with increasing her level of activity at work.  She states that symptoms are worsening with holding her arms in abduction/elevated while here dressing.    Shoulder Pain  This is a chronic problem. The current episode started more than 1 year ago. The problem occurs daily. The problem has been gradually worsening. Associated symptoms include arthralgias. Pertinent negatives include no joint swelling, numbness or weakness. Exacerbated by: Arm elevation. She has tried rest, position changes, ice and acetaminophen for the symptoms. The treatment provided mild relief.           The following portions of the patient's history were reviewed and updated as appropriate: She  has a past medical history of Choledocholithiasis (09/07/2017), Colon polyp, and GERD (gastroesophageal reflux disease).  She is allergic to bactrim [sulfamethoxazole-trimethoprim], ciprofloxacin, erythromycin, erythromycin base, gluten meal - food allergy, ibuprofen, lactose - food allergy, levaquin [levofloxacin], penicillins, tetracycline, and valtrex [valacyclovir]..    Review of Systems   Musculoskeletal:  Positive for arthralgias. Negative for joint swelling.   Neurological:  Negative for weakness and numbness.       Objective:  Vitals:    11/12/24 0756   BP: 161/86   Pulse: 81   SpO2: 99%   Weight: 79.4 kg (175 lb)   Height: 5' 3\" (1.6 m)      Right Hand Exam     Muscle Strength   Wrist extension: 5/5   Wrist flexion: 5/5   : 5/5     Other   Pulse: present      Left Hand Exam     Muscle Strength   Wrist extension: 5/5   Wrist flexion: 5/5   :  5/5     Other   Pulse: present      Right Shoulder Exam     Tenderness   Right shoulder tenderness location: Upper trapezius.    Range of Motion   Active abduction:  150   Forward flexion:  160   Internal rotation 0 degrees:  Lumbar     Muscle Strength   Abduction: 5/5   Internal rotation: 5/5 "   External rotation: 5/5   Supraspinatus: 5/5     Tests   Fermin test: negative    Comments:  Negative empty can      Left Shoulder Exam     Tenderness   Left shoulder tenderness location: Anterior, lateral, upper trapezius.    Range of Motion   Active abduction:  160   Forward flexion:  170   Internal rotation 0 degrees:  Lumbar     Muscle Strength   Abduction: 5/5   Internal rotation: 5/5   External rotation: 5/5   Supraspinatus: 5/5     Tests   Fermin test: positive     Comments:  Negative empty can          Strength/Myotome Testing     Left Wrist/Hand   Wrist extension: 5  Wrist flexion: 5    Right Wrist/Hand   Wrist extension: 5  Wrist flexion: 5      Physical Exam  Vitals and nursing note reviewed.   Constitutional:       Appearance: Normal appearance. She is well-developed. She is not ill-appearing or diaphoretic.   HENT:      Head: Normocephalic and atraumatic.      Right Ear: External ear normal.      Left Ear: External ear normal.   Eyes:      Conjunctiva/sclera: Conjunctivae normal.   Neck:      Trachea: No tracheal deviation.   Cardiovascular:      Rate and Rhythm: Normal rate.   Pulmonary:      Effort: Pulmonary effort is normal. No respiratory distress.   Abdominal:      General: There is no distension.   Musculoskeletal:         General: Tenderness present.   Skin:     General: Skin is warm and dry.      Coloration: Skin is not jaundiced or pale.   Neurological:      Mental Status: She is alert and oriented to person, place, and time.   Psychiatric:         Mood and Affect: Mood normal.         Behavior: Behavior normal.         Thought Content: Thought content normal.         Judgment: Judgment normal.         I have personally reviewed pertinent films in PACS and my interpretation is  .  X-rays both shoulders noted for AC joint degenerative changes and calcific tendinitis, larger calcific body on the left.    Large joint arthrocentesis: L subacromial bursa  Ada Protocol:  procedure performed  "by consultantConsent: Verbal consent obtained.  Risks and benefits: risks, benefits and alternatives were discussed  Consent given by: patient  Time out: Immediately prior to procedure a \"time out\" was called to verify the correct patient, procedure, equipment, support staff and site/side marked as required.  Patient understanding: patient states understanding of the procedure being performed  Patient consent: the patient's understanding of the procedure matches consent given  Procedure consent: procedure consent matches procedure scheduled  Relevant documents: relevant documents present and verified  Test results: test results available and properly labeled  Site marked: the operative site was marked  Radiology Images displayed and confirmed. If images not available, report reviewed: imaging studies available  Required items: required blood products, implants, devices, and special equipment available  Patient identity confirmed: verbally with patient  Supporting Documentation  Indications: pain   Procedure Details  Location: shoulder - L subacromial bursa  Preparation: Patient was prepped and draped in the usual sterile fashion  Needle gauge: 21G 2\"  Ultrasound guidance: no  Approach: lateral  Medications administered: 4 mL bupivacaine 0.25 %; 1 mL bupivacaine 0.25 %; 40 mg triamcinolone acetonide 40 mg/mL    Patient tolerance: patient tolerated the procedure well with no immediate complications  Dressing:  Sterile dressing applied              "

## 2024-11-13 ENCOUNTER — RESULTS FOLLOW-UP (OUTPATIENT)
Dept: FAMILY MEDICINE CLINIC | Facility: CLINIC | Age: 59
End: 2024-11-13

## 2024-11-13 DIAGNOSIS — M05.80 POLYARTHRITIS WITH POSITIVE RHEUMATOID FACTOR (HCC): ICD-10-CM

## 2024-11-13 DIAGNOSIS — M25.50 ARTHRALGIA, UNSPECIFIED JOINT: Primary | ICD-10-CM

## 2024-11-13 LAB
CRYOGLOB RF SER-ACNC: ABNORMAL [IU]/ML
ENA SS-A AB SER-ACNC: <0.2 AI (ref 0–0.9)
ENA SS-B AB SER-ACNC: <0.2 AI (ref 0–0.9)
RHEUMATOID FACT SER QL LA: POSITIVE

## 2024-11-15 DIAGNOSIS — Z72.0 TOBACCO USE: ICD-10-CM

## 2024-11-15 LAB — CCP AB SER IA-ACNC: 1

## 2024-11-23 DIAGNOSIS — M62.830 BACK MUSCLE SPASM: ICD-10-CM

## 2024-11-25 ENCOUNTER — CONSULT (OUTPATIENT)
Age: 59
End: 2024-11-25
Payer: COMMERCIAL

## 2024-11-25 VITALS
BODY MASS INDEX: 29.77 KG/M2 | DIASTOLIC BLOOD PRESSURE: 88 MMHG | OXYGEN SATURATION: 98 % | WEIGHT: 168 LBS | HEART RATE: 110 BPM | HEIGHT: 63 IN | SYSTOLIC BLOOD PRESSURE: 142 MMHG

## 2024-11-25 DIAGNOSIS — M05.80 POLYARTHRITIS WITH POSITIVE RHEUMATOID FACTOR (HCC): ICD-10-CM

## 2024-11-25 DIAGNOSIS — M81.0 AGE-RELATED OSTEOPOROSIS WITHOUT CURRENT PATHOLOGICAL FRACTURE: Primary | ICD-10-CM

## 2024-11-25 DIAGNOSIS — M25.50 ARTHRALGIA, UNSPECIFIED JOINT: ICD-10-CM

## 2024-11-25 PROCEDURE — 99204 OFFICE O/P NEW MOD 45 MIN: CPT | Performed by: INTERNAL MEDICINE

## 2024-11-25 RX ORDER — CYCLOBENZAPRINE HCL 10 MG
10 TABLET ORAL 3 TIMES DAILY PRN
Qty: 45 TABLET | Refills: 0 | Status: SHIPPED | OUTPATIENT
Start: 2024-11-25 | End: 2024-12-10

## 2024-11-25 NOTE — PATIENT INSTRUCTIONS
Please do DEXA scan as ordered  Please do blood work as ordered before next visit  Please call Central scheduling: [phone] to schedule your DEXA scan

## 2024-11-26 NOTE — PROGRESS NOTES
Name: Domenica Grider      : 1965      MRN: [id number]  Encounter Provider: Dann Egan MD  Encounter Date: 2024   Encounter department: [facility] [address]  :  Assessment & Plan  Age-related osteoporosis without current pathological fracture  She has Osteoporosis based on T-score of -3.2 in her lumbar spine on DEXA scan in 2020. She was never treated for Osteoporosis. She was told to take calcium and vitamin D supplements. Her DEXA scan was checked in the 2020 for metatarsal stress fracture. I discussed the treatment options with her. She is hesitant to start any treatment. She will be willing to start the treatment after a repeat DEXA scan which she is overdue for now.     Orders:    DXA bone density spine hip and pelvis; Future    Polyarthritis with positive rheumatoid factor (HCC)  Domenica Grider is a 59 y.o. female who presents as a referral for possible rheumatoid arthritis. She was recently diagnosed with bilateral calcific tendinitis s/p recent corticosteroid injection by orthopedics, after she had shoulder pain for 2 weeks. She also has mild bilateral knee pain for the last 2 weeks with no prolonged morning stiffness or swelling. She has positive RF of 1024 and also had positive RF of 80 in 2020. She does not report any joint pains for the last 4 years duration. She has negative CCP    With no inflammatory arthritis presentation and chronically positive RF and relatively asymptomatic for the most of the duration, the concern for rheumatoid arthritis is low, but will continue to monitor her symptoms in the future visits to see if she develops any symptoms or signs of rheumatoid arthritis. Having said that, will complete the work-up of positive RF, which can be seen with conditions other than rheumatoid arthritis like chronic infections such as hepatitis and cryoglobulinemia. She has negative Sjogren's abs    Also with her acute shoulder pain and elevated inflammatory  markers CRP 7.4 ESR 52 and age > 50, there is a concern that she might also be having Polymyalgia rheumatica (PMR) on top of calcific tendinitis. But to definitively diagnose, we recommended to do a trial of steroids which patient is not in agreement with because of risks of side-effects. She wants to monitor her symptoms for now and will update me if there is any worsening of her symptoms.     Orders:    Ambulatory Referral to Rheumatology    Chronic Hepatitis Panel; Future    Sedimentation rate, automated; Future    C-reactive protein; Future    C4 complement; Future    C3 complement; Future    Cryoglobulin; Future    Arthralgia, unspecified joint    Orders:    Ambulatory Referral to Rheumatology        History of Present Illness     HPI  Domenica Grider is a 59 y.o. female who presents as a referral for possible rheumatoid arthritis. She was recently diagnosed with bilateral calcific tendinitis s/p recent corticosteroid injection by orthopedics, after she had shoulder pain for 2 weeks. She also has mild bilateral knee pain for the last 2 weeks with no prolonged morning stiffness or swelling. She has positive RF of 1024 and also had positive RF of 80 in . She does not report any joint pains for the last 4 years duration. She has negative CCP    History obtained from: patient    Review of Systems  Rest of ROS are negative except as noted in HPI    Past Medical History   Past Medical History:   Diagnosis Date    Choledocholithiasis 2017    Colon polyp     GERD (gastroesophageal reflux disease)      Past Surgical History:   Procedure Laterality Date     SECTION      CHOLECYSTECTOMY      CHOLECYSTECTOMY LAPAROSCOPIC N/A 2017    Procedure: CHOLECYSTECTOMY LAPAROSCOPIC, with IOC, possible open;  Surgeon: Nghia Roberts MD;  Location: [facility];  Service: General    DENTAL SURGERY      ERCP N/A 2017    Procedure: ENDOSCOPIC RETROGRADE CHOLANGIOPANCREATOGRAPHY (ERCP);  Surgeon: Femi Millan  III, MD;  Location: [facility];  Service: Gastroenterology     Family History   Problem Relation Age of Onset    Hodgkin's lymphoma Mother     Cancer Father     Hypertension Father     Diabetes Father         of other type wth microalbuminuria, with long-term current use of insulin      reports that she has quit smoking. Her smoking use included cigarettes. She started smoking about 4 months ago. She has a 0.1 pack-year smoking history. She has never used smokeless tobacco. She reports that she does not currently use alcohol after a past usage of about 1.0 standard drink of alcohol per week. She reports that she does not use drugs.  Current Outpatient Medications on File Prior to Visit   Medication Sig Dispense Refill    nicotine polacrilex (Nicorette) 4 mg gum Chew 1 each (4 mg total) in the morning 100 each 0    cyclobenzaprine (FLEXERIL) 10 mg tablet Take 1 tablet (10 mg total) by mouth 3 (three) times a day as needed for muscle spasms for up to 15 days 45 tablet 0    Diclofenac Sodium (VOLTAREN) 1 % Apply 2 g topically 3 (three) times a day (Patient not taking: Reported on 11/25/2024) 150 g 2     No current facility-administered medications on file prior to visit.     Allergies   Allergen Reactions    Bactrim [Sulfamethoxazole-Trimethoprim] GI Intolerance    Ciprofloxacin GI Intolerance     Other reaction(s): CIPROFLOXACIN (CIPRO)    Erythromycin GI Intolerance    Erythromycin Base      Other reaction(s): ETHYL ALCOHOL (ERYTHROMYCIN) (SOB)    Gluten Meal - Food Allergy     Ibuprofen GI Bleeding    Lactose - Food Allergy     Levaquin [Levofloxacin] GI Intolerance    Penicillins     Tetracycline     Valtrex [Valacyclovir] Other (See Comments)     neuropathy      Medical History Reviewed by provider this encounter:     .  Past Medical History   Past Medical History:   Diagnosis Date    Choledocholithiasis 09/07/2017    Colon polyp     GERD (gastroesophageal reflux disease)      Past Surgical History:   Procedure  Laterality Date     SECTION      CHOLECYSTECTOMY      CHOLECYSTECTOMY LAPAROSCOPIC N/A 2017    Procedure: CHOLECYSTECTOMY LAPAROSCOPIC, with IOC, possible open;  Surgeon: Nghia Roberts MD;  Location: [facility];  Service: General    DENTAL SURGERY      ERCP N/A 2017    Procedure: ENDOSCOPIC RETROGRADE CHOLANGIOPANCREATOGRAPHY (ERCP);  Surgeon: Femi Millan III, MD;  Location: [facility];  Service: Gastroenterology     Family History   Problem Relation Age of Onset    Hodgkin's lymphoma Mother     Cancer Father     Hypertension Father     Diabetes Father         of other type wth microalbuminuria, with long-term current use of insulin      reports that she has quit smoking. Her smoking use included cigarettes. She started smoking about 4 months ago. She has a 0.1 pack-year smoking history. She has never used smokeless tobacco. She reports that she does not currently use alcohol after a past usage of about 1.0 standard drink of alcohol per week. She reports that she does not use drugs.  Current Outpatient Medications on File Prior to Visit   Medication Sig Dispense Refill    nicotine polacrilex (Nicorette) 4 mg gum Chew 1 each (4 mg total) in the morning 100 each 0    cyclobenzaprine (FLEXERIL) 10 mg tablet Take 1 tablet (10 mg total) by mouth 3 (three) times a day as needed for muscle spasms for up to 15 days 45 tablet 0    Diclofenac Sodium (VOLTAREN) 1 % Apply 2 g topically 3 (three) times a day (Patient not taking: Reported on 2024) 150 g 2     No current facility-administered medications on file prior to visit.     Allergies   Allergen Reactions    Bactrim [Sulfamethoxazole-Trimethoprim] GI Intolerance    Ciprofloxacin GI Intolerance     Other reaction(s): CIPROFLOXACIN (CIPRO)    Erythromycin GI Intolerance    Erythromycin Base      Other reaction(s): ETHYL ALCOHOL (ERYTHROMYCIN) (SOB)    Gluten Meal - Food Allergy     Ibuprofen GI Bleeding    Lactose - Food Allergy     Levaquin  "[Levofloxacin] GI Intolerance    Penicillins     Tetracycline     Valtrex [Valacyclovir] Other (See Comments)     neuropathy      Current Outpatient Medications on File Prior to Visit   Medication Sig Dispense Refill    nicotine polacrilex (Nicorette) 4 mg gum Chew 1 each (4 mg total) in the morning 100 each 0    cyclobenzaprine (FLEXERIL) 10 mg tablet Take 1 tablet (10 mg total) by mouth 3 (three) times a day as needed for muscle spasms for up to 15 days 45 tablet 0    Diclofenac Sodium (VOLTAREN) 1 % Apply 2 g topically 3 (three) times a day (Patient not taking: Reported on 11/25/2024) 150 g 2     No current facility-administered medications on file prior to visit.      Social History     Tobacco Use    Smoking status: Former     Current packs/day: 0.20     Average packs/day: 0.2 packs/day for 0.4 years (0.1 ttl pk-yrs)     Types: Cigarettes     Start date: 07/2024    Smokeless tobacco: Never   Vaping Use    Vaping status: Never Used   Substance and Sexual Activity    Alcohol use: Not Currently     Alcohol/week: 1.0 standard drink of alcohol     Types: 1 Shots of liquor per week    Drug use: No    Sexual activity: Not on file        Objective   /88   Pulse (!) 110   Ht 5' 3\" (1.6 m)   Wt 76.2 kg (168 lb)   SpO2 98%   BMI 29.76 kg/m²      Physical Exam  Constitutional:       Appearance: Normal appearance.   HENT:      Head: Normocephalic and atraumatic.   Musculoskeletal:      Comments: No swelling, tenderness, erythema or deformities of bilateral wrists. MCPs. PIPs and DIPs  No swelling, tenderness of bilateral shoulders, hips, knees, elbows, ankles and feet  ROM of all the joints is normal      Skin:     Findings: No rash.   Neurological:      Mental Status: She is alert and oriented to person, place, and time.           "

## 2024-12-03 DIAGNOSIS — Z72.0 TOBACCO USE: ICD-10-CM

## 2024-12-11 PROBLEM — Z13.1 SCREENING FOR DIABETES MELLITUS: Status: RESOLVED | Noted: 2024-11-11 | Resolved: 2024-12-11

## 2024-12-11 PROBLEM — Z13.220 SCREENING FOR LIPID DISORDERS: Status: RESOLVED | Noted: 2024-11-11 | Resolved: 2024-12-11

## 2025-01-08 DIAGNOSIS — M62.830 BACK MUSCLE SPASM: ICD-10-CM

## 2025-01-09 RX ORDER — CYCLOBENZAPRINE HCL 10 MG
10 TABLET ORAL 3 TIMES DAILY PRN
Qty: 45 TABLET | Refills: 0 | Status: SHIPPED | OUTPATIENT
Start: 2025-01-09 | End: 2025-01-24

## 2025-02-24 DIAGNOSIS — M62.830 BACK MUSCLE SPASM: ICD-10-CM

## 2025-02-25 RX ORDER — CYCLOBENZAPRINE HCL 10 MG
10 TABLET ORAL 3 TIMES DAILY PRN
Qty: 45 TABLET | Refills: 0 | Status: SHIPPED | OUTPATIENT
Start: 2025-02-25 | End: 2025-03-12

## 2025-03-15 ENCOUNTER — APPOINTMENT (OUTPATIENT)
Dept: LAB | Facility: HOSPITAL | Age: 60
End: 2025-03-15
Payer: COMMERCIAL

## 2025-03-15 DIAGNOSIS — M05.80 POLYARTHRITIS WITH POSITIVE RHEUMATOID FACTOR (HCC): ICD-10-CM

## 2025-03-15 LAB
C3 SERPL-MCNC: 149 MG/DL (ref 87–200)
C4 SERPL-MCNC: 31 MG/DL (ref 19–52)
CRP SERPL QL: 7.1 MG/L
ERYTHROCYTE [SEDIMENTATION RATE] IN BLOOD: 36 MM/HOUR (ref 0–29)

## 2025-03-15 PROCEDURE — 86803 HEPATITIS C AB TEST: CPT

## 2025-03-15 PROCEDURE — 86160 COMPLEMENT ANTIGEN: CPT

## 2025-03-15 PROCEDURE — 36415 COLL VENOUS BLD VENIPUNCTURE: CPT

## 2025-03-15 PROCEDURE — 87340 HEPATITIS B SURFACE AG IA: CPT

## 2025-03-15 PROCEDURE — 85652 RBC SED RATE AUTOMATED: CPT

## 2025-03-15 PROCEDURE — 86140 C-REACTIVE PROTEIN: CPT

## 2025-03-15 PROCEDURE — 86704 HEP B CORE ANTIBODY TOTAL: CPT

## 2025-03-15 PROCEDURE — 82595 ASSAY OF CRYOGLOBULIN: CPT

## 2025-03-15 PROCEDURE — 86705 HEP B CORE ANTIBODY IGM: CPT

## 2025-03-16 LAB
HBV CORE AB SER QL: NORMAL
HBV CORE IGM SER QL: NORMAL
HBV SURFACE AG SER QL: NORMAL
HCV AB SER QL: NORMAL

## 2025-03-20 LAB — CRYOGLOB SER QL 1D COLD INC: NORMAL

## 2025-03-25 ENCOUNTER — HOSPITAL ENCOUNTER (OUTPATIENT)
Dept: RADIOLOGY | Facility: MEDICAL CENTER | Age: 60
Discharge: HOME | End: 2025-03-25
Payer: COMMERCIAL

## 2025-03-25 DIAGNOSIS — M81.0 AGE-RELATED OSTEOPOROSIS WITHOUT CURRENT PATHOLOGICAL FRACTURE: ICD-10-CM

## 2025-03-25 PROCEDURE — 77080 DXA BONE DENSITY AXIAL: CPT

## 2025-03-28 ENCOUNTER — TELEPHONE (OUTPATIENT)
Dept: RHEUMATOLOGY | Facility: CLINIC | Age: 60
End: 2025-03-28

## 2025-03-28 NOTE — TELEPHONE ENCOUNTER
Called patient to discuss about DEXA scan results and treatment initiation with no response. So, left voice message

## 2025-03-31 DIAGNOSIS — Z72.0 TOBACCO USE: ICD-10-CM

## 2025-03-31 DIAGNOSIS — M62.830 BACK MUSCLE SPASM: ICD-10-CM

## 2025-04-02 RX ORDER — CYCLOBENZAPRINE HCL 10 MG
10 TABLET ORAL 3 TIMES DAILY PRN
Qty: 45 TABLET | Refills: 0 | Status: SHIPPED | OUTPATIENT
Start: 2025-04-02 | End: 2025-04-17

## 2025-04-09 ENCOUNTER — TELEPHONE (OUTPATIENT)
Age: 60
End: 2025-04-09

## 2025-05-10 ENCOUNTER — APPOINTMENT (OUTPATIENT)
Dept: LAB | Facility: CLINIC | Age: 60
End: 2025-05-10
Payer: COMMERCIAL

## 2025-05-10 DIAGNOSIS — M81.0 SENILE OSTEOPOROSIS: ICD-10-CM

## 2025-05-10 LAB — 25(OH)D3 SERPL-MCNC: 28.5 NG/ML (ref 30–100)

## 2025-05-10 PROCEDURE — 36415 COLL VENOUS BLD VENIPUNCTURE: CPT

## 2025-05-10 PROCEDURE — 82306 VITAMIN D 25 HYDROXY: CPT

## 2025-05-10 PROCEDURE — 84080 ASSAY ALKALINE PHOSPHATASES: CPT

## 2025-05-12 ENCOUNTER — TELEPHONE (OUTPATIENT)
Dept: LAB | Facility: HOSPITAL | Age: 60
End: 2025-05-12

## 2025-05-12 ENCOUNTER — APPOINTMENT (OUTPATIENT)
Dept: LAB | Facility: HOSPITAL | Age: 60
End: 2025-05-12
Payer: COMMERCIAL

## 2025-05-12 DIAGNOSIS — M81.0 SENILE OSTEOPOROSIS: Primary | ICD-10-CM

## 2025-05-12 PROCEDURE — 82340 ASSAY OF CALCIUM IN URINE: CPT

## 2025-05-13 DIAGNOSIS — M62.830 BACK MUSCLE SPASM: ICD-10-CM

## 2025-05-13 DIAGNOSIS — Z72.0 TOBACCO USE: ICD-10-CM

## 2025-05-13 LAB
CALCIUM 24H UR-MCNC: 212 MG/24 HRS (ref 100–300)
SPECIMEN VOL UR: 1000 ML

## 2025-05-14 LAB — ALP BONE SERPL-MCNC: 14.1 UG/L

## 2025-05-15 RX ORDER — CYCLOBENZAPRINE HCL 10 MG
TABLET ORAL
Qty: 45 TABLET | Refills: 0 | OUTPATIENT
Start: 2025-05-15

## 2025-05-15 RX ORDER — NICOTINE POLACRILEX 4 MG/1
GUM, CHEWING ORAL
OUTPATIENT
Start: 2025-05-15

## 2025-05-15 NOTE — TELEPHONE ENCOUNTER
5/15 ml spoke to pt, she is still in school and requests to schedule online when she has her calendar in front of her

## 2025-05-22 ENCOUNTER — OFFICE VISIT (OUTPATIENT)
Dept: URGENT CARE | Facility: CLINIC | Age: 60
End: 2025-05-22
Payer: COMMERCIAL

## 2025-05-22 ENCOUNTER — APPOINTMENT (OUTPATIENT)
Dept: RADIOLOGY | Facility: CLINIC | Age: 60
End: 2025-05-22
Attending: PHYSICIAN ASSISTANT
Payer: COMMERCIAL

## 2025-05-22 VITALS
DIASTOLIC BLOOD PRESSURE: 84 MMHG | SYSTOLIC BLOOD PRESSURE: 162 MMHG | OXYGEN SATURATION: 97 % | TEMPERATURE: 97.9 F | HEART RATE: 119 BPM | RESPIRATION RATE: 20 BRPM

## 2025-05-22 DIAGNOSIS — M25.512 ACUTE PAIN OF LEFT SHOULDER: ICD-10-CM

## 2025-05-22 DIAGNOSIS — M75.32 CALCIFIC TENDONITIS OF LEFT SHOULDER: ICD-10-CM

## 2025-05-22 DIAGNOSIS — M75.32 CALCIFIC TENDONITIS OF LEFT SHOULDER: Primary | ICD-10-CM

## 2025-05-22 PROCEDURE — 73030 X-RAY EXAM OF SHOULDER: CPT

## 2025-05-22 PROCEDURE — 99214 OFFICE O/P EST MOD 30 MIN: CPT | Performed by: PHYSICIAN ASSISTANT

## 2025-05-22 RX ORDER — PREDNISONE 20 MG/1
40 TABLET ORAL DAILY
Qty: 10 TABLET | Refills: 0 | Status: SHIPPED | OUTPATIENT
Start: 2025-05-22 | End: 2025-05-27

## 2025-05-22 RX ORDER — LIDOCAINE 50 MG/G
1 PATCH TOPICAL DAILY
Qty: 5 PATCH | Refills: 0 | Status: SHIPPED | OUTPATIENT
Start: 2025-05-22 | End: 2025-05-27

## 2025-05-22 NOTE — PATIENT INSTRUCTIONS
Rx lidocaine patches, place 1 patch on affected shoulder once per day, for no more than 12 hours, per day.  Rx prednisone, give 2 tablets once daily for 5 days.  Follow up with Dr. Cooper as soon as able.  Referral for Orthopedics sent.  Referral for PT sent.

## 2025-05-22 NOTE — PROGRESS NOTES
[facility]        NAME: Domenica Grider is a 60 y.o. female  : 1965    MRN: [id number]  DATE: May 22, 2025  TIME: 12:31 PM    Assessment and Plan   Calcific tendonitis of left shoulder [M75.32]  1. Calcific tendonitis of left shoulder  predniSONE 20 mg tablet    Ambulatory Referral to Orthopedic Surgery    XR shoulder 2+ vw left    Ambulatory Referral to Physical Therapy      2. Acute pain of left shoulder  predniSONE 20 mg tablet    Ambulatory Referral to Orthopedic Surgery    XR shoulder 2+ vw left    Ambulatory Referral to Physical Therapy            Patient Instructions     Rx lidocaine patches, place 1 patch on affected shoulder once per day, for no more than 12 hours, per day.  Rx prednisone, give 2 tablets once daily for 5 days.  Follow up with Dr. Cooper as soon as able.  Referral for Orthopedics sent.  Referral for PT sent.    If tests have been performed at [facility], our office will contact you with results if changes need to be made to the care plan discussed with you at the visit. You can review your full results on [facility] MyChart.     Chief Complaint     Chief Complaint   Patient presents with    Shoulder Pain     Left shoulder pain started 2 days ago, sharp, stabbing, causing nausea. H/o shoulder pain. Possible over use. Unsure of trauma. No falls. Pain worse with movement. ROM limited. Taking ES tylenol, cyclobenzaprine and supplements for pain with no relief. Swelling observed to left hand.          History of Present Illness       Patient is a 59 yo female with recent hx of calcific tendonitis of bilateral shoulders and received a cortisol injection in the L shoulder 6 months ago, who presents with acute L shoulder pain without trauma, beginning yesterday and is rated 8/10 constantly. She also notes her L hand is mildly swollen as compared with the R hand. She reports that it is extremely painful to move her shoulder, but denies numbness/tingling down the arm or in the L  hand. Denies gross deformity.    Shoulder Pain   The pain is present in the left shoulder. This is a recurrent problem. The current episode started yesterday. There has been no history of extremity trauma. The problem occurs constantly. The problem has been gradually worsening. The quality of the pain is described as aching and sharp. The pain is at a severity of 8/10. The pain is moderate. Associated symptoms include joint swelling and a limited range of motion. The symptoms are aggravated by activity. She has tried acetaminophen for the symptoms. The treatment provided no relief. Calcific tendonitis of bilateral shoulders       Review of Systems   Review of Systems   Constitutional: Negative.    Respiratory: Negative.     Cardiovascular: Negative.    Musculoskeletal:  Positive for arthralgias (L shoulder) and joint swelling (L shoulder).   Neurological: Negative.          Current Medications     Current Medications[1]    Current Allergies     Allergies as of 05/22/2025 - Reviewed 05/22/2025   Allergen Reaction Noted    Bactrim [sulfamethoxazole-trimethoprim] GI Intolerance 09/06/2017    Ciprofloxacin GI Intolerance 08/05/2015    Erythromycin GI Intolerance 09/06/2017    Erythromycin base  08/05/2015    Gluten meal - food allergy  03/20/2019    Ibuprofen GI Bleeding 04/08/2021    Lactose - food allergy  03/20/2019    Levaquin [levofloxacin] GI Intolerance 09/06/2017    Penicillins  07/31/2014    Tetracycline  03/20/2019    Valtrex [valacyclovir] Other (See Comments) 09/06/2017            The following portions of the patient's history were reviewed and updated as appropriate: allergies, current medications, past family history, past medical history, past social history, past surgical history and problem list.     Past Medical History[2]    Past Surgical History[3]    Family History[4]      Medications have been verified.        Objective   /84   Pulse (!) 119   Temp 97.9 °F (36.6 °C)   Resp 20   SpO2 97%         Physical Exam     Physical Exam  Vitals reviewed.   Constitutional:       Appearance: Normal appearance.     Cardiovascular:      Rate and Rhythm: Normal rate and regular rhythm.      Pulses: Normal pulses.      Heart sounds: Normal heart sounds. No murmur heard.  Pulmonary:      Effort: Pulmonary effort is normal.      Breath sounds: Normal breath sounds.     Musculoskeletal:         General: Swelling (L shoulder and mild edema of L hand) and tenderness (L shoulder) present.      Comments: Limited ROM of L shoulder     Skin:     General: Skin is warm and dry.      Capillary Refill: Capillary refill takes less than 2 seconds.      Findings: No rash.     Neurological:      General: No focal deficit present.      Mental Status: She is alert and oriented to person, place, and time.     Psychiatric:         Mood and Affect: Mood normal.         Behavior: Behavior normal.                        [1]   Current Outpatient Medications:     predniSONE 20 mg tablet, Take 2 tablets (40 mg total) by mouth daily for 5 days, Disp: 10 tablet, Rfl: 0    cyclobenzaprine (FLEXERIL) 10 mg tablet, Take 1 tablet (10 mg total) by mouth 3 (three) times a day as needed for muscle spasms for up to 15 days, Disp: 45 tablet, Rfl: 0    Diclofenac Sodium (VOLTAREN) 1 %, Apply 2 g topically 3 (three) times a day (Patient not taking: Reported on 2024), Disp: 150 g, Rfl: 2    nicotine polacrilex (Nicorette) 4 mg gum, Chew 1 each (4 mg total) in the morning, Disp: 100 each, Rfl: 0  [2]   Past Medical History:  Diagnosis Date    Choledocholithiasis 2017    Colon polyp     GERD (gastroesophageal reflux disease)    [3]   Past Surgical History:  Procedure Laterality Date     SECTION      CHOLECYSTECTOMY      CHOLECYSTECTOMY LAPAROSCOPIC N/A 2017    Procedure: CHOLECYSTECTOMY LAPAROSCOPIC, with IOC, possible open;  Surgeon: Nghia Roberts MD;  Location: [facility];  Service: General    DENTAL SURGERY      ERCP N/A 2017     Procedure: ENDOSCOPIC RETROGRADE CHOLANGIOPANCREATOGRAPHY (ERCP);  Surgeon: Femi Millan III, MD;  Location: [facility];  Service: Gastroenterology   [4]   Family History  Problem Relation Name Age of Onset    Hodgkin's lymphoma Mother      Cancer Father      Hypertension Father      Diabetes Father          of other type wth microalbuminuria, with long-term current use of insulin

## 2025-05-22 NOTE — LETTER
May 22, 2025     Patient: Domenica Grider   YOB: 1965   Date of Visit: 5/22/2025       To Whom it May Concern:    Domenica Grider was seen in my clinic on 5/22/2025. She may return to work on 5/27/25.    If you have any questions or concerns, please don't hesitate to call.         Sincerely,          Walt Liu PA-C        CC: No Recipients

## 2025-05-23 ENCOUNTER — TELEPHONE (OUTPATIENT)
Age: 60
End: 2025-05-23

## 2025-05-23 NOTE — TELEPHONE ENCOUNTER
Caller: Patient    Doctor: Dr. Cooper    Reason for call: The patient stated she went to [facility]images done for left shoulder pain. Scheduled appt 6/3. Has pain patches,sling,taking prednisone and tylenol. Pain constant w/movement. Pain 100/10. Questioned if there was anything else to take for the pain? Should she stop taking calcium supplements?    Call back#: [phone]

## 2025-05-23 NOTE — TELEPHONE ENCOUNTER
Spoke to patient. Explained not much else to offer for pain as she is taking tylenol, prednisone, a muscle relaxer, and is using lidocaine patches. She can continue her calcium supplements. I explained this is usually unrelated to developing calcific tendonitis. Patient notes she does have some improvement thus far with prednisone.  She appreciated the phone call.

## 2025-06-03 ENCOUNTER — OFFICE VISIT (OUTPATIENT)
Dept: OBGYN CLINIC | Facility: CLINIC | Age: 60
End: 2025-06-03
Payer: COMMERCIAL

## 2025-06-03 VITALS — HEIGHT: 63 IN | BODY MASS INDEX: 29.23 KG/M2 | WEIGHT: 165 LBS

## 2025-06-03 DIAGNOSIS — M25.361 KNEE INSTABILITY, RIGHT: ICD-10-CM

## 2025-06-03 DIAGNOSIS — M75.32 CALCIFIC TENDINITIS OF LEFT SHOULDER: Primary | ICD-10-CM

## 2025-06-03 DIAGNOSIS — S46.012D ROTATOR CUFF STRAIN, LEFT, SUBSEQUENT ENCOUNTER: ICD-10-CM

## 2025-06-03 DIAGNOSIS — S86.911A KNEE STRAIN, RIGHT, INITIAL ENCOUNTER: ICD-10-CM

## 2025-06-03 DIAGNOSIS — S46.011D ROTATOR CUFF STRAIN, RIGHT, SUBSEQUENT ENCOUNTER: ICD-10-CM

## 2025-06-03 PROCEDURE — 99214 OFFICE O/P EST MOD 30 MIN: CPT | Performed by: FAMILY MEDICINE

## 2025-06-03 RX ORDER — ACETAMINOPHEN 500 MG
500 TABLET ORAL EVERY 6 HOURS PRN
COMMUNITY

## 2025-06-03 RX ORDER — MELATONIN 10 MG
TABLET, SUBLINGUAL SUBLINGUAL
COMMUNITY

## 2025-06-03 RX ORDER — LIDOCAINE 50 MG/G
1 PATCH TOPICAL DAILY
Qty: 30 PATCH | Refills: 1 | Status: SHIPPED | OUTPATIENT
Start: 2025-06-03

## 2025-06-03 RX ORDER — CALCIUM/D3/MAG/K2/MIN/HERB 326 333-32 MG
TABLET ORAL
COMMUNITY

## 2025-06-03 NOTE — PROGRESS NOTES
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  X-rays left shoulder for progression of calcific tendinitis.   Clinical impression is that she has symptoms of calcific tendinopathy, which may indicate underlying rotator cuff tendinopathy.  Patient was given the option consider repeat steroid injection versus referral for ultrasound-guided lavage, which she declines at this time.    I will refer her to physical therapy which she is to start as soon as possible and do home exercises as directed.  She may continue topical lidocaine patch as needed.  She will follow-up if no improvement with at least 1 month of formal therapy.  Orders:    lidocaine (Lidoderm) 5 %; Apply 1 patch topically daily over 12 hours Remove & Discard patch within 12 hours or as directed by MD    Ambulatory Referral to Physical Therapy; Future    Rotator cuff strain, left, subsequent encounter    Orders:    Ambulatory Referral to Physical Therapy; Future    Rotator cuff strain, right, subsequent encounter    Orders:    Ambulatory Referral to Physical Therapy; Future    Knee strain, right, initial encounter  60-year-old female with right knee pain following recent injury.  Clinical impression that she may have sustained strain to the knee.  May continue wearing supportive knee sleeve.  Start formal therapy as soon as possible and do home exercises as directed.  Follow-up if no improvement with 4 weeks of formal therapy.  Orders:    Ambulatory Referral to Physical Therapy; Future    Knee instability, right  60-year-old female with right knee pain following recent  "injury.  Clinical impression that she may have sustained strain to the knee.  May continue wearing supportive knee sleeve.  Start formal therapy as soon as possible and do home exercises as directed.  Follow-up if no improvement with 4 weeks of formal therapy.  Orders:    Ambulatory Referral to Physical Therapy; Future        History of Present Illness   Chief Complaint   Patient presents with    Left Shoulder - Follow-up, Pain      HPI  Domenica Grider is a 60 y.o. female right-hand-dominant with left shoulder pain more than several years duration who presents for follow-up worsening left shoulder pain 3 weeks ago.  She was last seen by me 7 months ago at which point she was given steroid injection to the left shoulder.  She reports that following steroid injection experiencing gradual improvement in symptoms, but no resolution.  She reports worsening pain about 3 weeks ago without trauma or inciting event.  Pain described as generalized to the left shoulder but worse to the anterior lateral aspect, throbbing and burning and sharp, severe in intensity, worse with moving the arm, associated with limited range of motion, and improved with resting.  She has tried over-the-counter remedies and icing to help with symptoms.  She presented to urgent care where x-ray was noted for calcific tendinopathy.  She was placed in arm sling, prescribed course of oral steroid, and advised to follow-up with orthopedic care.  She states that intense pain has subsided but still has pain with elevating the arm above shoulder level.  She states that recently she injured her right knee while trying to stop her son from falling.  She has been wearing a knee sleeve to help with stability and knee.    History obtained from: patient    Review of Systems   Musculoskeletal:  Positive for arthralgias. Negative for joint swelling.   Neurological:  Negative for weakness and numbness.          Objective   Ht 5' 3\" (1.6 m)   Wt 74.8 kg (165 lb)   " BMI 29.23 kg/m²      Physical Exam  Vitals and nursing note reviewed.   Constitutional:       Appearance: Normal appearance. She is well-developed. She is not ill-appearing or diaphoretic.   HENT:      Head: Normocephalic and atraumatic.      Right Ear: External ear normal.      Left Ear: External ear normal.     Eyes:      Conjunctiva/sclera: Conjunctivae normal.     Neck:      Trachea: No tracheal deviation.   Pulmonary:      Effort: Pulmonary effort is normal. No respiratory distress.   Abdominal:      General: There is no distension.     Skin:     General: Skin is warm and dry.      Coloration: Skin is not jaundiced or pale.     Neurological:      Mental Status: She is alert and oriented to person, place, and time.     Psychiatric:         Mood and Affect: Mood normal.         Behavior: Behavior normal.         Thought Content: Thought content normal.         Judgment: Judgment normal.       Right Hand Exam     Muscle Strength   The patient has normal right wrist strength.    Other   Sensation: normal  Pulse: present      Left Hand Exam     Muscle Strength   The patient has normal left wrist strength.    Other   Sensation: normal  Pulse: present      Right Shoulder Exam     Range of Motion   Active abduction:  170   Forward flexion:  170   Internal rotation 0 degrees:  Lumbar     Muscle Strength   Abduction: 5/5   Internal rotation: 5/5   External rotation: 5/5   Supraspinatus: 5/5       Left Shoulder Exam     Range of Motion   Active abduction:  160   Forward flexion:  160   Internal rotation 0 degrees:  Lumbar     Muscle Strength   Abduction: 5/5   Internal rotation: 5/5   External rotation: 4/5   Supraspinatus: 5/5     Tests   Fermin test: positive     Comments:  Pain with empty can           I have personally reviewed pertinent films in PACS and my interpretation is  .   X-rays left shoulder for progression of calcific tendinitis.       Administrative Statements   I have spent a total time of 31 minutes in  caring for this patient on the day of the visit/encounter including Diagnostic results, Prognosis, Risks and benefits of tx options, Instructions for management, Patient and family education, Impressions, Documenting in the medical record, Reviewing/placing orders in the medical record (including tests, medications, and/or procedures), and Obtaining or reviewing history  .

## 2025-06-03 NOTE — ASSESSMENT & PLAN NOTE
60-year-old female with right knee pain following recent injury.  Clinical impression that she may have sustained strain to the knee.  May continue wearing supportive knee sleeve.  Start formal therapy as soon as possible and do home exercises as directed.  Follow-up if no improvement with 4 weeks of formal therapy.  Orders:    Ambulatory Referral to Physical Therapy; Future

## 2025-06-03 NOTE — ASSESSMENT & PLAN NOTE
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  X-rays left shoulder for progression of calcific tendinitis.   Clinical impression is that she has symptoms of calcific tendinopathy, which may indicate underlying rotator cuff tendinopathy.  Patient was given the option consider repeat steroid injection versus referral for ultrasound-guided lavage, which she declines at this time.    I will refer her to physical therapy which she is to start as soon as possible and do home exercises as directed.  She may continue topical lidocaine patch as needed.  She will follow-up if no improvement with at least 1 month of formal therapy.  Orders:    lidocaine (Lidoderm) 5 %; Apply 1 patch topically daily over 12 hours Remove & Discard patch within 12 hours or as directed by MD    Ambulatory Referral to Physical Therapy; Future

## 2025-06-04 ENCOUNTER — TELEPHONE (OUTPATIENT)
Age: 60
End: 2025-06-04

## 2025-06-04 NOTE — TELEPHONE ENCOUNTER
PA for LIDOCAINE 5%  DENIED    Reason:(        Message sent to office clinical pool Yes    Denial letter scanned into Media Yes    We can gladly do an appeal but the process can take about 30-60 days to provide determination. Please have the office staff schedule a Peer to Peer at phone   . If an appeal is truly warranted please have Provider send clinical documentation to the PA department to support the appeal.     **Please follow up with your patient regarding denial and next steps**

## 2025-06-04 NOTE — TELEPHONE ENCOUNTER
PA for LIDOCAINE SUBMITTED to [employer]    via    [x]CMM-KEY  [id number]  []Surescripts-Case ID #     []Availity-Auth ID #   NDC #     []Faxed to plan   []Other website    []Phone call Case ID #      [x]PA sent as URGENT    All office notes, labs and other pertaining documents and studies sent. Clinical questions answered. Awaiting determination from insurance company.     Turnaround time for your insurance to make a decision on your Prior Authorization can take 7-21 business days.

## 2025-06-09 ENCOUNTER — EVALUATION (OUTPATIENT)
Dept: PHYSICAL THERAPY | Facility: CLINIC | Age: 60
End: 2025-06-09
Attending: PHYSICIAN ASSISTANT
Payer: COMMERCIAL

## 2025-06-09 DIAGNOSIS — S86.911D KNEE STRAIN, RIGHT, SUBSEQUENT ENCOUNTER: Primary | ICD-10-CM

## 2025-06-09 DIAGNOSIS — M25.361 KNEE INSTABILITY, RIGHT: ICD-10-CM

## 2025-06-09 DIAGNOSIS — M75.32 CALCIFIC TENDONITIS OF LEFT SHOULDER: ICD-10-CM

## 2025-06-09 DIAGNOSIS — S46.012D STRAIN OF LEFT ROTATOR CUFF CAPSULE, SUBSEQUENT ENCOUNTER: ICD-10-CM

## 2025-06-09 PROCEDURE — 97110 THERAPEUTIC EXERCISES: CPT | Performed by: PHYSICAL THERAPIST

## 2025-06-09 PROCEDURE — 97162 PT EVAL MOD COMPLEX 30 MIN: CPT | Performed by: PHYSICAL THERAPIST

## 2025-06-09 NOTE — PROGRESS NOTES
PT Evaluation     Today's date: 2025  Patient name: Domenica Grider  : 1965  MRN: [id number]  Referring provider: Walt Liu PA-C  Dx:   Encounter Diagnosis     ICD-10-CM    1. Knee strain, right, subsequent encounter  S86.911D       2. Knee instability, right  M25.361       3. Calcific tendonitis of left shoulder  M75.32       4. Strain of left rotator cuff capsule, subsequent encounter  S46.012D           Start Time: 804  Stop Time: 920  Total time in clinic (min): 76 minutes    Assessment  Impairments: abnormal gait, abnormal or restricted ROM, activity intolerance, impaired physical strength, lacks appropriate home exercise program, pain with function, weight-bearing intolerance, poor posture  and poor body mechanics    Assessment details: Domenica Grider is a 60 y.o. female who presents with chronic left shoulder pain and acute right knee pain. Patient describes painful shoulder ROM and lifting, limiting her ability to carry her grandchild, cleaning, and driving. Patient's knee pain disrupts her ability to perform weightbearing activities, twist, and perform lateral movements. Examination findings demonstrate painful shoulder and knee ROM, painful patellar mobility, limited shoulder girdle strength, and limited functional mobility. Patient's clinical presentation is consistent with their referring diagnosis of left shoulder calcific tendonitis. Right knee differential diagnosis includes low grade MCL sprain, PFPS, or medial meniscus pathology. Patient would benefit from skilled physical therapy to address their aforementioned impairments, improve their level of function and to improve their overall quality of life.  Understanding of Dx/Px/POC: excellent     Prognosis: excellent    Goals  Short Term Goals: to be achieved by 4 weeks  1) Patient to be independent with basic HEP.  2) Decrease pain to 6/10 at it's worst.  3) Increase UE strength by 1/2 MMT grade in all deficient planes.  4)  Increase UE ROM by > 5 deg in all deficient planes.  5) Patient to report decreased sleep interruption secondary to pain.    Long Term Goals: to be achieved by discharge  1) FOTO equal to or greater than 71.  2) Patient to be independent with comprehensive HEP.  3) Abolish pain for improved quality of life.  4) Increase UE strength to 5/5 MMT grade in all deficient planes to improve a/iadls.  5) Increase UE ROM to within 5 deg of contralateral UE to improve a/iadls.  6) Patient to report no sleep interruption secondary to pain.    Plan  Patient would benefit from: skilled PT  Planned modality interventions: biofeedback, cryotherapy, hydrotherapy, unattended electrical stimulation, thermotherapy: hydrocollator packs and low level laser therapy    Planned therapy interventions: activity modification, ADL retraining, behavior modification, body mechanics training, functional ROM exercises, home exercise program, IADL retraining, joint mobilization, manual therapy, massage, neuromuscular re-education, patient education, postural training, strengthening, stretching, therapeutic activities and therapeutic exercise    Frequency: 2-3x week.  Duration in weeks: 12  Plan of Care beginning date: 6/9/2025  Plan of Care expiration date: 9/1/2025  Treatment plan discussed with: patient        Subjective Evaluation    History of Present Illness  Mechanism of injury: Patient presents with h/o chronic left shoulder pain over the past several years recently flaring up in the past year. Patient went to ortho where she received a steroid injection, which failed to provide relief. On 5/22/25 she developed severe, worsening pain and immobility leading her to go to Urgent Care where she was placed in a sling and prescribed Prednisone, but limited her dosage d/t negative side effects. Patient's strength has improved, but remains limited with lifting d/t pain, weakness, and fear of reinjury. Patient is limited when cleaning and driving.  "Patient has jolting pain when driving with her arm. Patient has difficulty lifting her grandchild out of her crib. Patient denies experiencing tingling/numbness, crepitus, or unstable. Patient is currently being treated with medication for Lyme's disease, beginning on 6/7/25.    Patient presents with secondary c/o acute onset of right knee pain beginning approximately 1 week ago when she kneeled and twisted. Patient feels that her knee is gradually improving. Patient continues to have pain with weightbearing, twisting, and moving her knee side to side. Patient has new onset of clicking since her injury. Patient has a slight sense of instability. Patient denies experiencing tingling/numbness.   Patient Goals  Patient goals for therapy: decreased pain, increased motion, increased strength, independence with ADLs/IADLs and return to sport/leisure activities    Pain  Pain scale: Left shoulder: 3/10 ; Right knee: 3/10.  Pain scale at lowest: Left shoulder: 1/10 ; Right knee: 3/10.  Pain scale at highest: Left shoulder: 10/10 ; Right knee: 10/10.  Location: left shoulder: diffuse ; right knee: sharp, stabbing, sore  Quality: dull ache  Alleviating factors: Lidocaine, steroid injection, Flexeril, elevation, ice.    Social Support    Employment status: working  Hand dominance: right (Realtor, [employer], [employer] trucks and motorcyles)      Diagnostic Tests  X-ray: abnormal (Left shoulder: \"Rotator cuff calcific tendinosis.\")        Objective     Postural Observations  Seated posture: fair  Standing posture: fair      Active Range of Motion   Left Shoulder   Flexion: 160 degrees   Abduction: 150 degrees   External rotation BTH: T3   Internal rotation BTB: T8     Right Shoulder   Flexion: 160 degrees   Abduction: 160 degrees   External rotation BTH: T3   Internal rotation BTB: T8   Left Knee   Normal active range of motion    Right Knee   Normal active range of motion  Flexion: with pain    Passive " Range of Motion   Left Shoulder   Flexion: 165 degrees   Abduction: 160 degrees   External rotation 90°: 92 degrees   Internal rotation 90°: 60 degrees     Right Shoulder   Flexion: 175 degrees   Abduction: 175 degrees   External rotation 90°: 105 degrees   Internal rotation 90°: 60 degrees   Left Knee   Normal passive range of motion    Right Knee   Normal passive range of motion  Flexion: with pain    Mobility   Patellar Mobility:   Left Knee   WFL: medial, lateral, superior and inferior.     Right Knee   WFL: medial, lateral, superior and inferior    Patellar Mobility Comments   Right medial tendon comments: (+) pain. Right lateral tendon comments: (+) pain.     Strength/Myotome Testing     Left Shoulder     Planes of Motion   Flexion: 4-   Abduction: 4   External rotation at 0°: 5   Internal rotation at 0°: 5     Isolated Muscles   Lower trapezius: 4-   Middle trapezius: 4     Right Shoulder   Normal muscle strength    Left Elbow   Normal strength    Right Elbow   Normal strength    Left Wrist/Hand   Normal wrist strength    Right Wrist/Hand   Normal wrist strength    Left Hip   Planes of Motion   Flexion: 5  Extension: 5  Abduction: 5    Right Hip   Planes of Motion   Flexion: 5  Extension: 5  Abduction: 4+    Left Knee   Normal strength    Right Knee   Normal strength    Left Ankle/Foot   Dorsiflexion: 5    Right Ankle/Foot   Dorsiflexion: 5    Tests     Left Knee   Negative anterior Lachman, patella-femoral grind, valgus stress test at 0 degrees, valgus stress test at 30 degrees, varus stress test at 0 degrees and varus stress test at 30 degrees.     Right Knee   Positive medial Zahra, patella-femoral grind and valgus stress test at 0 degrees ((+) pain).   Negative anterior Lachman, lateral Zahra, valgus stress test at 30 degrees, varus stress test at 0 degrees and varus stress test at 30 degrees.     Ambulation   Weight-Bearing Status   Weight-Bearing Status (Left): full weight bearing   Weight-Bearing  "Status (Right): full weight-bearing    Assistive device used: none    Ambulation: Level Surfaces   Ambulation without assistive device: independent    Observational Gait   Gait: antalgic     Functional Assessment      Squat    Left within functional limits and right within functional limits.     Comments  Forward step up 8\" step: WFL bilaterally  Forward step down 8\" step: L: WFL ; R: Mod I with unilateral hand rail, fair eccentric control, (+) pain               POC expires Unit limit Auth  expiration date PT/OT + Visit Limit?   9/1/25 N/A N/A 36                 Visit/Unit Tracking  AUTH Status:  Date 6/9              N/A Used 1               Remaining                  Precautions: standard      Manuals 6/9                                                                Neuro Re-Ed             Webslide: M, L row             Bent over row                                                                              Ther Ex             Patient education: pathophysiology, differential diagnosis, weightbearing exercise for osteoporosis             Nustep UE/LE             Prone quad str.             Supine SLR             Side lying hip abduction             LAQ             Standing h/s curl                          Shoulder extension AAROM with SPC             TB IR             TB ER             ER str. At 90/90 against wall                                                    Ther Activity             Squats             LSD                          Gait Training                                       Modalities                                            "

## 2025-06-11 ENCOUNTER — OFFICE VISIT (OUTPATIENT)
Dept: PHYSICAL THERAPY | Facility: CLINIC | Age: 60
End: 2025-06-11
Attending: PHYSICIAN ASSISTANT
Payer: COMMERCIAL

## 2025-06-11 DIAGNOSIS — M25.361 KNEE INSTABILITY, RIGHT: ICD-10-CM

## 2025-06-11 DIAGNOSIS — S86.911D KNEE STRAIN, RIGHT, SUBSEQUENT ENCOUNTER: Primary | ICD-10-CM

## 2025-06-11 PROCEDURE — 97110 THERAPEUTIC EXERCISES: CPT | Performed by: PHYSICAL THERAPIST

## 2025-06-11 NOTE — PROGRESS NOTES
"Daily Note     Today's date: 2025  Patient name: Domenica Grider  : 1965  MRN: [id number]  Referring provider: Walt Liu PA-C  Dx:   Encounter Diagnosis     ICD-10-CM    1. Knee strain, right, subsequent encounter  S86.911D       2. Knee instability, right  M25.361           Start Time: 0756  Stop Time: 0849  Total time in clinic (min): 53 minutes    Subjective: Patient reports that she has been trying to push herself and be more active.      Objective: See treatment diary below      Assessment: Tolerated treatment well. Patient demonstrated fatigue post treatment and would benefit from continued PT. Patient continues to have knee pain with twisting. Challenged with TB strengthening exercises. Reviewed HEP to promote independence in home.      Plan: Continue per plan of care.  Progress treatment as tolerated.         POC expires Unit limit Auth  expiration date PT/OT + Visit Limit?   25 N/A N/A 36                 Visit/Unit Tracking  AUTH Status:  Date              N/A Used 1 2              Remaining                  Precautions: standard      Manuals                                                                Neuro Re-Ed             Webslide: M, L row             Bent over row                                                                              Ther Ex             Patient education: pathophysiology, differential diagnosis, weightbearing exercise for osteoporosis  HEP review           Nustep UE/LE  L3 10 min           Prone quad str.  3x30\"           Supine SLR  2x10           Side lying hip abduction  2x10           LAQ  BTB x15           Standing h/s curl  BTB x15                        Shoulder extension AAROM with SPC             TB IR             TB ER             ER str. At 90/90 against wall                                                    Ther Activity             Squats             LSD             Total gym: squats  L22 3x10           Gait Training        "                                Modalities                                       Access Code: [email or web address]  URL: [email or web address]  Date: 06/11/2025  Prepared by: Fred Prajapati    Exercises  - Gastroc Stretch on Book  - 1 x daily - 7 x weekly - 3 sets - 1 reps - 30 seconds hold  - Prone Quadriceps Stretch with Strap  - 1 x daily - 7 x weekly - 3 sets - 1 reps - 30 seconds hold  - Squat with Counter Support  - 1 x daily - 4 x weekly - 2-3 sets - 10 reps  - Active Straight Leg Raise with Quad Set  - 1 x daily - 4 x weekly - 2-3 sets - 10 reps  - Sidelying Hip Abduction  - 1 x daily - 4 x weekly - 2-3 sets - 10 reps  - Sitting Knee Extension with Resistance  - 1 x daily - 4 x weekly - 2-3 sets - 10 reps  - Standing Hamstring Curl with Resistance  - 1 x daily - 4 x weekly - 2-3 sets - 10 reps

## 2025-06-16 ENCOUNTER — OFFICE VISIT (OUTPATIENT)
Dept: PHYSICAL THERAPY | Facility: CLINIC | Age: 60
End: 2025-06-16
Attending: PHYSICIAN ASSISTANT
Payer: COMMERCIAL

## 2025-06-16 DIAGNOSIS — M75.32 CALCIFIC TENDONITIS OF LEFT SHOULDER: Primary | ICD-10-CM

## 2025-06-16 DIAGNOSIS — S46.012D STRAIN OF LEFT ROTATOR CUFF CAPSULE, SUBSEQUENT ENCOUNTER: ICD-10-CM

## 2025-06-16 PROCEDURE — 97110 THERAPEUTIC EXERCISES: CPT | Performed by: PHYSICAL THERAPIST

## 2025-06-16 PROCEDURE — 97112 NEUROMUSCULAR REEDUCATION: CPT | Performed by: PHYSICAL THERAPIST

## 2025-06-16 NOTE — PROGRESS NOTES
"Daily Note     Today's date: 2025  Patient name: Domenica Grider  : 1965  MRN: [id number]  Referring provider: Walt Liu PA-C  Dx:   Encounter Diagnosis     ICD-10-CM    1. Calcific tendonitis of left shoulder  M75.32       2. Strain of left rotator cuff capsule, subsequent encounter  S46.012D                      Subjective: Patient states her shoulder is feeling a little better.  Knee muscles were a little sore after last session.      Objective: See treatment diary below      Assessment: Tolerated treatment well. Patient demonstrated fatigue post treatment and would benefit from continued PT.  Initially had some soreness with shoulder extension using cane, but felt better the more she did it.  Provided with GTB for home use.  Patient states shoulder was sore and she \"knew I used it\" after session, but denies significant pain.      Plan: Continue per plan of care.  Progress treatment as tolerated.         POC expires Unit limit Auth  expiration date PT/OT + Visit Limit?   25 N/A N/A 36                 Visit/Unit Tracking  AUTH Status:  Date             N/A Used 1 2 3             Remaining                  Precautions: standard      Manuals                                                               Neuro Re-Ed             Webslide: M, L row   GTB 3\" 3x10          Bent over row   3\"x30                                                                           Ther Ex             Patient education: pathophysiology, differential diagnosis, weightbearing exercise for osteoporosis  HEP review           Nustep seat 6 UE/LE  L3 10 min L3x10          Prone quad str.  3x30\"           Supine SLR  2x10           Side lying hip abduction  2x10           LAQ  BTB x15           Standing h/s curl  BTB x15                        Shoulder extension AAROM with SPC   5\"x20          TB IR   GTB 2x10          TB ER   GTB 2x10          ER str. At 90/90 against wall   10\"x10             "                                     Ther Activity             Squats             LSD             Total gym: squats  L22 3x10           Gait Training                                       Modalities                                       Access Code: [email or web address]  URL: [email or web address]  Date: 06/11/2025  Prepared by: Fred Prajapati    Exercises  - Gastroc Stretch on Book  - 1 x daily - 7 x weekly - 3 sets - 1 reps - 30 seconds hold  - Prone Quadriceps Stretch with Strap  - 1 x daily - 7 x weekly - 3 sets - 1 reps - 30 seconds hold  - Squat with Counter Support  - 1 x daily - 4 x weekly - 2-3 sets - 10 reps  - Active Straight Leg Raise with Quad Set  - 1 x daily - 4 x weekly - 2-3 sets - 10 reps  - Sidelying Hip Abduction  - 1 x daily - 4 x weekly - 2-3 sets - 10 reps  - Sitting Knee Extension with Resistance  - 1 x daily - 4 x weekly - 2-3 sets - 10 reps  - Standing Hamstring Curl with Resistance  - 1 x daily - 4 x weekly - 2-3 sets - 10 reps

## 2025-06-16 NOTE — PROGRESS NOTES
"Daily Note     Today's date: 2025  Patient name: Domenica Grider  : 1965  MRN: [id number]  Referring provider: Walt Liu PA-C  Dx:   Encounter Diagnosis     ICD-10-CM    1. Knee strain, right, subsequent encounter  S86.911D       2. Knee instability, right  M25.361                      Subjective: Patient feels she is walking better.  Knee is getting a little stronger.  Still has difficulty with stairs.  Patient says she is having some difficulty straightening her leg.      Objective: See treatment diary below      Assessment: Tolerated treatment well. Patient demonstrated fatigue post treatment and would benefit from continued PT.  Added quad sets, forward step-ups and heel slides for ROM and strength.  Unable to perform lateral step-downs due to complaints of significant pain.  Knee felt more loose after PT session      Plan: Continue per plan of care.  Progress treatment as tolerated.         POC expires Unit limit Auth  expiration date PT/OT + Visit Limit?   25 N/A N/A 36                 Visit/Unit Tracking  AUTH Status:  Date            N/A Used 1 2 3 4            Remaining                  Precautions: standard      Manuals                                                              Neuro Re-Ed             Webslide: M, L row   GTB 3\" 3x10          Bent over row   3\"x30                                                                           Ther Ex             Patient education: pathophysiology, differential diagnosis, weightbearing exercise for osteoporosis  HEP review           Nustep seat 6 UE/LE  L3 10 min L3x10 L4x10'         Prone quad str.  3x30\"  3x30\"         Supine SLR  2x10  2x10         Side lying hip abduction  2x10  2x10         LAQ  BTB x15  BTB 2x10         Standing h/s curl  BTB x15  Seated BTB 2x10         Supine heel slide    Strap 3\"x20         QS    5\"x20         Shoulder extension AAROM with SPC   5\"x20          TB IR   GTB " "2x10          TB ER   GTB 2x10          ER str. At 90/90 against wall   10\"x10                                                 Ther Activity             Squats             Fwd step-ups    6\" 2x10         LSD    Unable         Total gym: squats  L22 3x10  L22 3x10         Gait Training                                       Modalities                                       Access Code: [email or web address]  URL: [email or web address]  Date: 06/11/2025  Prepared by: Fred Prajapati    Exercises  - Gastroc Stretch on Book  - 1 x daily - 7 x weekly - 3 sets - 1 reps - 30 seconds hold  - Prone Quadriceps Stretch with Strap  - 1 x daily - 7 x weekly - 3 sets - 1 reps - 30 seconds hold  - Squat with Counter Support  - 1 x daily - 4 x weekly - 2-3 sets - 10 reps  - Active Straight Leg Raise with Quad Set  - 1 x daily - 4 x weekly - 2-3 sets - 10 reps  - Sidelying Hip Abduction  - 1 x daily - 4 x weekly - 2-3 sets - 10 reps  - Sitting Knee Extension with Resistance  - 1 x daily - 4 x weekly - 2-3 sets - 10 reps  - Standing Hamstring Curl with Resistance  - 1 x daily - 4 x weekly - 2-3 sets - 10 reps     "

## 2025-06-17 ENCOUNTER — OFFICE VISIT (OUTPATIENT)
Dept: PHYSICAL THERAPY | Facility: CLINIC | Age: 60
End: 2025-06-17
Attending: PHYSICIAN ASSISTANT
Payer: COMMERCIAL

## 2025-06-17 DIAGNOSIS — M25.361 KNEE INSTABILITY, RIGHT: ICD-10-CM

## 2025-06-17 DIAGNOSIS — S86.911D KNEE STRAIN, RIGHT, SUBSEQUENT ENCOUNTER: Primary | ICD-10-CM

## 2025-06-17 PROCEDURE — 97530 THERAPEUTIC ACTIVITIES: CPT | Performed by: PHYSICAL THERAPIST

## 2025-06-17 PROCEDURE — 97110 THERAPEUTIC EXERCISES: CPT | Performed by: PHYSICAL THERAPIST

## 2025-06-23 ENCOUNTER — OFFICE VISIT (OUTPATIENT)
Dept: PHYSICAL THERAPY | Facility: CLINIC | Age: 60
End: 2025-06-23
Attending: PHYSICIAN ASSISTANT
Payer: COMMERCIAL

## 2025-06-23 DIAGNOSIS — M25.361 KNEE INSTABILITY, RIGHT: ICD-10-CM

## 2025-06-23 DIAGNOSIS — S86.911D KNEE STRAIN, RIGHT, SUBSEQUENT ENCOUNTER: Primary | ICD-10-CM

## 2025-06-23 DIAGNOSIS — M75.32 CALCIFIC TENDONITIS OF LEFT SHOULDER: ICD-10-CM

## 2025-06-23 DIAGNOSIS — S46.012D STRAIN OF LEFT ROTATOR CUFF CAPSULE, SUBSEQUENT ENCOUNTER: ICD-10-CM

## 2025-06-23 PROCEDURE — 97110 THERAPEUTIC EXERCISES: CPT

## 2025-06-23 PROCEDURE — 97112 NEUROMUSCULAR REEDUCATION: CPT

## 2025-06-23 NOTE — PROGRESS NOTES
"Daily Note     Today's date: 2025  Patient name: Domenica Grider  : 1965  MRN: [id number]  Referring provider: Walt Liu PA-C  Dx: No diagnosis found.    Start Time: 0759  Stop Time: 0844  Total time in clinic (min): 45 minutes    Subjective: Pt noted that she is still having pain but noted that she is able to do more t/o the day.       Objective: See treatment diary below      Assessment:  Continued with treatment session at this time was able to complete all the exercises with no changes in pain status. Overall verbal cues t/o session for proper forma nd technique.Tolerated treatment well. Patient demonstrated fatigue post treatment, exhibited good technique with therapeutic exercises, and would benefit from continued PT  S/P treatment did not having some soreness but o changes in pain status.     Plan: Continue per plan of care.        POC expires Unit limit Auth  expiration date PT/OT + Visit Limit?   25 N/A N/A 36                 Visit/Unit Tracking  AUTH Status:  Date           N/A Used 1 2 3 4 5           Remaining  35 34 33 32 31            Precautions: standard      Manuals                                                             Neuro Re-Ed             Webslide: M, L row   GTB 3\" 3x10  GTB 2x 10 3\"         Bent over row   3\"x30                                                                           Ther Ex             Patient education: pathophysiology, differential diagnosis, weightbearing exercise for osteoporosis  HEP review   DOMS and HEP         Nustep seat 6 UE/LE  L3 10 min L3x10 L4x10' L4 10 min  with UE at 8         Prone quad str.  3x30\"  3x30\"         Supine SLR  2x10  2x10         Side lying hip abduction  2x10  2x10 Standing 2x 10 ea. Hip abd and ext.         LAQ  BTB x15  BTB 2x10         Standing h/s curl  BTB x15  Seated BTB 2x10         Supine heel slide    Strap 3\"x20 Strap 3\" 2x 10         QS    5\"x20       " "  Shoulder extension AAROM with SPC   5\"x20          TB IR   GTB 2x10  Gtb 2x 10         TB ER   GTB 2x10  GTB 2x 10         ER str. At 90/90 against wall   10\"x10                                                 Ther Activity             Squats             Fwd step-ups    6\" 2x10 6\" 3x 10         LSD    Unable Lateral step ups 6\" 2x10         Total gym: squats  L22 3x10  L22 3x10         Gait Training                                       Modalities                                       Access Code: [email or web address]  URL: [email or web address]  Date: 06/11/2025  Prepared by: Fred Prajapati    Exercises  - Gastroc Stretch on Book  - 1 x daily - 7 x weekly - 3 sets - 1 reps - 30 seconds hold  - Prone Quadriceps Stretch with Strap  - 1 x daily - 7 x weekly - 3 sets - 1 reps - 30 seconds hold  - Squat with Counter Support  - 1 x daily - 4 x weekly - 2-3 sets - 10 reps  - Active Straight Leg Raise with Quad Set  - 1 x daily - 4 x weekly - 2-3 sets - 10 reps  - Sidelying Hip Abduction  - 1 x daily - 4 x weekly - 2-3 sets - 10 reps  - Sitting Knee Extension with Resistance  - 1 x daily - 4 x weekly - 2-3 sets - 10 reps  - Standing Hamstring Curl with Resistance  - 1 x daily - 4 x weekly - 2-3 sets - 10 reps       "

## 2025-06-24 ENCOUNTER — OFFICE VISIT (OUTPATIENT)
Dept: PHYSICAL THERAPY | Facility: CLINIC | Age: 60
End: 2025-06-24
Attending: PHYSICIAN ASSISTANT
Payer: COMMERCIAL

## 2025-06-24 DIAGNOSIS — S46.012D STRAIN OF LEFT ROTATOR CUFF CAPSULE, SUBSEQUENT ENCOUNTER: ICD-10-CM

## 2025-06-24 DIAGNOSIS — M25.361 KNEE INSTABILITY, RIGHT: ICD-10-CM

## 2025-06-24 DIAGNOSIS — S86.911D KNEE STRAIN, RIGHT, SUBSEQUENT ENCOUNTER: Primary | ICD-10-CM

## 2025-06-24 DIAGNOSIS — M75.32 CALCIFIC TENDONITIS OF LEFT SHOULDER: ICD-10-CM

## 2025-06-24 PROCEDURE — 97112 NEUROMUSCULAR REEDUCATION: CPT

## 2025-06-24 PROCEDURE — 97110 THERAPEUTIC EXERCISES: CPT

## 2025-06-24 NOTE — PROGRESS NOTES
"Daily Note     Today's date: 2025  Patient name: Domenica Grider  : 1965  MRN: [id number]  Referring provider: Walt Liu PA-C  Dx:   Encounter Diagnosis     ICD-10-CM    1. Knee strain, right, subsequent encounter  S86.911D       2. Knee instability, right  M25.361       3. Calcific tendonitis of left shoulder  M75.32       4. Strain of left rotator cuff capsule, subsequent encounter  S46.012D                      Subjective: Patient states her shoulder is feeling it but she was in and out of the pool and lifting kids.       Objective: See treatment diary below      Assessment: Modified program due to subjective report. Focus on LE strengthening and ROM to tolerance on L shoulder. Stretch sensations at available end range for shoulder. Able to add resistance to standing hip series. Did note feeling discomfort in knee in SLS on R but no lasting discomfort. Min cues for technique. Patient demonstrated fatigue post treatment and would benefit from continued PT      Plan: Progress treatment as tolerated.         POC expires Unit limit Auth  expiration date PT/OT + Visit Limit?   25 N/A N/A 36                 Visit/Unit Tracking  AUTH Status:  Date          N/A Used 1 2 3 4 5 6          Remaining  35 34 33 32 31 30           Precautions: standard      Manuals                                                            Neuro Re-Ed             Webslide: M, L row   GTB 3\" 3x10  GTB 2x 10 3\"         Bent over row   3\"x30                                                                           Ther Ex             Patient education: pathophysiology, differential diagnosis, weightbearing exercise for osteoporosis  HEP review   DOMS and HEP         Nustep seat 6 UE/LE  L3 10 min L3x10 L4x10' L4 10 min  with UE at 8  L4 10 min  with UE at 8        Prone quad str.  3x30\"  3x30\"         Supine SLR  2x10  2x10         Side lying hip abduction  2x10  2x10 " "Standing 2x 10 ea. Hip abd and ext.  1#   Standing 2x10 ea       LAQ  BTB x15  BTB 2x10         Standing h/s curl  BTB x15  Seated BTB 2x10         Supine heel slide    Strap 3\"x20 Strap 3\" 2x 10  HEP       QS    5\"x20         Shoulder extension AAROM with SPC   5\"x20   2x10 5\"        Cane AAROM flex/abd      2x10 ea       TB IR   GTB 2x10  Gtb 2x 10         TB ER   GTB 2x10  GTB 2x 10         ER str. At 90/90 against wall   10\"x10                                                 Ther Activity             Squats             Fwd step-ups    6\" 2x10 6\" 3x 10  6\" 3x10        LSD    Unable Lateral step ups 6\" 2x10  LSU  6\"   3x10        Total gym: squats  L22 3x10  L22 3x10  L22 3x10        Gait Training                                       Modalities                                       Access Code: [email or web address]  URL: [email or web address]  Date: 06/11/2025  Prepared by: Fred Prajapati    Exercises  - Gastroc Stretch on Book  - 1 x daily - 7 x weekly - 3 sets - 1 reps - 30 seconds hold  - Prone Quadriceps Stretch with Strap  - 1 x daily - 7 x weekly - 3 sets - 1 reps - 30 seconds hold  - Squat with Counter Support  - 1 x daily - 4 x weekly - 2-3 sets - 10 reps  - Active Straight Leg Raise with Quad Set  - 1 x daily - 4 x weekly - 2-3 sets - 10 reps  - Sidelying Hip Abduction  - 1 x daily - 4 x weekly - 2-3 sets - 10 reps  - Sitting Knee Extension with Resistance  - 1 x daily - 4 x weekly - 2-3 sets - 10 reps  - Standing Hamstring Curl with Resistance  - 1 x daily - 4 x weekly - 2-3 sets - 10 reps         "

## 2025-06-30 ENCOUNTER — OFFICE VISIT (OUTPATIENT)
Dept: PHYSICAL THERAPY | Facility: CLINIC | Age: 60
End: 2025-06-30
Attending: PHYSICIAN ASSISTANT
Payer: COMMERCIAL

## 2025-06-30 DIAGNOSIS — M25.361 KNEE INSTABILITY, RIGHT: ICD-10-CM

## 2025-06-30 DIAGNOSIS — S86.911D KNEE STRAIN, RIGHT, SUBSEQUENT ENCOUNTER: Primary | ICD-10-CM

## 2025-06-30 PROCEDURE — 97530 THERAPEUTIC ACTIVITIES: CPT | Performed by: PHYSICAL THERAPIST

## 2025-06-30 PROCEDURE — 97110 THERAPEUTIC EXERCISES: CPT | Performed by: PHYSICAL THERAPIST

## 2025-06-30 NOTE — PROGRESS NOTES
"Daily Note     Today's date: 2025  Patient name: Domenica Grider  : 1965  MRN: [id number]  Referring provider: Walt Liu PA-C  Dx:   Encounter Diagnosis     ICD-10-CM    1. Knee strain, right, subsequent encounter  S86.911D       2. Knee instability, right  M25.361           Start Time: 0806  Stop Time: 0900  Total time in clinic (min): 54 minutes    Subjective: Patient reports that her shoulder was pinchy and painful after her last PT session. Patient had to help move stuff out of her mother's house over the past 4 days and did a lot of lifting, which she was surprises she was able to do. Patient has more knee pain when walking with shoes and less barefooted. Overall, she is sore but believes she is gradually improving.      Objective: See treatment diary below      Assessment: Tolerated treatment well. Patient demonstrated fatigue post treatment and would benefit from continued PT. Patient tolerated progressions to strengthening well. Good technique with lunging noted.      Plan: Continue per plan of care.  Progress treatment as tolerated.         POC expires Unit limit Auth  expiration date PT/OT + Visit Limit?   25 N/A N/A 36                 Visit/Unit Tracking  AUTH Status:  Date         N/A Used 1 2 3 4 5 6 7         Remaining  35 34 33 32 31 30 29          Precautions: standard      Manuals                                                           Neuro Re-Ed             Webslide: M, L row   GTB 3\" 3x10  GTB 2x 10 3\"         Bent over row   3\"x30                                                                           Ther Ex             Patient education: pathophysiology, differential diagnosis, weightbearing exercise for osteoporosis  HEP review   DOMS and HEP         Nustep seat 6 UE/LE  L3 10 min L3x10 L4x10' L4 10 min  with UE at 8  L4 10 min  with UE at 8  L4 10 min      Prone quad str.  3x30\"  3x30\"       " "  Supine SLR  2x10  2x10   YTB 2x10      Side lying hip abduction  2x10  2x10 Standing 2x 10 ea. Hip abd and ext.  1#   Standing 2x10 ea YTB 2x10      LAQ  BTB x15  BTB 2x10   Matrix: 20# 2x10      Standing h/s curl  BTB x15  Seated BTB 2x10   Matrix: 20# 2x10      Supine heel slide    Strap 3\"x20 Strap 3\" 2x 10  HEP       QS    5\"x20         Shoulder extension AAROM with SPC   5\"x20   2x10 5\"        Cane AAROM flex/abd      2x10 ea       TB IR   GTB 2x10  Gtb 2x 10         TB ER   GTB 2x10  GTB 2x 10         ER str. At 90/90 against wall   10\"x10          Sidestepping       RTB 3 laps (length of gym)      Hip abduction clock       RTB 2x5 ea.                   Ther Activity             Squats             Fwd step-ups    6\" 2x10 6\" 3x 10  6\" 3x10        LSD    Unable Lateral step ups 6\" 2x10  LSU  6\"   3x10        Total gym: squats  L22 3x10  L22 3x10  L22 3x10  L22 3x10      Stationary lunge       2x10      Gait Training                                       Modalities                                       Access Code: [email or web address]  URL: [email or web address]  Date: 06/11/2025  Prepared by: Fred Prajapati    Exercises  - Gastroc Stretch on Book  - 1 x daily - 7 x weekly - 3 sets - 1 reps - 30 seconds hold  - Prone Quadriceps Stretch with Strap  - 1 x daily - 7 x weekly - 3 sets - 1 reps - 30 seconds hold  - Squat with Counter Support  - 1 x daily - 4 x weekly - 2-3 sets - 10 reps  - Active Straight Leg Raise with Quad Set  - 1 x daily - 4 x weekly - 2-3 sets - 10 reps  - Sidelying Hip Abduction  - 1 x daily - 4 x weekly - 2-3 sets - 10 reps  - Sitting Knee Extension with Resistance  - 1 x daily - 4 x weekly - 2-3 sets - 10 reps  - Standing Hamstring Curl with Resistance  - 1 x daily - 4 x weekly - 2-3 sets - 10 reps           "

## 2025-07-01 ENCOUNTER — OFFICE VISIT (OUTPATIENT)
Dept: PHYSICAL THERAPY | Facility: CLINIC | Age: 60
End: 2025-07-01
Attending: PHYSICIAN ASSISTANT
Payer: COMMERCIAL

## 2025-07-01 DIAGNOSIS — S46.012D STRAIN OF LEFT ROTATOR CUFF CAPSULE, SUBSEQUENT ENCOUNTER: ICD-10-CM

## 2025-07-01 DIAGNOSIS — M75.32 CALCIFIC TENDONITIS OF LEFT SHOULDER: Primary | ICD-10-CM

## 2025-07-01 PROCEDURE — 97112 NEUROMUSCULAR REEDUCATION: CPT | Performed by: PHYSICAL THERAPIST

## 2025-07-01 PROCEDURE — 97110 THERAPEUTIC EXERCISES: CPT | Performed by: PHYSICAL THERAPIST

## 2025-07-01 NOTE — PROGRESS NOTES
"Daily Note     Today's date: 2025  Patient name: Domenica Grider  : 1965  MRN: [id number]  Referring provider: Walt Liu PA-C  Dx:   Encounter Diagnosis     ICD-10-CM    1. Calcific tendonitis of left shoulder  M75.32       2. Strain of left rotator cuff capsule, subsequent encounter  S46.012D           Start Time: 735  Stop Time: 815  Total time in clinic (min): 40 minutes    Subjective: Patient reports that knee is feeling a little better when walking in flats versus shoes with a heel. Patient's shoulder remains painful at times.      Objective: See treatment diary below      Assessment: Tolerated treatment well. Patient demonstrated fatigue post treatment and would benefit from continued PT. Introduced new periscapular strengthening and stabilization exercises with good patient tolerance. Patient challenged with progressions, reporting increased soreness at end of session.      Plan: Continue per plan of care.  Progress treatment as tolerated.         POC expires Unit limit Auth  expiration date PT/OT + Visit Limit?   25 N/A N/A 36                 Visit/Unit Tracking  AUTH Status:  Date        N/A Used 1 2 3 4 5 6 7 8        Remaining  35 34 33 32 31 30 29 28         Precautions: standard      Manuals                                                          Neuro Re-Ed             Webslide: M, L row   GTB 3\" 3x10  GTB 2x 10 3\"         Bent over row   3\"x30          Prone shoulder ext.        2x10     Prone h abd.        2x10     Prone scaption        2x10     Bilateral ER with scapular retraction        GTB 2x10 5\"     Multidirectional MB stability on wall (V, H, CW/CCW circles)        GMB x20 ea.                  Ther Ex             Patient education: pathophysiology, differential diagnosis, weightbearing exercise for osteoporosis  HEP review   DOMS and HEP         Nustep seat 6 UE/LE  L3 10 min L3x10 L4x10' L4 10 " "min  with UE at 8  L4 10 min  with UE at 8  L4 10 min L5 10 min     Prone quad str.  3x30\"  3x30\"         Supine SLR  2x10  2x10   YTB 2x10      Side lying hip abduction  2x10  2x10 Standing 2x 10 ea. Hip abd and ext.  1#   Standing 2x10 ea YTB 2x10      LAQ  BTB x15  BTB 2x10   Matrix: 20# 2x10      Standing h/s curl  BTB x15  Seated BTB 2x10   Matrix: 20# 2x10      Supine heel slide    Strap 3\"x20 Strap 3\" 2x 10  HEP       QS    5\"x20         Shoulder extension AAROM with SPC   5\"x20   2x10 5\"        Cane AAROM flex/abd      2x10 ea       TB IR   GTB 2x10  Gtb 2x 10         TB ER   GTB 2x10  GTB 2x 10         ER str. At 90/90 against wall   10\"x10          Sidestepping       RTB 3 laps (length of gym)      Hip abduction clock       RTB 2x5 ea.      Scaption wall slides        GTB 2x10     Front, lateral DB raise        2# 2x10 ea.     Ther Activity             Squats             Fwd step-ups    6\" 2x10 6\" 3x 10  6\" 3x10        LSD    Unable Lateral step ups 6\" 2x10  LSU  6\"   3x10        Total gym: squats  L22 3x10  L22 3x10  L22 3x10  L22 3x10      Stationary lunge       2x10      Gait Training                                       Modalities                                       Access Code: [email or web address]  URL: [email or web address]  Date: 06/11/2025  Prepared by: Fred Prajapati    Exercises  - Gastroc Stretch on Book  - 1 x daily - 7 x weekly - 3 sets - 1 reps - 30 seconds hold  - Prone Quadriceps Stretch with Strap  - 1 x daily - 7 x weekly - 3 sets - 1 reps - 30 seconds hold  - Squat with Counter Support  - 1 x daily - 4 x weekly - 2-3 sets - 10 reps  - Active Straight Leg Raise with Quad Set  - 1 x daily - 4 x weekly - 2-3 sets - 10 reps  - Sidelying Hip Abduction  - 1 x daily - 4 x weekly - 2-3 sets - 10 reps  - Sitting Knee Extension with Resistance  - 1 x daily - 4 x weekly - 2-3 sets - 10 reps  - Standing Hamstring Curl with Resistance  - 1 x daily - 4 x weekly - 2-3 sets - 10 reps             "

## 2025-07-07 ENCOUNTER — OFFICE VISIT (OUTPATIENT)
Dept: PHYSICAL THERAPY | Facility: CLINIC | Age: 60
End: 2025-07-07
Attending: PHYSICIAN ASSISTANT
Payer: COMMERCIAL

## 2025-07-07 DIAGNOSIS — M25.361 KNEE INSTABILITY, RIGHT: ICD-10-CM

## 2025-07-07 DIAGNOSIS — S86.911D KNEE STRAIN, RIGHT, SUBSEQUENT ENCOUNTER: Primary | ICD-10-CM

## 2025-07-07 PROCEDURE — 97110 THERAPEUTIC EXERCISES: CPT | Performed by: PHYSICAL THERAPIST

## 2025-07-07 PROCEDURE — 97530 THERAPEUTIC ACTIVITIES: CPT | Performed by: PHYSICAL THERAPIST

## 2025-07-07 NOTE — PROGRESS NOTES
"Daily Note     Today's date: 2025  Patient name: Domenica Grider  : 1965  MRN: [id number]  Referring provider: Walt Liu PA-C  Dx:   Encounter Diagnosis     ICD-10-CM    1. Knee strain, right, subsequent encounter  S86.911D       2. Knee instability, right  M25.361           Start Time: 0800  Stop Time: 0853  Total time in clinic (min): 53 minutes    Subjective: Patient reports that her knee and shoulder have been feeling better and she was able to lay down vinyl chester. Patient states that for the first time she slept through the night and her pain did dain wake her up. Patient is interested in joining the gym and discontinuing formal PT.      Objective: See treatment diary below      Assessment: Tolerated treatment well. Patient demonstrated fatigue post treatment and would benefit from continued PT. Reviewed and updated HEP to promote independence with program as she progresses to gym membership. Good technique for most part with intermittent cueing needed.      Plan: Continue per plan of care.  Progress treatment as tolerated.         POC expires Unit limit Auth  expiration date PT/OT + Visit Limit?   25 N/A N/A 36                 Visit/Unit Tracking  AUTH Status:  Date       N/A Used 1 2 3 4 5 6 7 8 9       Remaining  35 34 33 32 31 30 29 28 27        Precautions: standard      Manuals                                                         Neuro Re-Ed             Webslide: M, L row   GTB 3\" 3x10  GTB 2x 10 3\"         Bent over row   3\"x30          Prone shoulder ext.        2x10     Prone h abd.        2x10     Prone scaption        2x10     Bilateral ER with scapular retraction        GTB 2x10 5\"     Multidirectional MB stability on wall (V, H, CW/CCW circles)        GMB x20 ea.                  Ther Ex             Patient education: pathophysiology, differential diagnosis, weightbearing exercise for " "osteoporosis  HEP review   DOMS and HEP     HEP review ; activity modification ; gym membership    Nustep seat 6 UE/LE  L3 10 min L3x10 L4x10' L4 10 min  with UE at 8  L4 10 min  with UE at 8  L4 10 min L5 10 min L6 10 in    Prone quad str.  3x30\"  3x30\"         Supine SLR  2x10  2x10   YTB 2x10      Side lying hip abduction  2x10  2x10 Standing 2x 10 ea. Hip abd and ext.  1#   Standing 2x10 ea YTB 2x10      LAQ  BTB x15  BTB 2x10   Matrix: 20# 2x10  Matrix: 20# 3x10    Standing h/s curl  BTB x15  Seated BTB 2x10   Matrix: 20# 2x10  Matrix: 20# 3x10    Supine heel slide    Strap 3\"x20 Strap 3\" 2x 10  HEP       QS    5\"x20         Shoulder extension AAROM with SPC   5\"x20   2x10 5\"        Cane AAROM flex/abd      2x10 ea       TB IR   GTB 2x10  Gtb 2x 10         TB ER   GTB 2x10  GTB 2x 10         ER str. At 90/90 against wall   10\"x10          Sidestepping       RTB 3 laps (length of gym)  RTB 2 laps (length of gym)    Hip abduction clock       RTB 2x5 ea.  RTB 2x5 ea.    Scaption wall slides        GTB 2x10     Front, lateral DB raise        2# 2x10 ea.     Ther Activity             Squats             Fwd step-ups    6\" 2x10 6\" 3x 10  6\" 3x10        LSD    Unable Lateral step ups 6\" 2x10  LSU  6\"   3x10        Total gym: squats  L22 3x10  L22 3x10  L22 3x10  L22 3x10  L22 3x10    Stationary lunge       2x10  2x10 b/l    Gait Training                                       Modalities                                       Access Code: [email or web address]  URL: [email or web address]  Date: 07/07/2025  Prepared by: Fred Prajapati    Exercises  - Gastroc Stretch on Book  - 1 x daily - 4 x weekly - 3 sets - 1 reps - 30 seconds hold  - Prone Quadriceps Stretch with Strap  - 1 x daily - 4 x weekly - 3 sets - 1 reps - 30 seconds hold  - Squat with Counter Support  - 1 x daily - 4 x weekly - 2-3 sets - 10 reps  - Active Straight Leg Raise with Quad Set  - 1 x daily - 4 x weekly - 2-3 sets - 10 reps  - Sidelying Hip Abduction  " - 1 x daily - 4 x weekly - 2-3 sets - 10 reps  - Sitting Knee Extension with Resistance  - 1 x daily - 4 x weekly - 2-3 sets - 10 reps  - Standing Hamstring Curl with Resistance  - 1 x daily - 4 x weekly - 2-3 sets - 10 reps  - Side Stepping with Resistance at Ankles  - 1 x daily - 4 x weekly - 1 sets - 10 reps  - Standard Lunge  - 1 x daily - 4 x weekly - 2 sets - 10 reps  - Lateral Step Down  - 1 x daily - 4 x weekly - 2 sets - 10 reps  - Standing Bent Over Single Arm Scapular Row with Table Support  - 1 x daily - 4 x weekly - 2-3 sets - 10 reps  - Prone Shoulder Extension - Single Arm  - 1 x daily - 4 x weekly - 2 sets - 10 reps - 3 seconds hold  - Prone Single Arm Shoulder Horizontal Abduction with Scapular Retraction and Palm Down  - 1 x daily - 4 x weekly - 2 sets - 10 reps - 3 seconds hold  - Prone Single (or double) Arm Shoulder Y  - 1 x daily - 4 x weekly - 2 sets - 10 reps - 3 seconds hold  - Shoulder External Rotation and Scapular Retraction with Resistance  - 1 x daily - 4 x weekly - 2 sets - 10 reps - 5 seconds hold  - Standing Wall Ball Circles with Mini Swiss Ball  - 1 x daily - 4 x weekly - 1 sets - 20 reps  - Shoulder extension with resistance - Neutral  - 1 x daily - 4 x weekly - 2-3 sets - 10 reps - 3 seconds hold  - Standing Shoulder Row with Anchored Resistance  - 1 x daily - 4 x weekly - 2-3 sets - 10 reps - 3 seconds hold  - Shoulder Internal Rotation with Resistance  - 1 x daily - 4 x weekly - 2-3 sets - 10 reps  - Shoulder External Rotation with Anchored Resistance  - 1 x daily - 4 x weekly - 2-3 sets - 10 reps  - Single Arm Doorway Pec Stretch at 90 Degrees Abduction  - 1 x daily - 4 x weekly - 3 sets - 1 reps - 30 seconds hold

## 2025-07-08 ENCOUNTER — OFFICE VISIT (OUTPATIENT)
Dept: PHYSICAL THERAPY | Facility: CLINIC | Age: 60
End: 2025-07-08
Attending: PHYSICIAN ASSISTANT
Payer: COMMERCIAL

## 2025-07-08 DIAGNOSIS — M75.32 CALCIFIC TENDONITIS OF LEFT SHOULDER: ICD-10-CM

## 2025-07-08 DIAGNOSIS — S46.012D STRAIN OF LEFT ROTATOR CUFF CAPSULE, SUBSEQUENT ENCOUNTER: Primary | ICD-10-CM

## 2025-07-08 PROCEDURE — 97110 THERAPEUTIC EXERCISES: CPT | Performed by: PHYSICAL THERAPIST

## 2025-07-08 PROCEDURE — 97112 NEUROMUSCULAR REEDUCATION: CPT | Performed by: PHYSICAL THERAPIST

## 2025-07-08 NOTE — PROGRESS NOTES
"Daily Note     Today's date: 2025  Patient name: Domenica Grider  : 1965  MRN: [id number]  Referring provider: Walt Liu PA-C  Dx:   Encounter Diagnosis     ICD-10-CM    1. Strain of left rotator cuff capsule, subsequent encounter  S46.012D       2. Calcific tendonitis of left shoulder  M75.32           Start Time: 0730  Stop Time: 0815  Total time in clinic (min): 45 minutes    Subjective: Patient reports that her knee feels ok from yesterday. Patient tried doing some shoulder exercises yesterday as well.       Objective: See treatment diary below      Assessment: Tolerated treatment well. Patient demonstrated fatigue post treatment and would benefit from continued PT. Patient reported a pinch at end range shoulder abduction in prone. Performed repeated shoulder extension with SPC and revisited prone h abd with less reported pain.      Plan: Continue per plan of care.  Progress treatment as tolerated.         POC expires Unit limit Auth  expiration date PT/OT + Visit Limit?   25 N/A N/A 36                 Visit/Unit Tracking  AUTH Status:  Date      N/A Used 1 2 3 4 5 6 7 8 9 10      Remaining  35 34 33 32 31 30 29 28 27 26       Precautions: standard      Manuals                                                        Neuro Re-Ed             Webslide: M, L row   GTB 3\" 3x10  GTB 2x 10 3\"         Bent over row   3\"x30          Prone shoulder ext.        2x10  2# 2x10 3\"   Prone h abd.        2x10  1x10 3\"   Prone scaption        2x10  2x10 3\"   Bilateral ER with scapular retraction        GTB 2x10 5\"  GTB 2x10 5\"   Multidirectional MB stability on wall (V, H, CW/CCW circles)        GMB x20 ea.  RMB x20   Standing horizontal abduction          RTB 2x10   Ther Ex             Patient education: pathophysiology, differential diagnosis, weightbearing exercise for osteoporosis  HEP review   DOMS and HEP     HEP " "review ; activity modification ; gym membership    Nustep seat 6 UE/LE  L3 10 min L3x10 L4x10' L4 10 min  with UE at 8  L4 10 min  with UE at 8  L4 10 min L5 10 min L6 10 in L6 10 min   Prone quad str.  3x30\"  3x30\"         Supine SLR  2x10  2x10   YTB 2x10      Side lying hip abduction  2x10  2x10 Standing 2x 10 ea. Hip abd and ext.  1#   Standing 2x10 ea YTB 2x10      LAQ  BTB x15  BTB 2x10   Matrix: 20# 2x10  Matrix: 20# 3x10    Standing h/s curl  BTB x15  Seated BTB 2x10   Matrix: 20# 2x10  Matrix: 20# 3x10    Supine heel slide    Strap 3\"x20 Strap 3\" 2x 10  HEP       QS    5\"x20         Shoulder extension AAROM with SPC   5\"x20   2x10 5\"     20x3\"   Cane AAROM flex/abd      2x10 ea       TB IR   GTB 2x10  Gtb 2x 10         TB ER   GTB 2x10  GTB 2x 10         ER str. At 90/90 against wall   10\"x10          Sidestepping       RTB 3 laps (length of gym)  RTB 2 laps (length of gym)    Hip abduction clock       RTB 2x5 ea.  RTB 2x5 ea.    Scaption wall slides        GTB 2x10     Front, lateral DB raise        2# 2x10 ea.     PNF D2 UE flexion          RTB 2x10   Ther Activity             Squats             Fwd step-ups    6\" 2x10 6\" 3x 10  6\" 3x10        LSD    Unable Lateral step ups 6\" 2x10  LSU  6\"   3x10        Total gym: squats  L22 3x10  L22 3x10  L22 3x10  L22 3x10  L22 3x10    Stationary lunge       2x10  2x10 b/l    Gait Training                                       Modalities                                       Access Code: [email or web address]  URL: [email or web address]  Date: 07/08/2025  Prepared by: Fred Prajapati    Exercises  - Gastroc Stretch on Book  - 1 x daily - 4 x weekly - 3 sets - 1 reps - 30 seconds hold  - Prone Quadriceps Stretch with Strap  - 1 x daily - 4 x weekly - 3 sets - 1 reps - 30 seconds hold  - Squat with Counter Support  - 1 x daily - 4 x weekly - 2-3 sets - 10 reps  - Active Straight Leg Raise with Quad Set  - 1 x daily - 4 x weekly - 2-3 sets - 10 reps  - Sidelying Hip " Abduction  - 1 x daily - 4 x weekly - 2-3 sets - 10 reps  - Sitting Knee Extension with Resistance  - 1 x daily - 4 x weekly - 2-3 sets - 10 reps  - Standing Hamstring Curl with Resistance  - 1 x daily - 4 x weekly - 2-3 sets - 10 reps  - Side Stepping with Resistance at Ankles  - 1 x daily - 4 x weekly - 1 sets - 10 reps  - Standard Lunge  - 1 x daily - 4 x weekly - 2 sets - 10 reps  - Lateral Step Down  - 1 x daily - 4 x weekly - 2 sets - 10 reps  - Standing Bent Over Single Arm Scapular Row with Table Support  - 1 x daily - 4 x weekly - 2-3 sets - 10 reps  - Prone Shoulder Extension - Single Arm  - 1 x daily - 4 x weekly - 2 sets - 10 reps - 3 seconds hold  - Prone Single Arm Shoulder Horizontal Abduction with Scapular Retraction and Palm Down  - 1 x daily - 4 x weekly - 2 sets - 10 reps - 3 seconds hold  - Prone Single (or double) Arm Shoulder Y  - 1 x daily - 4 x weekly - 2 sets - 10 reps - 3 seconds hold  - Shoulder External Rotation and Scapular Retraction with Resistance  - 1 x daily - 4 x weekly - 2 sets - 10 reps - 5 seconds hold  - Standing Wall Ball Circles with Mini Swiss Ball  - 1 x daily - 4 x weekly - 1 sets - 20 reps  - Shoulder extension with resistance - Neutral  - 1 x daily - 4 x weekly - 2-3 sets - 10 reps - 3 seconds hold  - Standing Shoulder Row with Anchored Resistance  - 1 x daily - 4 x weekly - 2-3 sets - 10 reps - 3 seconds hold  - Shoulder Internal Rotation with Resistance  - 1 x daily - 4 x weekly - 2-3 sets - 10 reps  - Shoulder External Rotation with Anchored Resistance  - 1 x daily - 4 x weekly - 2-3 sets - 10 reps  - Single Arm Doorway Pec Stretch at 90 Degrees Abduction  - 1 x daily - 4 x weekly - 3 sets - 1 reps - 30 seconds hold  - Standing Shoulder Single Arm PNF D2 Flexion with Anchored Resistance  - 1 x daily - 4 x weekly - 2-3 sets - 10 reps  - Shoulder Horizontal Abduction with Anchored Resistance  - 1 x daily - 4 x weekly - 2-3 sets - 10 reps  - Single Arm Shoulder Flexion  with Dumbbell  - 1 x daily - 4 x weekly - 2-3 sets - 10 reps  - Standing Single Arm Shoulder Abduction with Dumbbell - Thumb Up  - 1 x daily - 4 x weekly - 2-3 sets - 10 reps  - Scaption Wall Slide with Towel  - 1 x daily - 4 x weekly - 2-3 sets - 10 reps

## 2025-07-14 ENCOUNTER — EVALUATION (OUTPATIENT)
Dept: PHYSICAL THERAPY | Facility: CLINIC | Age: 60
End: 2025-07-14
Attending: PHYSICIAN ASSISTANT
Payer: COMMERCIAL

## 2025-07-14 DIAGNOSIS — M25.361 KNEE INSTABILITY, RIGHT: ICD-10-CM

## 2025-07-14 DIAGNOSIS — M75.32 CALCIFIC TENDONITIS OF LEFT SHOULDER: ICD-10-CM

## 2025-07-14 DIAGNOSIS — S46.012D STRAIN OF LEFT ROTATOR CUFF CAPSULE, SUBSEQUENT ENCOUNTER: Primary | ICD-10-CM

## 2025-07-14 DIAGNOSIS — S86.911D KNEE STRAIN, RIGHT, SUBSEQUENT ENCOUNTER: ICD-10-CM

## 2025-07-14 PROCEDURE — 97110 THERAPEUTIC EXERCISES: CPT | Performed by: PHYSICAL THERAPIST

## 2025-07-14 NOTE — PROGRESS NOTES
PT Re-Evaluation     Today's date: 2025  Patient name: Domenica Grdier  : 1965  MRN: [id number]  Referring provider: Walt Liu PA-C  Dx:   Encounter Diagnosis     ICD-10-CM    1. Strain of left rotator cuff capsule, subsequent encounter  S46.012D       2. Calcific tendonitis of left shoulder  M75.32       3. Knee strain, right, subsequent encounter  S86.911D       4. Knee instability, right  M25.361           Start Time: 830  Stop Time: 935  Total time in clinic (min): 65 minutes    Assessment  Impairments: abnormal or restricted ROM, activity intolerance, impaired physical strength, lacks appropriate home exercise program, pain with function, weight-bearing intolerance, poor posture  and poor body mechanics    Assessment details: Since beginning physical therapy, Domenica has attended a total number of 11 visits and has maintained excellent compliance with established POC. Patient has made significant improvements in all areas, including decreased pain, improved quality of gait, increased UE/LE strength, improved shoulder and knee ROM, and improved overall activity tolerance. Patient is reporting improved ability to perform a/iadls. Despite these improvements, Patient continues to present with intermittent knee > shoulder pain. Patient does present with positive meniscal testing and may benefit from future diagnostic imaging her her pain regresses. Patient is independent with comprehensive HEP and anticipates joining gym. Patient will be placed on 30 day hold and was instructed to contact PT if she notices a decline in function or if she has any questions/concerns.   Understanding of Dx/Px/POC: excellent     Prognosis: excellent    Goals  Short Term Goals: to be achieved by 4 weeks ALL GOALS MET  1) Patient to be independent with basic HEP.  2) Decrease pain to 6/10 at it's worst.  3) Increase UE strength by 1/2 MMT grade in all deficient planes.  4) Increase UE ROM by > 5 deg in all deficient  planes.  5) Patient to report decreased sleep interruption secondary to pain.    Long Term Goals: to be achieved by discharge ALL GOALS PROGRESSING  1) FOTO equal to or greater than 71.  2) Patient to be independent with comprehensive HEP.  3) Abolish pain for improved quality of life.  4) Increase UE strength to 5/5 MMT grade in all deficient planes to improve a/iadls.  5) Increase UE ROM to within 5 deg of contralateral UE to improve a/iadls.  6) Patient to report no sleep interruption secondary to pain.    Plan  Patient would benefit from: skilled PT  Planned modality interventions: biofeedback, cryotherapy, hydrotherapy, unattended electrical stimulation, thermotherapy: hydrocollator packs and low level laser therapy    Planned therapy interventions: activity modification, ADL retraining, behavior modification, body mechanics training, functional ROM exercises, home exercise program, IADL retraining, joint mobilization, manual therapy, massage, neuromuscular re-education, patient education, postural training, strengthening, stretching, therapeutic activities and therapeutic exercise    Frequency: 1-2x week  Duration in weeks: 6  Plan of Care beginning date: 7/14/2025  Plan of Care expiration date: 8/25/2025  Treatment plan discussed with: patient  Plan details: Patient will be placed on 30 day hold.        Subjective Evaluation    History of Present Illness  Mechanism of injury: Patient reports that her left shoulder activity tolerance has significantly improved. Patient is able to lift, power wash, vacuuming, and other activities with less difficulty. Patient has not tried to ride her William yet. Patient does have some difficulty lifting heavy weights overhead. Patient estimates her left shoulder overall level of function to be approximately 75% of her premorbid norm.     Patient's right knee pain has also improved significantly to where she can lift heavy boxes and perform steps. Patient continues have left  knee pain when laying on her side and maneuvering in bed. Patient continues to have pain when lifting heavy. Patient's quality of gait has improved and she is walking hills with greater ease. Patient is now able to perform highlights. Patient's knee lu/gives out on her at times and has pain with twisting. Patient also estimates her right knee overall level of function to be approximately 75% of her premorbid norm.        Patient presents with h/o chronic left shoulder pain over the past several years recently flaring up in the past year. Patient went to ortho where she received a steroid injection, which failed to provide relief. On 5/22/25 she developed severe, worsening pain and immobility leading her to go to Urgent Care where she was placed in a sling and prescribed Prednisone, but limited her dosage d/t negative side effects. Patient's strength has improved, but remains limited with lifting d/t pain, weakness, and fear of reinjury. Patient is limited when cleaning and driving. Patient has jolting pain when driving with her arm. Patient has difficulty lifting her grandchild out of her crib. Patient denies experiencing tingling/numbness, crepitus, or unstable. Patient is currently being treated with medication for Lyme's disease, beginning on 6/7/25.        Patient presents with secondary c/o acute onset of right knee pain beginning approximately 1 week ago when she kneeled and twisted. Patient feels that her knee is gradually improving. Patient continues to have pain with weightbearing, twisting, and moving her knee side to side. Patient has new onset of clicking since her injury. Patient has a slight sense of instability. Patient denies experiencing tingling/numbness.   Patient Goals  Patient goals for therapy: decreased pain, increased motion, increased strength, independence with ADLs/IADLs and return to sport/leisure activities    Pain  Pain scale: Left shoulder: 2/10 ; Right knee: 0/10.  Pain scale at  "lowest: Left shoulder: 0/10 ; Right knee: 0/10.  Pain scale at highest: Left shoulder: 3/10 ; Right knee: 5/10.  Location: left shoulder: anterior, UT ; right knee:  Quality: dull ache (\"annoying\", pinch)  Alleviating factors: Lidocaine, steroid injection, Flexeril, elevation, ice.    Social Support    Employment status: working  Hand dominance: right ([employer]alt[employer], [employer], [employer])      Diagnostic Tests  X-ray: abnormal (Left shoulder: \"Rotator cuff calcific tendinosis.\")        Objective     Postural Observations  Seated posture: fair  Standing posture: fair      Active Range of Motion   Left Shoulder   Normal active range of motion    Right Shoulder   Normal active range of motion  Left Knee   Normal active range of motion    Right Knee   Normal active range of motion  Flexion: with pain    Passive Range of Motion   Left Shoulder   Normal passive range of motion    Right Shoulder   Normal passive range of motion  Left Knee   Normal passive range of motion    Right Knee   Normal passive range of motion  Flexion: with pain    Mobility   Patellar Mobility:   Left Knee   WFL: medial, lateral, superior and inferior.     Right Knee   WFL: medial, lateral, superior and inferior    Strength/Myotome Testing     Left Shoulder     Planes of Motion   Flexion: 4+   Abduction: 4+   External rotation at 0°: 5   Internal rotation at 0°: 5     Right Shoulder   Normal muscle strength    Left Elbow   Normal strength    Right Elbow   Normal strength    Left Wrist/Hand   Normal wrist strength    Right Wrist/Hand   Normal wrist strength    Left Hip   Planes of Motion   Flexion: 5  Extension: 5  Abduction: 5    Right Hip   Planes of Motion   Flexion: 5  Extension: 5  Abduction: 5    Left Knee   Normal strength    Right Knee   Normal strength    Left Ankle/Foot   Dorsiflexion: 5    Right Ankle/Foot   Dorsiflexion: 5    Tests     Left Knee   Negative anterior Lachman, patella-femoral grind, " "valgus stress test at 0 degrees, valgus stress test at 30 degrees, varus stress test at 0 degrees and varus stress test at 30 degrees.     Right Knee   Positive medial Zahra.   Negative anterior Lachman, lateral Zahra, patella-femoral grind, valgus stress test at 0 degrees, valgus stress test at 30 degrees, varus stress test at 0 degrees and varus stress test at 30 degrees.     Ambulation   Weight-Bearing Status   Weight-Bearing Status (Left): full weight bearing   Weight-Bearing Status (Right): full weight-bearing    Assistive device used: none    Ambulation: Level Surfaces   Ambulation without assistive device: independent    Observational Gait   Gait: within functional limits     Functional Assessment      Squat    Left within functional limits and right within functional limits.       Flowsheet Rows      Flowsheet Row Most Recent Value   PT/OT G-Codes    Current Score 64   Projected Score 71                 POC expires Unit limit Auth  expiration date PT/OT + Visit Limit?   8/25/25 N/A N/A 36                 Visit/Unit Tracking  AUTH Status:  Date 6/9 6/11 6/16 6/17 6/23 6/24 6/30 7/1 7/7 7/8 7/14    N/A Used 1 2 3 4 5 6 7 8 9 10 11     Remaining  35 34 33 32 31 30 29 28 27 26 25      Precautions: standard      Manuals 7/14 6/16 6/17 6/23 6/24 6/30 7/1 7/7 7/8   Reassessment GR                                                   Neuro Re-Ed             Webslide: M, L row   GTB 3\" 3x10  GTB 2x 10 3\"         Bent over row   3\"x30          Prone shoulder ext.        2x10  2# 2x10 3\"   Prone h abd.        2x10  1x10 3\"   Prone scaption        2x10  2x10 3\"   Bilateral ER with scapular retraction        GTB 2x10 5\"  GTB 2x10 5\"   Multidirectional MB stability on wall (V, H, CW/CCW circles)        GMB x20 ea.  RMB x20   Standing horizontal abduction          RTB 2x10   Ther Ex             Patient education: pathophysiology, differential diagnosis, HEP review GR    DOMS and HEP     HEP review ; activity " "modification ; gym membership    Nustep seat 6 UE/LE L6 10 min  L3x10 L4x10' L4 10 min  with UE at 8  L4 10 min  with UE at 8  L4 10 min L5 10 min L6 10 in L6 10 min   Prone quad str.    3x30\"         Supine SLR    2x10   YTB 2x10      Side lying hip abduction    2x10 Standing 2x 10 ea. Hip abd and ext.  1#   Standing 2x10 ea YTB 2x10      LAQ    BTB 2x10   Matrix: 20# 2x10  Matrix: 20# 3x10    Standing h/s curl    Seated BTB 2x10   Matrix: 20# 2x10  Matrix: 20# 3x10    Supine heel slide    Strap 3\"x20 Strap 3\" 2x 10  HEP       QS    5\"x20         Shoulder extension AAROM with SPC   5\"x20   2x10 5\"     20x3\"   Cane AAROM flex/abd      2x10 ea       TB IR   GTB 2x10  Gtb 2x 10         TB ER   GTB 2x10  GTB 2x 10         ER str. At 90/90 against wall   10\"x10          Sidestepping       RTB 3 laps (length of gym)  RTB 2 laps (length of gym)    Hip abduction clock       RTB 2x5 ea.  RTB 2x5 ea.    Scaption wall slides        GTB 2x10     Front, lateral DB raise        2# 2x10 ea.     PNF D2 UE flexion          RTB 2x10   Ther Activity             Squats             Fwd step-ups    6\" 2x10 6\" 3x 10  6\" 3x10        LSD    Unable Lateral step ups 6\" 2x10  LSU  6\"   3x10        Total gym: squats    L22 3x10  L22 3x10  L22 3x10  L22 3x10    Stationary lunge       2x10  2x10 b/l    Gait Training                                       Modalities                                       Access Code: [email or web address]  URL: [email or web address]  Date: 07/08/2025  Prepared by: Fred Prajapati    Exercises  - Gastroc Stretch on Book  - 1 x daily - 4 x weekly - 3 sets - 1 reps - 30 seconds hold  - Prone Quadriceps Stretch with Strap  - 1 x daily - 4 x weekly - 3 sets - 1 reps - 30 seconds hold  - Squat with Counter Support  - 1 x daily - 4 x weekly - 2-3 sets - 10 reps  - Active Straight Leg Raise with Quad Set  - 1 x daily - 4 x weekly - 2-3 sets - 10 reps  - Sidelying Hip Abduction  - 1 x daily - 4 x weekly - 2-3 sets - 10 reps  - " Sitting Knee Extension with Resistance  - 1 x daily - 4 x weekly - 2-3 sets - 10 reps  - Standing Hamstring Curl with Resistance  - 1 x daily - 4 x weekly - 2-3 sets - 10 reps  - Side Stepping with Resistance at Ankles  - 1 x daily - 4 x weekly - 1 sets - 10 reps  - Standard Lunge  - 1 x daily - 4 x weekly - 2 sets - 10 reps  - Lateral Step Down  - 1 x daily - 4 x weekly - 2 sets - 10 reps  - Standing Bent Over Single Arm Scapular Row with Table Support  - 1 x daily - 4 x weekly - 2-3 sets - 10 reps  - Prone Shoulder Extension - Single Arm  - 1 x daily - 4 x weekly - 2 sets - 10 reps - 3 seconds hold  - Prone Single Arm Shoulder Horizontal Abduction with Scapular Retraction and Palm Down  - 1 x daily - 4 x weekly - 2 sets - 10 reps - 3 seconds hold  - Prone Single (or double) Arm Shoulder Y  - 1 x daily - 4 x weekly - 2 sets - 10 reps - 3 seconds hold  - Shoulder External Rotation and Scapular Retraction with Resistance  - 1 x daily - 4 x weekly - 2 sets - 10 reps - 5 seconds hold  - Standing Wall Ball Circles with Mini Swiss Ball  - 1 x daily - 4 x weekly - 1 sets - 20 reps  - Shoulder extension with resistance - Neutral  - 1 x daily - 4 x weekly - 2-3 sets - 10 reps - 3 seconds hold  - Standing Shoulder Row with Anchored Resistance  - 1 x daily - 4 x weekly - 2-3 sets - 10 reps - 3 seconds hold  - Shoulder Internal Rotation with Resistance  - 1 x daily - 4 x weekly - 2-3 sets - 10 reps  - Shoulder External Rotation with Anchored Resistance  - 1 x daily - 4 x weekly - 2-3 sets - 10 reps  - Single Arm Doorway Pec Stretch at 90 Degrees Abduction  - 1 x daily - 4 x weekly - 3 sets - 1 reps - 30 seconds hold  - Standing Shoulder Single Arm PNF D2 Flexion with Anchored Resistance  - 1 x daily - 4 x weekly - 2-3 sets - 10 reps  - Shoulder Horizontal Abduction with Anchored Resistance  - 1 x daily - 4 x weekly - 2-3 sets - 10 reps  - Single Arm Shoulder Flexion with Dumbbell  - 1 x daily - 4 x weekly - 2-3 sets - 10  reps  - Standing Single Arm Shoulder Abduction with Dumbbell - Thumb Up  - 1 x daily - 4 x weekly - 2-3 sets - 10 reps  - Scaption Wall Slide with Towel  - 1 x daily - 4 x weekly - 2-3 sets - 10 reps

## 2025-07-31 ENCOUNTER — TELEPHONE (OUTPATIENT)
Age: 60
End: 2025-07-31

## (undated) DEVICE — 3M™ TEGADERM™ TRANSPARENT FILM DRESSING FRAME STYLE, 1624W, 2-3/8 IN X 2-3/4 IN (6 CM X 7 CM), 100/CT 4CT/CASE: Brand: 3M™ TEGADERM™

## (undated) DEVICE — DRAPE C-ARM X-RAY

## (undated) DEVICE — REM POLYHESIVE ADULT PATIENT RETURN ELECTRODE: Brand: VALLEYLAB

## (undated) DEVICE — SPHINCTEROTOME: Brand: DREAMTOME™ RX 44

## (undated) DEVICE — INJECTION SYTEM RAPID REFILL RX

## (undated) DEVICE — SUT VICRYL 4-0 PS-2 27 IN J426H

## (undated) DEVICE — ENDOPOUCH RETRIEVER SPECIMEN RETRIEVAL BAGS: Brand: ENDOPOUCH RETRIEVER

## (undated) DEVICE — [HIGH FLOW INSUFFLATOR,  DO NOT USE IF PACKAGE IS DAMAGED,  KEEP DRY,  KEEP AWAY FROM SUNLIGHT,  PROTECT FROM HEAT AND RADIOACTIVE SOURCES.]: Brand: PNEUMOSURE

## (undated) DEVICE — LIGAMAX 5 MM ENDOSCOPIC MULTIPLE CLIP APPLIER: Brand: LIGAMAX

## (undated) DEVICE — GAUZE SPONGES,USP TYPE VII GAUZE, 12 PLY: Brand: CURITY

## (undated) DEVICE — RETRIEVAL BALLOON CATHETER: Brand: EXTRACTOR™ PRO RX

## (undated) DEVICE — INFLATION DEVICE: Brand: ENCORE 26

## (undated) DEVICE — IRRIG ENDO FLO TUBING

## (undated) DEVICE — ENDOPATH XCEL BLADELESS TROCARS WITH STABILITY SLEEVES: Brand: ENDOPATH XCEL

## (undated) DEVICE — GLOVE INDICATOR PI UNDERGLOVE SZ 7.5 BLUE

## (undated) DEVICE — CHOLE CATH KIT ARROW

## (undated) DEVICE — 3M™ STERI-STRIP™ REINFORCED ADHESIVE SKIN CLOSURES, R1546, 1/4 IN X 4 IN (6 MM X 100 MM), 10 STRIPS/ENVELOPE: Brand: 3M™ STERI-STRIP™

## (undated) DEVICE — ALL PURPOSE SPONGES,NON-WOVEN, 3 PLY: Brand: CURITY

## (undated) DEVICE — ENDOPATH XCEL UNIVERSAL TROCAR STABLILITY SLEEVES: Brand: ENDOPATH XCEL

## (undated) DEVICE — CHLORAPREP HI-LITE 26ML ORANGE

## (undated) DEVICE — SYRINGE 10ML LL

## (undated) DEVICE — LIGHT HANDLE COVER CAMERA DISP

## (undated) DEVICE — IV CATH 14 G X 1.75

## (undated) DEVICE — LOCKING DEVICE AND BIOPSY CAP FOR USE WITH RX BILIARY SYSTEM: Brand: RX LOCKING DEVICE AND BIOPSY CAP

## (undated) DEVICE — AEM CORD

## (undated) DEVICE — GLOVE SRG BIOGEL ECLIPSE 7.5

## (undated) DEVICE — INTENDED FOR TISSUE SEPARATION, AND OTHER PROCEDURES THAT REQUIRE A SHARP SURGICAL BLADE TO PUNCTURE OR CUT.: Brand: BARD-PARKER SAFETY BLADES SIZE 15, STERILE

## (undated) DEVICE — TUBING SUCTION 5MM X 12 FT

## (undated) DEVICE — SCD SEQUENTIAL COMPRESSION COMFORT SLEEVE MEDIUM KNEE LENGTH: Brand: KENDALL SCD

## (undated) DEVICE — BALLOON DILATATION CATHETER: Brand: HURRICANE™ RX

## (undated) DEVICE — ALLENTOWN LAP CHOLE APP PACK: Brand: CARDINAL HEALTH